# Patient Record
Sex: FEMALE | Race: WHITE | Employment: OTHER | URBAN - METROPOLITAN AREA
[De-identification: names, ages, dates, MRNs, and addresses within clinical notes are randomized per-mention and may not be internally consistent; named-entity substitution may affect disease eponyms.]

---

## 2017-04-03 ENCOUNTER — OFFICE VISIT (OUTPATIENT)
Dept: FAMILY MEDICINE CLINIC | Age: 47
End: 2017-04-03
Payer: MEDICARE

## 2017-04-03 VITALS
SYSTOLIC BLOOD PRESSURE: 128 MMHG | DIASTOLIC BLOOD PRESSURE: 89 MMHG | WEIGHT: 197.2 LBS | HEART RATE: 83 BPM | BODY MASS INDEX: 34.94 KG/M2 | HEIGHT: 63 IN

## 2017-04-03 DIAGNOSIS — E78.2 MIXED HYPERLIPIDEMIA: ICD-10-CM

## 2017-04-03 DIAGNOSIS — F32.A DEPRESSION, UNSPECIFIED DEPRESSION TYPE: ICD-10-CM

## 2017-04-03 DIAGNOSIS — D50.9 IRON DEFICIENCY ANEMIA, UNSPECIFIED IRON DEFICIENCY ANEMIA TYPE: ICD-10-CM

## 2017-04-03 PROCEDURE — 1036F TOBACCO NON-USER: CPT | Performed by: NURSE PRACTITIONER

## 2017-04-03 PROCEDURE — G8427 DOCREV CUR MEDS BY ELIG CLIN: HCPCS | Performed by: NURSE PRACTITIONER

## 2017-04-03 PROCEDURE — G8417 CALC BMI ABV UP PARAM F/U: HCPCS | Performed by: NURSE PRACTITIONER

## 2017-04-03 PROCEDURE — 99214 OFFICE O/P EST MOD 30 MIN: CPT | Performed by: NURSE PRACTITIONER

## 2017-04-03 RX ORDER — DULOXETINE 40 MG/1
40 CAPSULE, DELAYED RELEASE ORAL DAILY
Qty: 30 CAPSULE | Refills: 3 | Status: SHIPPED | OUTPATIENT
Start: 2017-04-03 | End: 2017-09-04 | Stop reason: SDUPTHER

## 2017-04-03 ASSESSMENT — ENCOUNTER SYMPTOMS
COUGH: 0
SHORTNESS OF BREATH: 0
NAUSEA: 0
ABDOMINAL PAIN: 0

## 2017-06-20 ENCOUNTER — HOSPITAL ENCOUNTER (OUTPATIENT)
Age: 47
Setting detail: SPECIMEN
Discharge: HOME | End: 2017-06-20
Payer: MEDICARE

## 2017-06-20 LAB — IGE: 79 IU/ML

## 2017-09-04 DIAGNOSIS — F32.A DEPRESSION, UNSPECIFIED DEPRESSION TYPE: ICD-10-CM

## 2017-09-05 RX ORDER — DULOXETINE 40 MG/1
CAPSULE, DELAYED RELEASE ORAL
Qty: 30 CAPSULE | Refills: 3 | Status: SHIPPED | OUTPATIENT
Start: 2017-09-05 | End: 2018-02-20 | Stop reason: SDUPTHER

## 2017-12-25 ENCOUNTER — HOSPITAL ENCOUNTER (EMERGENCY)
Age: 47
Discharge: HOME | End: 2017-12-25
Attending: EMERGENCY MEDICINE
Payer: MEDICARE

## 2017-12-25 VITALS
HEIGHT: 63 IN | RESPIRATION RATE: 16 BRPM | BODY MASS INDEX: 28.35 KG/M2 | SYSTOLIC BLOOD PRESSURE: 131 MMHG | OXYGEN SATURATION: 96 % | WEIGHT: 160 LBS | TEMPERATURE: 97.9 F | HEART RATE: 81 BPM | DIASTOLIC BLOOD PRESSURE: 79 MMHG

## 2017-12-25 DIAGNOSIS — R11.2 NON-INTRACTABLE VOMITING WITH NAUSEA, UNSPECIFIED VOMITING TYPE: Primary | ICD-10-CM

## 2017-12-25 DIAGNOSIS — B34.9 VIRAL SYNDROME: ICD-10-CM

## 2017-12-25 DIAGNOSIS — R19.7 DIARRHEA, UNSPECIFIED TYPE: ICD-10-CM

## 2017-12-25 LAB
-: ABNORMAL
ABSOLUTE BANDS #: 0.94 K/UL (ref 0–1)
ABSOLUTE EOS #: 0 K/UL (ref 0–0.4)
ABSOLUTE IMMATURE GRANULOCYTE: ABNORMAL K/UL (ref 0–0.3)
ABSOLUTE LYMPH #: 1.4 K/UL (ref 1–4.8)
ABSOLUTE MONO #: 0 K/UL (ref 0.1–1.3)
ALBUMIN SERPL-MCNC: 4.2 G/DL (ref 3.5–5.2)
ALBUMIN/GLOBULIN RATIO: ABNORMAL (ref 1–2.5)
ALP BLD-CCNC: 99 U/L (ref 35–104)
ALT SERPL-CCNC: 17 U/L (ref 5–33)
AMORPHOUS: ABNORMAL
ANION GAP SERPL CALCULATED.3IONS-SCNC: 14 MMOL/L (ref 9–17)
AST SERPL-CCNC: 18 U/L
BACTERIA: ABNORMAL
BANDS: 6 % (ref 0–10)
BASOPHILS # BLD: 1 % (ref 0–2)
BASOPHILS ABSOLUTE: 0.16 K/UL (ref 0–0.2)
BILIRUB SERPL-MCNC: 0.35 MG/DL (ref 0.3–1.2)
BILIRUBIN URINE: NEGATIVE
BUN BLDV-MCNC: 9 MG/DL (ref 6–20)
BUN/CREAT BLD: ABNORMAL (ref 9–20)
CALCIUM SERPL-MCNC: 9.5 MG/DL (ref 8.6–10.4)
CASTS UA: ABNORMAL /LPF
CHLORIDE BLD-SCNC: 101 MMOL/L (ref 98–107)
CO2: 24 MMOL/L (ref 20–31)
COLOR: YELLOW
COMMENT UA: ABNORMAL
CREAT SERPL-MCNC: 0.52 MG/DL (ref 0.5–0.9)
CRYSTALS, UA: ABNORMAL /HPF
DIFFERENTIAL TYPE: ABNORMAL
EOSINOPHILS RELATIVE PERCENT: 0 % (ref 0–4)
EPITHELIAL CELLS UA: ABNORMAL /HPF
GFR AFRICAN AMERICAN: >60 ML/MIN
GFR NON-AFRICAN AMERICAN: >60 ML/MIN
GFR SERPL CREATININE-BSD FRML MDRD: ABNORMAL ML/MIN/{1.73_M2}
GFR SERPL CREATININE-BSD FRML MDRD: ABNORMAL ML/MIN/{1.73_M2}
GLUCOSE BLD-MCNC: 114 MG/DL (ref 70–99)
GLUCOSE URINE: NEGATIVE
HCT VFR BLD CALC: 41.8 % (ref 36–46)
HEMOGLOBIN: 14.2 G/DL (ref 12–16)
IMMATURE GRANULOCYTES: ABNORMAL %
KETONES, URINE: ABNORMAL
LEUKOCYTE ESTERASE, URINE: ABNORMAL
LIPASE: 27 U/L (ref 13–60)
LYMPHOCYTES # BLD: 9 % (ref 24–44)
MAGNESIUM: 2 MG/DL (ref 1.6–2.6)
MCH RBC QN AUTO: 29.3 PG (ref 26–34)
MCHC RBC AUTO-ENTMCNC: 34.1 G/DL (ref 31–37)
MCV RBC AUTO: 85.8 FL (ref 80–100)
MONOCYTES # BLD: 0 % (ref 1–7)
MORPHOLOGY: NORMAL
MUCUS: ABNORMAL
MYELOCYTES ABSOLUTE COUNT: 0.16 K/UL
MYELOCYTES: 1 %
NITRITE, URINE: NEGATIVE
OTHER OBSERVATIONS UA: ABNORMAL
PDW BLD-RTO: 13.8 % (ref 11.5–14.9)
PH UA: 7.5 (ref 5–8)
PLATELET # BLD: 251 K/UL (ref 150–450)
PLATELET ESTIMATE: ABNORMAL
PMV BLD AUTO: 7.9 FL (ref 6–12)
POTASSIUM SERPL-SCNC: 4.2 MMOL/L (ref 3.7–5.3)
PROTEIN UA: NEGATIVE
RBC # BLD: 4.87 M/UL (ref 4–5.2)
RBC # BLD: ABNORMAL 10*6/UL
RBC UA: ABNORMAL /HPF
RENAL EPITHELIAL, UA: ABNORMAL /HPF
SEG NEUTROPHILS: 83 % (ref 36–66)
SEGMENTED NEUTROPHILS ABSOLUTE COUNT: 12.94 K/UL (ref 1.3–9.1)
SODIUM BLD-SCNC: 139 MMOL/L (ref 135–144)
SPECIFIC GRAVITY UA: 1.02 (ref 1–1.03)
TOTAL PROTEIN: 7.4 G/DL (ref 6.4–8.3)
TRICHOMONAS: ABNORMAL
TURBIDITY: CLEAR
URINE HGB: NEGATIVE
UROBILINOGEN, URINE: NORMAL
WBC # BLD: 15.6 K/UL (ref 3.5–11)
WBC # BLD: ABNORMAL 10*3/UL
WBC UA: ABNORMAL /HPF
YEAST: ABNORMAL

## 2017-12-25 PROCEDURE — 83735 ASSAY OF MAGNESIUM: CPT

## 2017-12-25 PROCEDURE — 96374 THER/PROPH/DIAG INJ IV PUSH: CPT

## 2017-12-25 PROCEDURE — 81001 URINALYSIS AUTO W/SCOPE: CPT

## 2017-12-25 PROCEDURE — 80053 COMPREHEN METABOLIC PANEL: CPT

## 2017-12-25 PROCEDURE — 2580000003 HC RX 258: Performed by: EMERGENCY MEDICINE

## 2017-12-25 PROCEDURE — 96361 HYDRATE IV INFUSION ADD-ON: CPT

## 2017-12-25 PROCEDURE — 83690 ASSAY OF LIPASE: CPT

## 2017-12-25 PROCEDURE — 36415 COLL VENOUS BLD VENIPUNCTURE: CPT

## 2017-12-25 PROCEDURE — 99284 EMERGENCY DEPT VISIT MOD MDM: CPT

## 2017-12-25 PROCEDURE — 96375 TX/PRO/DX INJ NEW DRUG ADDON: CPT

## 2017-12-25 PROCEDURE — 6360000002 HC RX W HCPCS: Performed by: EMERGENCY MEDICINE

## 2017-12-25 PROCEDURE — 87086 URINE CULTURE/COLONY COUNT: CPT

## 2017-12-25 PROCEDURE — 85025 COMPLETE CBC W/AUTO DIFF WBC: CPT

## 2017-12-25 RX ORDER — DIPHENHYDRAMINE HYDROCHLORIDE 50 MG/ML
25 INJECTION INTRAMUSCULAR; INTRAVENOUS ONCE
Status: COMPLETED | OUTPATIENT
Start: 2017-12-25 | End: 2017-12-25

## 2017-12-25 RX ORDER — METOCLOPRAMIDE HYDROCHLORIDE 5 MG/ML
10 INJECTION INTRAMUSCULAR; INTRAVENOUS ONCE
Status: COMPLETED | OUTPATIENT
Start: 2017-12-25 | End: 2017-12-25

## 2017-12-25 RX ORDER — KETOROLAC TROMETHAMINE 30 MG/ML
30 INJECTION, SOLUTION INTRAMUSCULAR; INTRAVENOUS ONCE
Status: COMPLETED | OUTPATIENT
Start: 2017-12-25 | End: 2017-12-25

## 2017-12-25 RX ORDER — ONDANSETRON 4 MG/1
4 TABLET, FILM COATED ORAL EVERY 8 HOURS PRN
Qty: 10 TABLET | Refills: 0 | Status: SHIPPED | OUTPATIENT
Start: 2017-12-25 | End: 2017-12-28

## 2017-12-25 RX ORDER — 0.9 % SODIUM CHLORIDE 0.9 %
1000 INTRAVENOUS SOLUTION INTRAVENOUS ONCE
Status: COMPLETED | OUTPATIENT
Start: 2017-12-25 | End: 2017-12-25

## 2017-12-25 RX ORDER — FENTANYL CITRATE 50 UG/ML
100 INJECTION, SOLUTION INTRAMUSCULAR; INTRAVENOUS ONCE
Status: COMPLETED | OUTPATIENT
Start: 2017-12-25 | End: 2017-12-25

## 2017-12-25 RX ADMIN — DIPHENHYDRAMINE HYDROCHLORIDE 25 MG: 50 INJECTION, SOLUTION INTRAMUSCULAR; INTRAVENOUS at 14:20

## 2017-12-25 RX ADMIN — METOCLOPRAMIDE 10 MG: 5 INJECTION, SOLUTION INTRAMUSCULAR; INTRAVENOUS at 14:19

## 2017-12-25 RX ADMIN — SODIUM CHLORIDE 1000 ML: 9 INJECTION, SOLUTION INTRAVENOUS at 14:19

## 2017-12-25 RX ADMIN — KETOROLAC TROMETHAMINE 30 MG: 30 INJECTION, SOLUTION INTRAMUSCULAR at 16:30

## 2017-12-25 RX ADMIN — FENTANYL CITRATE 100 MCG: 50 INJECTION INTRAMUSCULAR; INTRAVENOUS at 15:03

## 2017-12-25 ASSESSMENT — PAIN SCALES - GENERAL
PAINLEVEL_OUTOF10: 3
PAINLEVEL_OUTOF10: 6
PAINLEVEL_OUTOF10: 10
PAINLEVEL_OUTOF10: 5

## 2017-12-25 ASSESSMENT — PAIN DESCRIPTION - DESCRIPTORS
DESCRIPTORS: THROBBING
DESCRIPTORS_2: HEADACHE
DESCRIPTORS: ACHING

## 2017-12-25 ASSESSMENT — PAIN DESCRIPTION - ORIENTATION
ORIENTATION: MID
ORIENTATION: LEFT;RIGHT;UPPER

## 2017-12-25 ASSESSMENT — PAIN DESCRIPTION - LOCATION
LOCATION: ABDOMEN
LOCATION_2: HEAD
LOCATION: HEAD

## 2017-12-25 ASSESSMENT — PAIN DESCRIPTION - INTENSITY: RATING_2: 10

## 2017-12-25 ASSESSMENT — PAIN DESCRIPTION - PAIN TYPE
TYPE: ACUTE PAIN
TYPE: ACUTE PAIN

## 2017-12-25 NOTE — ED PROVIDER NOTES
wound.      Negative in 10 essential Systems except as mentioned above and in the HPI. PAST MEDICAL HISTORY    has a past medical history of Arthritis; Charcot-Shanel-Tooth disease; Depression; History of ; History of miscarriage; History of tubal ligation; Kidney anomaly, congenital; Kidney stones; Muscular dystrophy ([address] Renal disease; and Seasonal allergies. SURGICAL HISTORY      has a past surgical history that includes Inguinal hernia repair; Kidney removal; Cholecystectomy, laparoscopic (16); Tubal ligation (1994); Knee arthroscopy (Left); Knee arthroscopy (Right, 2016); Breast biopsy (Bilateral); Dilation and curettage of uterus; and Endometrial ablation (10/19/2016). CURRENT MEDICATIONS       Previous Medications    DULOXETINE HCL 40 MG CPEP    take 1 capsule by mouth once daily    FLUTICASONE (FLONASE) 50 MCG/ACT NASAL SPRAY    instill 1 spray into each nostril once daily    HYDROCODONE-ACETAMINOPHEN (NORCO) 7.5-325 MG PER TABLET        IBUPROFEN (ADVIL;MOTRIN) 800 MG TABLET    Take 1 tablet by mouth every 6 hours as needed for Pain    LORATADINE (CLARITIN) 10 MG CAPS    Take 10 mg by mouth daily        ALLERGIES     is allergic to penicillins. FAMILY HISTORY     indicated that her mother is alive. She indicated that her father is . She indicated that both of her brothers are alive. She indicated that her maternal grandmother is . She indicated that her maternal grandfather is . She indicated that her paternal grandmother is . She indicated that her paternal grandfather is . family history includes Alcohol Abuse in her father and maternal grandfather; Alzheimer's Disease in her paternal grandmother; COPD in her father; Cirrhosis in her father and maternal grandfather; Diabetes in her brother; High Blood Pressure in her mother; Hypertension in her father;  Other in her mother and paternal grandfather; Seizures in her brother. SOCIAL HISTORY      reports that she has never smoked. She has never used smokeless tobacco. She reports that she does not drink alcohol or use drugs. PHYSICAL EXAM     INITIAL VITALS:  height is 5' 3\" (1.6 m) and weight is 160 lb (72.6 kg). Her oral temperature is 97.9 °F (36.6 °C). Her blood pressure is 131/79 and her pulse is 81. Her respiration is 16 and oxygen saturation is 96%. Physical Exam   Constitutional: She is oriented to person, place, and time and well-developed, well-nourished, and in no distress. No distress. HENT:   Head: Normocephalic and atraumatic. Mouth/Throat: Mucous membranes are dry. Eyes: Conjunctivae are normal. Pupils are equal, round, and reactive to light. Neck: Neck supple. Cardiovascular: Normal rate, regular rhythm, normal heart sounds and intact distal pulses. No murmur heard. Pulmonary/Chest: Effort normal and breath sounds normal. No respiratory distress. Abdominal: Soft. Bowel sounds are normal. She exhibits no distension. There is no tenderness. There is no rebound and no guarding. Musculoskeletal: She exhibits no edema or tenderness. Lymphadenopathy:     She has no cervical adenopathy. Neurological: She is alert and oriented to person, place, and time. GCS score is 15. Skin: Skin is warm and dry. No rash noted. She is not diaphoretic. Psychiatric: Affect and judgment normal.   Nursing note and vitals reviewed. DIFFERENTIAL DIAGNOSIS/MDM:   52-year-old female presents with nausea, vomiting and diarrhea since this morning. She is afebrile and nontoxic in appearance. Vital signs are within normal limits. She does appear mildly dehydrated with dry mucous murmurs. No abdominal tenderness on exam.  No peritoneal signs. We'll get abdominal lab work, give IV fluids, antiemetics and analgesics and reassess.   Suspect gastroenteritis, cholelithiasis versus cholecystitis, pancreatitis, food poisoning, metabolic abnormality, to keep her self hydrated by drinking plenty of fluids. We'll prescribe Zofran for the next 48-72 hours. Patient was strongly advised to return if symptoms worsen. Patient agreeable with plan will be discharged home today. CRITICAL CARE:      CONSULTS:  None      PROCEDURES:      FINAL IMPRESSION      1. Non-intractable vomiting with nausea, unspecified vomiting type    2. Viral syndrome    3.  Diarrhea, unspecified type            DISPOSITION/PLAN   DISPOSITION Decision To Discharge 12/25/2017 05:24:25 PM          PATIENT REFERRED TO:  Jesica Lucas, [address] [place] [address] 75  [address] [facility] [address]  [phone]    Schedule an appointment as soon as possible for a visit in 2 days        DISCHARGE MEDICATIONS:  New Prescriptions    ONDANSETRON (ZOFRAN) 4 MG TABLET    Take 1 tablet by mouth every 8 hours as needed for Nausea       (Please note that portions of this note were completed with a voice recognition program.  Efforts were made to edit the dictations but occasionally words are mis-transcribed.)    Anika Diaz DO  Attending Emergency Physician          Anika Diaz DO  12/25/17 1731

## 2017-12-25 NOTE — ED NOTES
Walked to [facility] with steady gait, voided clear yellow with spec to lab     Sophia Garvin, RN  12/25/17 0055

## 2017-12-26 LAB
CULTURE: NORMAL
CULTURE: NORMAL
Lab: NORMAL
SPECIMEN DESCRIPTION: NORMAL
SPECIMEN DESCRIPTION: NORMAL
STATUS: NORMAL

## 2018-01-12 ENCOUNTER — OFFICE VISIT (OUTPATIENT)
Dept: FAMILY MEDICINE CLINIC | Age: 48
End: 2018-01-12
Payer: MEDICARE

## 2018-01-12 ENCOUNTER — HOSPITAL ENCOUNTER (OUTPATIENT)
Age: 48
Discharge: HOME | End: 2018-01-12
Payer: MEDICARE

## 2018-01-12 VITALS
OXYGEN SATURATION: 95 % | DIASTOLIC BLOOD PRESSURE: 90 MMHG | SYSTOLIC BLOOD PRESSURE: 110 MMHG | RESPIRATION RATE: 20 BRPM | HEART RATE: 80 BPM | TEMPERATURE: 98 F

## 2018-01-12 DIAGNOSIS — R63.5 WEIGHT GAIN: ICD-10-CM

## 2018-01-12 DIAGNOSIS — E78.2 MIXED HYPERLIPIDEMIA: ICD-10-CM

## 2018-01-12 DIAGNOSIS — R03.0 ELEVATED BP WITHOUT DIAGNOSIS OF HYPERTENSION: Primary | ICD-10-CM

## 2018-01-12 DIAGNOSIS — R51.9 NONINTRACTABLE HEADACHE, UNSPECIFIED CHRONICITY PATTERN, UNSPECIFIED HEADACHE TYPE: ICD-10-CM

## 2018-01-12 DIAGNOSIS — F33.0 MILD EPISODE OF RECURRENT MAJOR DEPRESSIVE DISORDER (HCC): ICD-10-CM

## 2018-01-12 LAB
CHOLESTEROL/HDL RATIO: 3.5
CHOLESTEROL: 182 MG/DL
DIRECT EXAM: NORMAL
HDLC SERPL-MCNC: 52 MG/DL
LDL CHOLESTEROL: 102 MG/DL (ref 0–130)
Lab: NORMAL
SPECIMEN DESCRIPTION: NORMAL
SPECIMEN DESCRIPTION: NORMAL
STATUS: NORMAL
TRIGL SERPL-MCNC: 138 MG/DL
TSH SERPL DL<=0.05 MIU/L-ACNC: 0.67 MIU/L (ref 0.3–5)
VLDLC SERPL CALC-MCNC: NORMAL MG/DL (ref 1–30)

## 2018-01-12 PROCEDURE — 99214 OFFICE O/P EST MOD 30 MIN: CPT | Performed by: NURSE PRACTITIONER

## 2018-01-12 PROCEDURE — G8419 CALC BMI OUT NRM PARAM NOF/U: HCPCS | Performed by: NURSE PRACTITIONER

## 2018-01-12 PROCEDURE — G8484 FLU IMMUNIZE NO ADMIN: HCPCS | Performed by: NURSE PRACTITIONER

## 2018-01-12 PROCEDURE — 36415 COLL VENOUS BLD VENIPUNCTURE: CPT

## 2018-01-12 PROCEDURE — 1036F TOBACCO NON-USER: CPT | Performed by: NURSE PRACTITIONER

## 2018-01-12 PROCEDURE — G8427 DOCREV CUR MEDS BY ELIG CLIN: HCPCS | Performed by: NURSE PRACTITIONER

## 2018-01-12 PROCEDURE — 87804 INFLUENZA ASSAY W/OPTIC: CPT

## 2018-01-12 PROCEDURE — 84443 ASSAY THYROID STIM HORMONE: CPT

## 2018-01-12 PROCEDURE — 80061 LIPID PANEL: CPT

## 2018-01-12 RX ORDER — MONTELUKAST SODIUM 10 MG/1
10 TABLET ORAL DAILY
COMMUNITY
End: 2018-03-15 | Stop reason: ALTCHOICE

## 2018-01-12 ASSESSMENT — ENCOUNTER SYMPTOMS
VOMITING: 0
ABDOMINAL PAIN: 0
NAUSEA: 0
SHORTNESS OF BREATH: 0
WHEEZING: 0

## 2018-01-12 NOTE — PROGRESS NOTES
Visit Information    Have you changed or started any medications since your last visit including any over-the-counter medicines, vitamins, or herbal medicines? no   Have you stopped taking any of your medications? Is so, why? -  no  Are you having any side effects from any of your medications? - no    Have you seen any other physician or provider since your last visit? yes -  Er 12/25/2017   Have you had any other diagnostic tests since your last visit? yes -  Blood  And urine   Have you been seen in the emergency room and/or had an admission in a hospital since we last saw you?  yes -  12/25/2017   Have you had your routine dental cleaning in the past 6 months?  no     Do you have an active MyChart account? If no, what is the barrier?   Yes    Patient Care Team:  Genevieve Burdick CNP as PCP - General (Certified Nurse Practitioner)  Genevieve Burdick CNP as PCP - S Attributed Provider  Teresita Rosas MD as Surgeon (Orthopedic Surgery)  Fernando Negrete DO as Consulting Physician (Obstetrics & Gynecology)  Gi Abrams as Consulting Physician (Allergy)    Medical History Review  Past Medical, Family, and Social History reviewed and does not contribute to the patient presenting condition    Health Maintenance   Topic Date Due    DTaP/Tdap/Td vaccine (1 - Tdap) 05/12/1990    Cervical cancer screen  06/21/2017    Flu vaccine (1) 09/01/2017    Diabetes screen  07/08/2019    Lipid screen  12/16/2021    HIV screen  Completed
counseling on the following healthy behaviors: nutrition, exercise and weight reduction  Reviewed prior labs and health maintenance. Continue current medications, diet and exercise. Discussed use, benefit, and side effects of prescribed medications. Barriers to medication compliance addressed. Patient given educational materials - see patient instructions. All patient questions answered. Patient voiced understanding.

## 2018-01-16 ENCOUNTER — OFFICE VISIT (OUTPATIENT)
Dept: BEHAVIORAL/MENTAL HEALTH CLINIC | Age: 48
End: 2018-01-16
Payer: MEDICARE

## 2018-01-16 DIAGNOSIS — F33.1 MAJOR DEPRESSIVE DISORDER, RECURRENT EPISODE, MODERATE DEGREE (HCC): Primary | ICD-10-CM

## 2018-01-16 PROCEDURE — 90837 PSYTX W PT 60 MINUTES: CPT | Performed by: SOCIAL WORKER

## 2018-01-16 ASSESSMENT — PATIENT HEALTH QUESTIONNAIRE - PHQ9
10. IF YOU CHECKED OFF ANY PROBLEMS, HOW DIFFICULT HAVE THESE PROBLEMS MADE IT FOR YOU TO DO YOUR WORK, TAKE CARE OF THINGS AT HOME, OR GET ALONG WITH OTHER PEOPLE: 2
5. POOR APPETITE OR OVEREATING: 2
SUM OF ALL RESPONSES TO PHQ QUESTIONS 1-9: 18
SUM OF ALL RESPONSES TO PHQ9 QUESTIONS 1 & 2: 6
1. LITTLE INTEREST OR PLEASURE IN DOING THINGS: 3
7. TROUBLE CONCENTRATING ON THINGS, SUCH AS READING THE NEWSPAPER OR WATCHING TELEVISION: 3
9. THOUGHTS THAT YOU WOULD BE BETTER OFF DEAD, OR OF HURTING YOURSELF: 0
2. FEELING DOWN, DEPRESSED OR HOPELESS: 3
3. TROUBLE FALLING OR STAYING ASLEEP: 2
8. MOVING OR SPEAKING SO SLOWLY THAT OTHER PEOPLE COULD HAVE NOTICED. OR THE OPPOSITE, BEING SO FIGETY OR RESTLESS THAT YOU HAVE BEEN MOVING AROUND A LOT MORE THAN USUAL: 0
4. FEELING TIRED OR HAVING LITTLE ENERGY: 2
6. FEELING BAD ABOUT YOURSELF - OR THAT YOU ARE A FAILURE OR HAVE LET YOURSELF OR YOUR FAMILY DOWN: 3
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
## 2018-02-06 ENCOUNTER — INITIAL CONSULT (OUTPATIENT)
Dept: BEHAVIORAL/MENTAL HEALTH CLINIC | Age: 48
End: 2018-02-06
Payer: MEDICARE

## 2018-02-06 DIAGNOSIS — F33.1 MAJOR DEPRESSIVE DISORDER, RECURRENT EPISODE, MODERATE DEGREE (HCC): Primary | ICD-10-CM

## 2018-02-06 PROCEDURE — 90832 PSYTX W PT 30 MINUTES: CPT | Performed by: SOCIAL WORKER
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
## 2018-02-20 DIAGNOSIS — F32.A DEPRESSION, UNSPECIFIED DEPRESSION TYPE: ICD-10-CM

## 2018-02-20 RX ORDER — DULOXETINE 40 MG/1
CAPSULE, DELAYED RELEASE ORAL
Qty: 30 CAPSULE | Refills: 3 | Status: SHIPPED | OUTPATIENT
Start: 2018-02-20 | End: 2018-03-06

## 2018-03-06 ENCOUNTER — OFFICE VISIT (OUTPATIENT)
Dept: FAMILY MEDICINE CLINIC | Age: 48
End: 2018-03-06
Payer: MEDICARE

## 2018-03-06 ENCOUNTER — TELEPHONE (OUTPATIENT)
Dept: FAMILY MEDICINE CLINIC | Age: 48
End: 2018-03-06

## 2018-03-06 VITALS
OXYGEN SATURATION: 98 % | BODY MASS INDEX: 35.3 KG/M2 | SYSTOLIC BLOOD PRESSURE: 133 MMHG | HEIGHT: 63 IN | WEIGHT: 199.2 LBS | HEART RATE: 101 BPM | DIASTOLIC BLOOD PRESSURE: 88 MMHG | TEMPERATURE: 98.2 F

## 2018-03-06 DIAGNOSIS — D72.829 LEUKOCYTOSIS, UNSPECIFIED TYPE: ICD-10-CM

## 2018-03-06 DIAGNOSIS — F41.1 GAD (GENERALIZED ANXIETY DISORDER): ICD-10-CM

## 2018-03-06 DIAGNOSIS — M21.371 BILATERAL FOOT-DROP: ICD-10-CM

## 2018-03-06 DIAGNOSIS — R20.2 NUMBNESS AND TINGLING: ICD-10-CM

## 2018-03-06 DIAGNOSIS — R73.9 HYPERGLYCEMIA: ICD-10-CM

## 2018-03-06 DIAGNOSIS — R20.0 NUMBNESS AND TINGLING: ICD-10-CM

## 2018-03-06 DIAGNOSIS — F33.1 MODERATE EPISODE OF RECURRENT MAJOR DEPRESSIVE DISORDER (HCC): ICD-10-CM

## 2018-03-06 DIAGNOSIS — R29.6 FALLS FREQUENTLY: ICD-10-CM

## 2018-03-06 DIAGNOSIS — M21.372 BILATERAL FOOT-DROP: ICD-10-CM

## 2018-03-06 DIAGNOSIS — M51.24 THORACIC DISC HERNIATION: ICD-10-CM

## 2018-03-06 DIAGNOSIS — M48.04 SPINAL STENOSIS, THORACIC: ICD-10-CM

## 2018-03-06 DIAGNOSIS — M89.9 BONE DISORDER: ICD-10-CM

## 2018-03-06 DIAGNOSIS — G89.29 CHRONIC BACK PAIN GREATER THAN 3 MONTHS DURATION: ICD-10-CM

## 2018-03-06 DIAGNOSIS — G60.0 CHARCOT-MARIE-TOOTH DISEASE: Primary | Chronic | ICD-10-CM

## 2018-03-06 DIAGNOSIS — M54.9 CHRONIC BACK PAIN GREATER THAN 3 MONTHS DURATION: ICD-10-CM

## 2018-03-06 DIAGNOSIS — Z23 NEED FOR DIPHTHERIA-TETANUS-PERTUSSIS (TDAP) VACCINE: ICD-10-CM

## 2018-03-06 DIAGNOSIS — R00.0 TACHYCARDIA: ICD-10-CM

## 2018-03-06 PROBLEM — G35 MULTIPLE SCLEROSIS (HCC): Status: RESOLVED | Noted: 2018-03-06 | Resolved: 2018-03-06

## 2018-03-06 PROBLEM — E66.9 OBESITY: Status: ACTIVE | Noted: 2018-03-06

## 2018-03-06 PROBLEM — R26.9 GAIT ABNORMALITY: Status: ACTIVE | Noted: 2017-06-21

## 2018-03-06 PROBLEM — G35 MULTIPLE SCLEROSIS (HCC): Status: ACTIVE | Noted: 2018-03-06

## 2018-03-06 PROCEDURE — G8484 FLU IMMUNIZE NO ADMIN: HCPCS | Performed by: FAMILY MEDICINE

## 2018-03-06 PROCEDURE — 1036F TOBACCO NON-USER: CPT | Performed by: FAMILY MEDICINE

## 2018-03-06 PROCEDURE — G8417 CALC BMI ABV UP PARAM F/U: HCPCS | Performed by: FAMILY MEDICINE

## 2018-03-06 PROCEDURE — G8427 DOCREV CUR MEDS BY ELIG CLIN: HCPCS | Performed by: FAMILY MEDICINE

## 2018-03-06 PROCEDURE — 99215 OFFICE O/P EST HI 40 MIN: CPT | Performed by: FAMILY MEDICINE

## 2018-03-06 PROCEDURE — G0444 DEPRESSION SCREEN ANNUAL: HCPCS | Performed by: FAMILY MEDICINE

## 2018-03-06 RX ORDER — FLUOXETINE HYDROCHLORIDE 20 MG/1
20 CAPSULE ORAL DAILY
Qty: 30 CAPSULE | Refills: 3 | Status: SHIPPED | OUTPATIENT
Start: 2018-03-06 | End: 2018-03-09 | Stop reason: SDUPTHER

## 2018-03-06 RX ORDER — GABAPENTIN 100 MG/1
100 CAPSULE ORAL EVERY EVENING
Qty: 30 CAPSULE | Refills: 0 | Status: SHIPPED | OUTPATIENT
Start: 2018-03-06 | End: 2019-03-19

## 2018-03-06 RX ORDER — IBUPROFEN 200 MG
800 TABLET ORAL
COMMUNITY
Start: 2016-10-19 | End: 2020-01-02 | Stop reason: ALTCHOICE

## 2018-03-06 RX ORDER — B-COMPLEX WITH VITAMIN C
1 TABLET ORAL DAILY
Qty: 90 TABLET | Refills: 3 | Status: SHIPPED | OUTPATIENT
Start: 2018-03-06 | End: 2019-03-19

## 2018-03-06 ASSESSMENT — PATIENT HEALTH QUESTIONNAIRE - PHQ9
2. FEELING DOWN, DEPRESSED OR HOPELESS: 1
3. TROUBLE FALLING OR STAYING ASLEEP: 1
1. LITTLE INTEREST OR PLEASURE IN DOING THINGS: 2
4. FEELING TIRED OR HAVING LITTLE ENERGY: 3
7. TROUBLE CONCENTRATING ON THINGS, SUCH AS READING THE NEWSPAPER OR WATCHING TELEVISION: 1
10. IF YOU CHECKED OFF ANY PROBLEMS, HOW DIFFICULT HAVE THESE PROBLEMS MADE IT FOR YOU TO DO YOUR WORK, TAKE CARE OF THINGS AT HOME, OR GET ALONG WITH OTHER PEOPLE: 1
SUM OF ALL RESPONSES TO PHQ QUESTIONS 1-9: 11
SUM OF ALL RESPONSES TO PHQ9 QUESTIONS 1 & 2: 3
9. THOUGHTS THAT YOU WOULD BE BETTER OFF DEAD, OR OF HURTING YOURSELF: 0
8. MOVING OR SPEAKING SO SLOWLY THAT OTHER PEOPLE COULD HAVE NOTICED. OR THE OPPOSITE, BEING SO FIGETY OR RESTLESS THAT YOU HAVE BEEN MOVING AROUND A LOT MORE THAN USUAL: 0
6. FEELING BAD ABOUT YOURSELF - OR THAT YOU ARE A FAILURE OR HAVE LET YOURSELF OR YOUR FAMILY DOWN: 1
5. POOR APPETITE OR OVEREATING: 2

## 2018-03-06 ASSESSMENT — ENCOUNTER SYMPTOMS
ABDOMINAL DISTENTION: 0
ABDOMINAL PAIN: 0
NAUSEA: 0
CHEST TIGHTNESS: 0
WHEEZING: 0
BACK PAIN: 1
VOMITING: 0
COUGH: 0
CONSTIPATION: 0
SHORTNESS OF BREATH: 0
DIARRHEA: 0

## 2018-03-06 ASSESSMENT — ANXIETY QUESTIONNAIRES
4. TROUBLE RELAXING: 2-OVER HALF THE DAYS
GAD7 TOTAL SCORE: 16
2. NOT BEING ABLE TO STOP OR CONTROL WORRYING: 3-NEARLY EVERY DAY
6. BECOMING EASILY ANNOYED OR IRRITABLE: 3-NEARLY EVERY DAY
3. WORRYING TOO MUCH ABOUT DIFFERENT THINGS: 3-NEARLY EVERY DAY
1. FEELING NERVOUS, ANXIOUS, OR ON EDGE: 3-NEARLY EVERY DAY
5. BEING SO RESTLESS THAT IT IS HARD TO SIT STILL: 2-OVER HALF THE DAYS
7. FEELING AFRAID AS IF SOMETHING AWFUL MIGHT HAPPEN: 0-NOT AT ALL SURE

## 2018-03-06 NOTE — PROGRESS NOTES
01/12/2018 0.67  0.30 - 5.00 mIU/L Final    Comment: Performed at [facility]: MADYSON GLORIA [address]   [phone]      Specimen Description 01/12/2018 . NASOPHARYNGEAL SWAB Performed at [facility]: MADYSON [address]   Final    Specimen Description 01/12/2018  [address] [phone]   Final    Special Requests 01/12/2018 NOT REPORTED   Final    Direct Exam 01/12/2018 PRESUMPTIVE NEGATIVE for Influenza A + B antigens. Final    Direct Exam 01/12/2018 PCR testing to confirm this result is available upon request.  Specimen will be   Final    Direct Exam 01/12/2018  saved in the laboratory for 7 days. Please call [phone] if PCR testing is   Final    Direct Exam 01/12/2018  indicated.    Final    Status 01/12/2018 FINAL 01/12/2018   Final           Lab Results   Component Value Date    WBC 15.6 (H) 12/25/2017    HGB 14.2 12/25/2017    HCT 41.8 12/25/2017    MCV 85.8 12/25/2017     12/25/2017       Lab Results   Component Value Date     12/25/2017    K 4.2 12/25/2017     12/25/2017    CO2 24 12/25/2017    BUN 9 12/25/2017    CREATININE 0.52 12/25/2017    GLUCOSE 114 12/25/2017    CALCIUM 9.5 12/25/2017          Lab Results   Component Value Date    LABA1C 4.6 07/08/2016     Lab Results   Component Value Date    EAG 85 07/08/2016       Lab Results   Component Value Date    ALT 17 12/25/2017    AST 18 12/25/2017    ALKPHOS 99 12/25/2017    BILITOT 0.35 12/25/2017       Lab Results   Component Value Date    TSH 0.67 01/12/2018       Lab Results   Component Value Date    CHOL 182 01/12/2018    CHOL 202 (H) 12/16/2016     Lab Results   Component Value Date    TRIG 138 01/12/2018    TRIG 162 (H) 12/16/2016     Lab Results   Component Value Date    HDL 52 01/12/2018    HDL 60 12/16/2016     Lab Results   Component Value Date    LDLCHOLESTEROL 102 01/12/2018    LDLCHOLESTEROL 110 12/16/2016       Lab Results   Component Value Date tenderness. She exhibits no edema. Lumbar back: She exhibits decreased range of motion, tenderness, bony tenderness (lumbar and SIJ b/l), pain and spasm. Wearing leg braces. Hesitation when starting to first walk. There is no thoracic pain   Neurological: She is alert and oriented to person, place, and time. She displays abnormal reflex. No cranial nerve deficit. She exhibits abnormal muscle tone. Skin: Skin is warm and dry. No rash noted. She is not diaphoretic. Psychiatric: Her behavior is normal. Judgment and thought content normal. Her mood appears anxious. Her affect is labile. She exhibits a depressed mood. Crying     Nursing note and vitals reviewed. ASSESSMENT AND PLAN      1. Charcot-Shanel-Tooth disease  worsening  - Obdulia Chan MD, Physical Medicine and [address]*  - start gabapentin (NEURONTIN) 100 MG capsule; Take 1 capsule by mouth every evening for 30 days. Dispense: 30 capsule; Refill: 0  -start B Complex Vitamins (VITAMIN B COMPLEX) TABS; Take 1 tablet by mouth daily  Dispense: 90 tablet; Refill: 3  - DME Order for Cane as OP      Maye Conte requires a  Quad cane due to impaired ambulation and for increased stability in order to participate in or complete daily living tasks of: ambulating. The patient is able to safely use the cane and the functional mobility deficit can be sufficiently resolved. 2. Moderate episode of recurrent major depressive disorder (HCC)  improvedr   -Start FLUoxetine (PROZAC) 20 MG capsule; Take 1 capsule by mouth daily  Dispense: 30 capsule; Refill: 3  Stop Cymbalta  Start cognitive behavior therapy    3. Chronic back pain greater than 3 months duration  - [facility]  -start  gabapentin (NEURONTIN) 100 MG capsule; Take 1 capsule by mouth every evening for 30 days. Dispense: 30 capsule; Refill: 0  - Vitamin D 25 Hydroxy;  Future  - DME Order for [facility] as OP    If needed, we can order x-ray lumbar Orders Placed This Encounter   Procedures    CBC Auto Differential     Standing Status:   Future     Standing Expiration Date:   3/6/2019    Hemoglobin A1C     Standing Status:   Future     Standing Expiration Date:   3/6/2019    Vitamin B12 & Folate     Standing Status:   Future     Standing Expiration Date:   3/7/2019    Vitamin D 25 Hydroxy     Standing Status:   Future     Standing Expiration Date:   3/6/2019   Priscilla Zavaleta MD, Physical Medicine and [facility]*     Referral Priority:   Routine     Referral Type:   Consult for Advice and Opinion     Referral Reason:   Specialty Services Required     Referred to Provider:   Yaakov Mcneil MD     Requested Specialty:   Physical Medicine and Rehab     Number of Visits Requested:   [place] [address]     Referral Priority:   Routine     Referral Type:   Consult for Advice and Opinion     Referral Reason:   Specialty Services Required     Referred to Provider:   Hermelinda Barber MD     Number of Visits Requested:   1    EKG 12 Lead     Standing Status:   Future     Standing Expiration Date:   3/6/2019     Order Specific Question:   Reason for Exam?     Answer: Tachycardia    DME Order for Aileen Benders as OP     You must complete the order parameters below and add the medical necessity documentation for this DME in a separate note.      [facility]    Diagnosis:   Frequent falls  Bilateral foot-drop  Charcot-Shanel-Tooth disease  Falls frequently  Thoracic disc herniation  Spinal stenosis, thoracic  Need for diphtheria-tetanus-pertussis (Tdap) vaccine  Chronic back pain greater than 3 months duration    Duration: Purchase         Medications Discontinued During This Encounter   Medication Reason    DULoxetine HCl 40 MG CPEP Cost of medication       Sushma received counseling on the following healthy behaviors: nutrition, exercise, medication adherence and weight loss  Reviewed prior labs and health

## 2018-03-07 ENCOUNTER — TELEPHONE (OUTPATIENT)
Dept: FAMILY MEDICINE CLINIC | Age: 48
End: 2018-03-07

## 2018-03-09 ENCOUNTER — TELEPHONE (OUTPATIENT)
Dept: FAMILY MEDICINE CLINIC | Age: 48
End: 2018-03-09

## 2018-03-09 DIAGNOSIS — F33.1 MODERATE EPISODE OF RECURRENT MAJOR DEPRESSIVE DISORDER (HCC): ICD-10-CM

## 2018-03-09 DIAGNOSIS — F41.1 GAD (GENERALIZED ANXIETY DISORDER): ICD-10-CM

## 2018-03-09 RX ORDER — FLUOXETINE HYDROCHLORIDE 20 MG/1
40 CAPSULE ORAL DAILY
Qty: 30 CAPSULE | Refills: 0
Start: 2018-03-09 | End: 2018-03-15 | Stop reason: DRUGHIGH

## 2018-03-09 NOTE — TELEPHONE ENCOUNTER
Can take prozac 2x 20 mg, call us back in 1 week if it's working and wants it refilled or switch back to Cymbalta.   Also patient needs appointment with Monisha Rosario    Future Appointments  Date Time Provider Dom Trejo   3/15/2018 10:00 AM MD MARIELLE HernandezCZ PAINM[facility] None   4/17/2018 9:15 AM [facility]         Orders Placed This Encounter   Medications    FLUoxetine (PROZAC) 20 MG capsule     Sig: Take 2 capsules by mouth daily Dose increased 3/9/2018     Dispense:  30 capsule     Refill:  0

## 2018-03-09 NOTE — TELEPHONE ENCOUNTER
Future Appointments  Date Time Provider Dom Trejo   4/17/2018 9:15 AM Rosamaria Jimenez MD fp sc [facility]

## 2018-03-14 ENCOUNTER — TELEPHONE (OUTPATIENT)
Dept: FAMILY MEDICINE CLINIC | Age: 48
End: 2018-03-14

## 2018-03-14 ENCOUNTER — OFFICE VISIT (OUTPATIENT)
Dept: BEHAVIORAL/MENTAL HEALTH CLINIC | Age: 48
End: 2018-03-14
Payer: MEDICARE

## 2018-03-14 DIAGNOSIS — F33.1 MODERATE EPISODE OF RECURRENT MAJOR DEPRESSIVE DISORDER (HCC): Primary | ICD-10-CM

## 2018-03-14 DIAGNOSIS — F33.1 MAJOR DEPRESSIVE DISORDER, RECURRENT EPISODE, MODERATE DEGREE (HCC): Primary | ICD-10-CM

## 2018-03-14 PROCEDURE — 90832 PSYTX W PT 30 MINUTES: CPT | Performed by: SOCIAL WORKER

## 2018-03-14 NOTE — PATIENT INSTRUCTIONS
- Start working on limits and boundaries  - Give daughter a schedule blocking off when you are not available

## 2018-03-15 ENCOUNTER — HOSPITAL ENCOUNTER (OUTPATIENT)
Dept: PAIN MANAGEMENT | Age: 48
Discharge: HOME | End: 2018-03-15
Payer: MEDICARE

## 2018-03-15 VITALS
HEIGHT: 63 IN | WEIGHT: 199 LBS | OXYGEN SATURATION: 98 % | DIASTOLIC BLOOD PRESSURE: 98 MMHG | RESPIRATION RATE: 20 BRPM | SYSTOLIC BLOOD PRESSURE: 147 MMHG | TEMPERATURE: 98.6 F | HEART RATE: 97 BPM | BODY MASS INDEX: 35.26 KG/M2

## 2018-03-15 DIAGNOSIS — M48.04 SPINAL STENOSIS, THORACIC: ICD-10-CM

## 2018-03-15 DIAGNOSIS — G60.0 CHARCOT MARIE TOOTH MUSCULAR ATROPHY: Primary | ICD-10-CM

## 2018-03-15 DIAGNOSIS — G89.29 CHRONIC BACK PAIN GREATER THAN 3 MONTHS DURATION: ICD-10-CM

## 2018-03-15 DIAGNOSIS — M21.372 BILATERAL FOOT-DROP: ICD-10-CM

## 2018-03-15 DIAGNOSIS — M51.36 DDD (DEGENERATIVE DISC DISEASE), LUMBAR: ICD-10-CM

## 2018-03-15 DIAGNOSIS — M21.371 BILATERAL FOOT-DROP: ICD-10-CM

## 2018-03-15 DIAGNOSIS — M51.24 THORACIC DISC HERNIATION: ICD-10-CM

## 2018-03-15 DIAGNOSIS — R20.2 NUMBNESS AND TINGLING: ICD-10-CM

## 2018-03-15 DIAGNOSIS — R20.0 NUMBNESS AND TINGLING: ICD-10-CM

## 2018-03-15 DIAGNOSIS — F41.1 GAD (GENERALIZED ANXIETY DISORDER): ICD-10-CM

## 2018-03-15 DIAGNOSIS — M54.9 CHRONIC BACK PAIN GREATER THAN 3 MONTHS DURATION: ICD-10-CM

## 2018-03-15 PROCEDURE — 80307 DRUG TEST PRSMV CHEM ANLYZR: CPT

## 2018-03-15 PROCEDURE — 99204 OFFICE O/P NEW MOD 45 MIN: CPT | Performed by: PAIN MEDICINE

## 2018-03-15 PROCEDURE — 99203 OFFICE O/P NEW LOW 30 MIN: CPT

## 2018-03-15 RX ORDER — FLUOXETINE HYDROCHLORIDE 20 MG/1
60 CAPSULE ORAL DAILY
Qty: 90 CAPSULE | Refills: 0 | Status: SHIPPED | OUTPATIENT
Start: 2018-03-15 | End: 2019-03-19

## 2018-03-15 ASSESSMENT — PAIN DESCRIPTION - ORIENTATION: ORIENTATION: RIGHT;LEFT

## 2018-03-15 ASSESSMENT — ENCOUNTER SYMPTOMS
BLURRED VISION: 0
ORTHOPNEA: 0
NAUSEA: 0
EYES NEGATIVE: 1
VOMITING: 0
PHOTOPHOBIA: 0
BACK PAIN: 1
COUGH: 0
GASTROINTESTINAL NEGATIVE: 1
HEARTBURN: 0
SHORTNESS OF BREATH: 0
CONSTIPATION: 0

## 2018-03-15 ASSESSMENT — PAIN DESCRIPTION - LOCATION: LOCATION: BACK;LEG;KNEE

## 2018-03-15 ASSESSMENT — PAIN DESCRIPTION - FREQUENCY: FREQUENCY: CONTINUOUS

## 2018-03-15 ASSESSMENT — PAIN DESCRIPTION - DIRECTION: RADIATING_TOWARDS: BILATERAL LEGS

## 2018-03-15 ASSESSMENT — PAIN DESCRIPTION - ONSET: ONSET: ON-GOING

## 2018-03-15 ASSESSMENT — PAIN DESCRIPTION - PROGRESSION: CLINICAL_PROGRESSION: GRADUALLY WORSENING

## 2018-03-15 ASSESSMENT — PAIN DESCRIPTION - PAIN TYPE: TYPE: ACUTE PAIN;CHRONIC PAIN

## 2018-03-15 ASSESSMENT — PAIN DESCRIPTION - DESCRIPTORS: DESCRIPTORS: ACHING;CONSTANT;SHOOTING

## 2018-03-15 NOTE — PROGRESS NOTES
[facility] Pain Management  Patient Pain Assessment  Consultation - No att. providers found    Primary Care Physician: Amalia Tolliver MD    Chief complaint:   Chief Complaint   Patient presents with    Back Pain   . HISTORY OF PRESENT ILLNESS:  Atilio People is 56 y.o. female return to the pain clinic in consultation for low back pain by Violeta Russell MD  Patient is a 18-year-old female who is referred to the pain clinic with a history of pain in the low back area of about one year duration. Patient reports she fell on 3/2/2018 and since then the back pain has gotten worse. Apparently she has not seen a physician after the fall. Patient reports she has history of for Charcot's Julia tooth disease and has a weakness in the legs with the foot drop. She wears a brace on the legs bilaterally for the last 20 years. Patient apparently was seen by Dr. Kristan Novoa and was getting injections as well as medications. Patient reports she was out of town one time in the pan out of her medications and hence used THC cookie. She was found positive for [facility] and was discharged. Then she was seen at [facility] by Dr. Lizzy Mckeon and had injections in September and October which apparently did not help the pain. She reports she got frustrated and stopped going and was skiing taking over-the-counter medications  Patient's pain is mostly in the low back as well as in the knees bilaterally. Patient's pain is decreased a certain extent with hot bath and heat. Patient reports she did not do physical therapy for her back pain in the past.      Back Pain   This is a chronic problem. The current episode started more than 1 year ago. The problem occurs constantly. The problem has been gradually worsening (since the fall on 3-2-18) since onset. The pain is present in the lumbar spine and sacro-iliac. The quality of the pain is described as aching (sharp). Radiates to: post. upper thigh. Pain scale: 8-10. The pain is severe.  The Mariya/ Dr Gregorio Knowles for prescriptions appropriate: not applicable  Lost rx/pills: not applicable  Taking more medication than prescribed:  not applicable  Are you receiving PAIN medications from  other doctors: not applicable  Last Urine/Serum Drug Screen : will collect today. State she dropped a \" dirty\" urine with Dr Love Diallo after eating a Marijuana Brownie. Was Serum/UDS as anticipated?  not applicable  Brought pill bottles in :not applicable   Was Pill count appropriate? :not applicable   Are currently pregnant? No/ endometrial ablation . 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    Kidney stones     Muscular dystrophy ([address]     Renal disease 2016    Seasonal allergies        Surgical History  Past Surgical History:   Procedure Laterality Date    BREAST BIOPSY Bilateral     CHOLECYSTECTOMY, LAPAROSCOPIC  16    Dr Mitchell Del Angel ENDOMETRIAL ABLATION  10/19/2016    hysteroscopy; [facility]    INGUINAL HERNIA REPAIR      right    KIDNEY REMOVAL      1 kidneys removed    KNEE ARTHROSCOPY Left     KNEE ARTHROSCOPY Right 2016    TUBAL LIGATION  1994       Medications  Current Outpatient Prescriptions   Medication Sig Dispense Refill    FLUoxetine (PROZAC) 20 MG capsule Take 3 capsules by mouth daily 90 capsule 0    ibuprofen (ADVIL;MOTRIN) 200 MG tablet Take 800 mg by mouth      gabapentin (NEURONTIN) 100 MG capsule Take 1 capsule by mouth every evening for 30 days.  30 capsule 0    B Complex Vitamins (VITAMIN B COMPLEX) TABS Take 1 tablet by mouth daily 90 tablet 3    Loratadine (CLARITIN) 10 MG CAPS Take 10 mg by mouth daily       fluticasone (FLONASE) 50 MCG/ACT nasal spray instill 1 spray into each nostril once daily 16 g 3     No current facility-administered medications for this encounter. Allergies  Penicillins    Family History  family history includes Alcohol Abuse in her father and maternal grandfather; Alzheimer's Disease in her paternal grandmother; COPD in her father; Cirrhosis in her father and maternal grandfather; Diabetes in her brother; High Blood Pressure in her mother; Hypertension in her father; Other in her mother and paternal grandfather; Seizures in her brother. Social History  Social History     Social History    Marital status:      Spouse name: N/A    Number of children: N/A    Years of education: N/A     Social History Main Topics    Smoking status: Never Smoker    Smokeless tobacco: Never Used    Alcohol use Yes      Comment: social    Drug use: No      Comment: + THC states she had one marijuna cookie    Sexual activity: Yes     Partners: Male     Birth control/ protection: Surgical      Comment: TL     Other Topics Concern    None     Social History Narrative    None      reports that she does not use drugs. REVIEW OF SYSTEMS:  Review of Systems   Constitutional: Positive for malaise/fatigue. Negative for fever. HENT: Negative. Negative for ear discharge and hearing loss. Eyes: Negative. Negative for blurred vision and photophobia. Respiratory: Negative for cough and shortness of breath. Cardiovascular: Negative. Negative for chest pain and orthopnea. Gastrointestinal: Negative. Negative for constipation, heartburn, nausea and vomiting. Genitourinary: Negative. Negative for dysuria and hematuria. One Kidney-occ -Kidney stones   Musculoskeletal: Positive for back pain, falls and joint pain. Muscular Dystorphy  Arthritis in Knees   Skin: Negative. Neurological: Positive for tingling, sensory change, focal weakness and weakness. Endo/Heme/Allergies: Bruises/bleeds easily. Psychiatric/Behavioral: Positive for depression. Negative for substance abuse. The patient is nervous/anxious and has insomnia. GENERAL PHYSICAL EXAM:  Vitals: BP (!) 147/98   Pulse 97   Temp 98.6 °F (37 °C) (Oral)   Resp 20   Ht 5' 3\" (1.6 m)   Wt 199 lb (90.3 kg)   SpO2 98%   BMI 35.25 kg/m² , Body mass index is 35.25 kg/m². Physical Exam   Constitutional: She is oriented to person, place, and time. She appears well-developed and well-nourished. HENT:   Head: Normocephalic and atraumatic. Eyes: Conjunctivae and EOM are normal. Pupils are equal, round, and reactive to light. Neck: Normal range of motion. Neck supple. No tracheal deviation present. No thyromegaly present. Cardiovascular: Normal rate and regular rhythm. Pulmonary/Chest: Effort normal and breath sounds normal. No respiratory distress. Abdominal: Soft. Bowel sounds are normal. She exhibits no distension. Musculoskeletal: She exhibits no edema. Neurological: She is alert and oriented to person, place, and time. She displays no atrophy, no tremor and normal reflexes. No cranial nerve deficit or sensory deficit. She displays a negative Romberg sign. Gait abnormal. Coordination normal.   Skin: Skin is warm and dry. Psychiatric: She has a normal mood and affect. Her speech is normal and behavior is normal. Judgment and thought content normal. Cognition and memory are normal.    Right Ankle Exam     Comments:  Has braces on legs        Right Knee Exam     Muscle Strength     The patient has normal right knee strength. Left Knee Exam     Muscle Strength     The patient has normal left knee strength. Right Hip Exam     Muscle Strength   The patient has normal right hip strength. Left Hip Exam     Muscle Strength   The patient has normal left hip strength.        Back Exam     Tenderness   The patient is experiencing tenderness in the lumbar and Control:    [x]  Spine strengthening exercises are discussed with patient in detail. Patient is counseled on importance of exercise and,core strengthening. Some  specific exercises to strengthen the abdominal muscles and low back muscles Were discussed. Also aquatic (water) physical therapy and benefits were explained to patient. including \" Water supports the body and minimizes the effect of gravity, making it easier for patients to start an exercise program.\"   The following important principles were discussed with patient:  1. Limit Bed Rest--Studies show that people with short-term low-back pain who rest feel more pain and have a harder time with daily tasks than those who stay active. 2. Keep Exercising-patient is advised to stay away from strenuous activities like gardening and avoid whatever motion caused the pain in the first place.   3. Maintain Good Posture-Exercises  to maintain good posture were shown to patient. 4. To do exercises learned in PT regularly. []Information (handout) on exercise was  given to patient. Orders Placed This Encounter   Procedures    DRUG SCREEN, PAIN     Expected medication  Last dose  Quantitate for opioids if negative  Quantitate for any illicit substances     Standing Status:   Standing     Number of Occurrences:   1    PT eval and treat     TBD by Pain Clinic MD     Standing Status:   Future     Standing Expiration Date:   3/15/2019    PT aquatic therapy     TBD by Pain Clinic MD     Standing Status:   Future     Standing Expiration Date:   3/15/2019     Patient had some problems in the pain clinic at Dr. Nuria Smith office. I need to review the charts from the office before I can accept her as a patient. I also did a urine screen on her today. Patient's findings on MRI are minimal compared to her symptoms.  Patient was told that once we get all the information from Dr. Ori Sneed office we will review them and then we will let her know whether we will be able to follow her or not. Decision Making Process : Patient's health history and referral records thoroughly reviewed before focused physical examination and discussion with patient. Over 50% of today's visit is spent on examining the patient and counseling. Level of complexity of date to be reviewed is Moderate. The chart date reviewed include the following: Imaging Reports. Summary of Care. Time spent reviewing with patient the below reports:   Medication safety, Treatment options. Level of diagnosis and management options of this case is multiple: involving the following management options: Interventions as needed, medication management as appropriate, future visits, activity modification, heat/ice as needed, Urine drug screen as required. [x]The patient's questions were answered to the best of my abilities. This note was created using voice recognition software. There may be inaccuracies of transcription  that are inadvertently overlooked prior to the signature. There is any questions about the transcription please contact me.     Electronically signed by Lise Red MD on 3/15/2018 at 11:32 PM

## 2018-03-20 ENCOUNTER — HOSPITAL ENCOUNTER (OUTPATIENT)
Age: 48
Discharge: HOME | End: 2018-03-20
Payer: MEDICARE

## 2018-03-20 DIAGNOSIS — M54.9 CHRONIC BACK PAIN GREATER THAN 3 MONTHS DURATION: ICD-10-CM

## 2018-03-20 DIAGNOSIS — R73.9 HYPERGLYCEMIA: ICD-10-CM

## 2018-03-20 DIAGNOSIS — R20.2 NUMBNESS AND TINGLING: ICD-10-CM

## 2018-03-20 DIAGNOSIS — G89.29 CHRONIC BACK PAIN GREATER THAN 3 MONTHS DURATION: ICD-10-CM

## 2018-03-20 DIAGNOSIS — M89.9 BONE DISORDER: ICD-10-CM

## 2018-03-20 DIAGNOSIS — D72.829 LEUKOCYTOSIS, UNSPECIFIED TYPE: ICD-10-CM

## 2018-03-20 DIAGNOSIS — R20.0 NUMBNESS AND TINGLING: ICD-10-CM

## 2018-03-20 LAB
6-ACETYLMORPHINE, UR: NOT DETECTED
7-AMINOCLONAZEPAM, URINE: NOT DETECTED
ABSOLUTE EOS #: 0.3 K/UL (ref 0–0.4)
ABSOLUTE IMMATURE GRANULOCYTE: ABNORMAL K/UL (ref 0–0.3)
ABSOLUTE LYMPH #: 1.5 K/UL (ref 1–4.8)
ABSOLUTE MONO #: 0.5 K/UL (ref 0.1–1.3)
ALPHA-OH-ALPRAZ, URINE: NOT DETECTED
ALPRAZOLAM, URINE: NOT DETECTED
AMPHETAMINES, URINE: NOT DETECTED
BARBITURATES, URINE: NOT DETECTED
BASOPHILS # BLD: 1 % (ref 0–2)
BASOPHILS ABSOLUTE: 0.1 K/UL (ref 0–0.2)
BENZOYLECGONINE, UR: PRESENT
BUPRENORPHINE URINE: NOT DETECTED
CARISOPRODOL, UR: NOT DETECTED
CLONAZEPAM, URINE: NOT DETECTED
CODEINE, URINE: NOT DETECTED
CREATININE URINE: 169.2 MG/DL (ref 20–400)
DIAZEPAM, URINE: NOT DETECTED
DIFFERENTIAL TYPE: ABNORMAL
DRUGS EXPECTED, UR: NORMAL
EER HI RES INTERP UR: NORMAL
EOSINOPHILS RELATIVE PERCENT: 4 % (ref 0–4)
ESTIMATED AVERAGE GLUCOSE: 100 MG/DL
ETHYL GLUCURONIDE UR: NOT DETECTED
FENTANYL URINE: NOT DETECTED
FOLATE: >20 NG/ML
HBA1C MFR BLD: 5.1 % (ref 4–6)
HCT VFR BLD CALC: 42.4 % (ref 36–46)
HEMOGLOBIN: 14.3 G/DL (ref 12–16)
HYDROCODONE, URINE: NOT DETECTED
HYDROMORPHONE, URINE: NOT DETECTED
IMMATURE GRANULOCYTES: ABNORMAL %
LORAZEPAM, URINE: NOT DETECTED
LYMPHOCYTES # BLD: 18 % (ref 24–44)
MARIJUANA METAB, UR: NOT DETECTED
MCH RBC QN AUTO: 28.3 PG (ref 26–34)
MCHC RBC AUTO-ENTMCNC: 33.6 G/DL (ref 31–37)
MCV RBC AUTO: 84.3 FL (ref 80–100)
MDA, UR: NOT DETECTED
MDEA, EVE, UR: NOT DETECTED
MDMA URINE: NOT DETECTED
MEPERIDINE METAB, UR: NOT DETECTED
METHADONE, URINE: NOT DETECTED
METHAMPHETAMINE, URINE: NOT DETECTED
METHYLPHENIDATE: NOT DETECTED
MIDAZOLAM, URINE: NOT DETECTED
MONOCYTES # BLD: 6 % (ref 1–7)
MORPHINE URINE: NOT DETECTED
NORBUPRENORPHINE, URINE: NOT DETECTED
NORDIAZEPAM, URINE: NOT DETECTED
NORFENTANYL, URINE: NOT DETECTED
NORHYDROCODONE, URINE: NOT DETECTED
NOROXYCODONE, URINE: NOT DETECTED
NOROXYMORPHONE, URINE: NOT DETECTED
NRBC AUTOMATED: ABNORMAL PER 100 WBC
OXAZEPAM, URINE: NOT DETECTED
OXYCODONE URINE: NOT DETECTED
OXYMORPHONE, URINE: NOT DETECTED
PAIN MANAGEMENT DRUG PANEL INTERP, URINE: NORMAL
PAIN MGT DRUG PANEL, HI RES, UR: NORMAL
PCP,URINE: NOT DETECTED
PDW BLD-RTO: 14.2 % (ref 11.5–14.9)
PHENTERMINE, UR: NOT DETECTED
PLATELET # BLD: 246 K/UL (ref 150–450)
PLATELET ESTIMATE: ABNORMAL
PMV BLD AUTO: 7.9 FL (ref 6–12)
PROPOXYPHENE, URINE: NOT DETECTED
RBC # BLD: 5.03 M/UL (ref 4–5.2)
RBC # BLD: ABNORMAL 10*6/UL
SEG NEUTROPHILS: 71 % (ref 36–66)
SEGMENTED NEUTROPHILS ABSOLUTE COUNT: 5.7 K/UL (ref 1.3–9.1)
TAPENTADOL, URINE: NOT DETECTED
TAPENTADOL-O-SULFATE, URINE: NOT DETECTED
TEMAZEPAM, URINE: NOT DETECTED
TRAMADOL, URINE: NOT DETECTED
VITAMIN B-12: 652 PG/ML (ref 232–1245)
VITAMIN D 25-HYDROXY: 55.5 NG/ML (ref 30–100)
WBC # BLD: 8.1 K/UL (ref 3.5–11)
WBC # BLD: ABNORMAL 10*3/UL
ZOLPIDEM, URINE: NOT DETECTED

## 2018-03-20 PROCEDURE — 85025 COMPLETE CBC W/AUTO DIFF WBC: CPT

## 2018-03-20 PROCEDURE — 82746 ASSAY OF FOLIC ACID SERUM: CPT

## 2018-03-20 PROCEDURE — 36415 COLL VENOUS BLD VENIPUNCTURE: CPT

## 2018-03-20 PROCEDURE — 82607 VITAMIN B-12: CPT

## 2018-03-20 PROCEDURE — 83036 HEMOGLOBIN GLYCOSYLATED A1C: CPT

## 2018-03-20 PROCEDURE — 82306 VITAMIN D 25 HYDROXY: CPT

## 2018-04-12 ENCOUNTER — TELEPHONE (OUTPATIENT)
Dept: FAMILY MEDICINE CLINIC | Age: 48
End: 2018-04-12

## 2018-04-12 DIAGNOSIS — Z23 NEED FOR DIPHTHERIA-TETANUS-PERTUSSIS (TDAP) VACCINE: ICD-10-CM

## 2018-04-12 DIAGNOSIS — M51.24 THORACIC DISC HERNIATION: ICD-10-CM

## 2018-04-12 DIAGNOSIS — R29.6 FALLS FREQUENTLY: ICD-10-CM

## 2018-04-12 DIAGNOSIS — G60.0 CHARCOT-MARIE-TOOTH DISEASE: Chronic | ICD-10-CM

## 2018-04-12 DIAGNOSIS — M48.04 SPINAL STENOSIS, THORACIC: ICD-10-CM

## 2018-04-12 DIAGNOSIS — M54.9 CHRONIC BACK PAIN GREATER THAN 3 MONTHS DURATION: ICD-10-CM

## 2018-04-12 DIAGNOSIS — G89.29 CHRONIC BACK PAIN GREATER THAN 3 MONTHS DURATION: ICD-10-CM

## 2018-04-13 RX ORDER — GABAPENTIN 100 MG/1
CAPSULE ORAL
Qty: 30 CAPSULE | Refills: 0 | OUTPATIENT
Start: 2018-04-13

## 2018-04-13 RX ORDER — GABAPENTIN 300 MG/1
300 CAPSULE ORAL 3 TIMES DAILY
Qty: 21 CAPSULE | Refills: 0 | Status: SHIPPED | OUTPATIENT
Start: 2018-04-13 | End: 2019-03-07

## 2019-03-07 ENCOUNTER — APPOINTMENT (OUTPATIENT)
Dept: CT IMAGING | Age: 49
End: 2019-03-07
Payer: MEDICARE

## 2019-03-07 ENCOUNTER — HOSPITAL ENCOUNTER (EMERGENCY)
Age: 49
Discharge: HOME | End: 2019-03-07
Attending: EMERGENCY MEDICINE
Payer: MEDICARE

## 2019-03-07 VITALS
BODY MASS INDEX: 31.89 KG/M2 | OXYGEN SATURATION: 98 % | HEART RATE: 84 BPM | RESPIRATION RATE: 15 BRPM | WEIGHT: 180 LBS | SYSTOLIC BLOOD PRESSURE: 179 MMHG | DIASTOLIC BLOOD PRESSURE: 105 MMHG | TEMPERATURE: 98.1 F | HEIGHT: 63 IN

## 2019-03-07 DIAGNOSIS — R10.9 FLANK PAIN: Primary | ICD-10-CM

## 2019-03-07 LAB
-: ABNORMAL
ABSOLUTE EOS #: 0.6 K/UL (ref 0–0.4)
ABSOLUTE IMMATURE GRANULOCYTE: ABNORMAL K/UL (ref 0–0.3)
ABSOLUTE LYMPH #: 1.9 K/UL (ref 1–4.8)
ABSOLUTE MONO #: 0.7 K/UL (ref 0.1–1.3)
ALBUMIN SERPL-MCNC: 4 G/DL (ref 3.5–5.2)
ALBUMIN/GLOBULIN RATIO: ABNORMAL (ref 1–2.5)
ALP BLD-CCNC: 93 U/L (ref 35–104)
ALT SERPL-CCNC: 15 U/L (ref 5–33)
AMORPHOUS: ABNORMAL
ANION GAP SERPL CALCULATED.3IONS-SCNC: 12 MMOL/L (ref 9–17)
AST SERPL-CCNC: 13 U/L
BACTERIA: ABNORMAL
BASOPHILS # BLD: 1 % (ref 0–2)
BASOPHILS ABSOLUTE: 0.1 K/UL (ref 0–0.2)
BILIRUB SERPL-MCNC: 0.3 MG/DL (ref 0.3–1.2)
BILIRUBIN URINE: NEGATIVE
BUN BLDV-MCNC: 10 MG/DL (ref 6–20)
BUN/CREAT BLD: ABNORMAL (ref 9–20)
CALCIUM SERPL-MCNC: 9.5 MG/DL (ref 8.6–10.4)
CASTS UA: ABNORMAL /LPF
CHLORIDE BLD-SCNC: 101 MMOL/L (ref 98–107)
CO2: 24 MMOL/L (ref 20–31)
COLOR: YELLOW
COMMENT UA: ABNORMAL
CREAT SERPL-MCNC: 0.65 MG/DL (ref 0.5–0.9)
CRYSTALS, UA: ABNORMAL /HPF
DIFFERENTIAL TYPE: ABNORMAL
EOSINOPHILS RELATIVE PERCENT: 5 % (ref 0–4)
EPITHELIAL CELLS UA: ABNORMAL /HPF
GFR AFRICAN AMERICAN: >60 ML/MIN
GFR NON-AFRICAN AMERICAN: >60 ML/MIN
GFR SERPL CREATININE-BSD FRML MDRD: ABNORMAL ML/MIN/{1.73_M2}
GFR SERPL CREATININE-BSD FRML MDRD: ABNORMAL ML/MIN/{1.73_M2}
GLUCOSE BLD-MCNC: 102 MG/DL (ref 70–99)
GLUCOSE URINE: NEGATIVE
HCG(URINE) PREGNANCY TEST: NEGATIVE
HCT VFR BLD CALC: 42.8 % (ref 36–46)
HEMOGLOBIN: 14 G/DL (ref 12–16)
IMMATURE GRANULOCYTES: ABNORMAL %
KETONES, URINE: NEGATIVE
LEUKOCYTE ESTERASE, URINE: ABNORMAL
LIPASE: 57 U/L (ref 13–60)
LYMPHOCYTES # BLD: 17 % (ref 24–44)
MCH RBC QN AUTO: 28 PG (ref 26–34)
MCHC RBC AUTO-ENTMCNC: 32.7 G/DL (ref 31–37)
MCV RBC AUTO: 85.5 FL (ref 80–100)
MONOCYTES # BLD: 6 % (ref 1–7)
MUCUS: ABNORMAL
NITRITE, URINE: NEGATIVE
NRBC AUTOMATED: ABNORMAL PER 100 WBC
OTHER OBSERVATIONS UA: ABNORMAL
PDW BLD-RTO: 13.4 % (ref 11.5–14.9)
PH UA: 5.5 (ref 5–8)
PLATELET # BLD: 272 K/UL (ref 150–450)
PLATELET ESTIMATE: ABNORMAL
PMV BLD AUTO: 8.3 FL (ref 6–12)
POTASSIUM SERPL-SCNC: 3.9 MMOL/L (ref 3.7–5.3)
PROTEIN UA: NEGATIVE
RBC # BLD: 5 M/UL (ref 4–5.2)
RBC # BLD: ABNORMAL 10*6/UL
RBC UA: ABNORMAL /HPF
RENAL EPITHELIAL, UA: ABNORMAL /HPF
SEG NEUTROPHILS: 71 % (ref 36–66)
SEGMENTED NEUTROPHILS ABSOLUTE COUNT: 8.3 K/UL (ref 1.3–9.1)
SODIUM BLD-SCNC: 137 MMOL/L (ref 135–144)
SPECIFIC GRAVITY UA: 1.01 (ref 1–1.03)
TOTAL PROTEIN: 6.9 G/DL (ref 6.4–8.3)
TRICHOMONAS: ABNORMAL
TURBIDITY: ABNORMAL
URINE HGB: ABNORMAL
UROBILINOGEN, URINE: NORMAL
WBC # BLD: 11.5 K/UL (ref 3.5–11)
WBC # BLD: ABNORMAL 10*3/UL
WBC UA: ABNORMAL /HPF
YEAST: ABNORMAL

## 2019-03-07 PROCEDURE — 87088 URINE BACTERIA CULTURE: CPT

## 2019-03-07 PROCEDURE — 2580000003 HC RX 258: Performed by: EMERGENCY MEDICINE

## 2019-03-07 PROCEDURE — 84703 CHORIONIC GONADOTROPIN ASSAY: CPT

## 2019-03-07 PROCEDURE — 87086 URINE CULTURE/COLONY COUNT: CPT

## 2019-03-07 PROCEDURE — 96372 THER/PROPH/DIAG INJ SC/IM: CPT

## 2019-03-07 PROCEDURE — 80053 COMPREHEN METABOLIC PANEL: CPT

## 2019-03-07 PROCEDURE — 99284 EMERGENCY DEPT VISIT MOD MDM: CPT

## 2019-03-07 PROCEDURE — 6360000002 HC RX W HCPCS: Performed by: EMERGENCY MEDICINE

## 2019-03-07 PROCEDURE — 87186 SC STD MICRODIL/AGAR DIL: CPT

## 2019-03-07 PROCEDURE — 83690 ASSAY OF LIPASE: CPT

## 2019-03-07 PROCEDURE — 85025 COMPLETE CBC W/AUTO DIFF WBC: CPT

## 2019-03-07 PROCEDURE — 36415 COLL VENOUS BLD VENIPUNCTURE: CPT

## 2019-03-07 PROCEDURE — 96374 THER/PROPH/DIAG INJ IV PUSH: CPT

## 2019-03-07 PROCEDURE — 74176 CT ABD & PELVIS W/O CONTRAST: CPT

## 2019-03-07 PROCEDURE — 81001 URINALYSIS AUTO W/SCOPE: CPT

## 2019-03-07 RX ORDER — HALOPERIDOL 5 MG/ML
5 INJECTION INTRAMUSCULAR ONCE
Status: COMPLETED | OUTPATIENT
Start: 2019-03-07 | End: 2019-03-07

## 2019-03-07 RX ORDER — KETOROLAC TROMETHAMINE 30 MG/ML
30 INJECTION, SOLUTION INTRAMUSCULAR; INTRAVENOUS ONCE
Status: COMPLETED | OUTPATIENT
Start: 2019-03-07 | End: 2019-03-07

## 2019-03-07 RX ORDER — 0.9 % SODIUM CHLORIDE 0.9 %
1000 INTRAVENOUS SOLUTION INTRAVENOUS ONCE
Status: COMPLETED | OUTPATIENT
Start: 2019-03-07 | End: 2019-03-07

## 2019-03-07 RX ADMIN — HALOPERIDOL LACTATE 5 MG: 5 INJECTION, SOLUTION INTRAMUSCULAR at 23:07

## 2019-03-07 RX ADMIN — KETOROLAC TROMETHAMINE 30 MG: 30 INJECTION, SOLUTION INTRAMUSCULAR; INTRAVENOUS at 21:37

## 2019-03-07 RX ADMIN — SODIUM CHLORIDE 1000 ML: 9 INJECTION, SOLUTION INTRAVENOUS at 21:37

## 2019-03-07 ASSESSMENT — PAIN DESCRIPTION - ORIENTATION: ORIENTATION: RIGHT

## 2019-03-07 ASSESSMENT — PAIN SCALES - GENERAL: PAINLEVEL_OUTOF10: 10

## 2019-03-07 ASSESSMENT — PAIN DESCRIPTION - FREQUENCY: FREQUENCY: CONTINUOUS

## 2019-03-07 ASSESSMENT — PAIN DESCRIPTION - LOCATION: LOCATION: FLANK

## 2019-03-07 ASSESSMENT — PAIN DESCRIPTION - DESCRIPTORS: DESCRIPTORS: SHARP;CONSTANT

## 2019-03-08 ENCOUNTER — TELEPHONE (OUTPATIENT)
Dept: FAMILY MEDICINE CLINIC | Age: 49
End: 2019-03-08

## 2019-03-09 LAB
CULTURE: ABNORMAL
Lab: ABNORMAL
SPECIMEN DESCRIPTION: ABNORMAL

## 2019-03-19 ENCOUNTER — HOSPITAL ENCOUNTER (OUTPATIENT)
Age: 49
Setting detail: SPECIMEN
Discharge: HOME | End: 2019-03-19
Payer: MEDICARE

## 2019-03-19 ENCOUNTER — OFFICE VISIT (OUTPATIENT)
Dept: FAMILY MEDICINE CLINIC | Age: 49
End: 2019-03-19
Payer: MEDICARE

## 2019-03-19 VITALS
HEIGHT: 63 IN | SYSTOLIC BLOOD PRESSURE: 133 MMHG | HEART RATE: 74 BPM | WEIGHT: 201 LBS | RESPIRATION RATE: 17 BRPM | TEMPERATURE: 98.3 F | DIASTOLIC BLOOD PRESSURE: 82 MMHG | OXYGEN SATURATION: 98 % | BODY MASS INDEX: 35.61 KG/M2

## 2019-03-19 DIAGNOSIS — N63.0 BREAST LUMP IN FEMALE: ICD-10-CM

## 2019-03-19 DIAGNOSIS — N30.01 ACUTE CYSTITIS WITH HEMATURIA: ICD-10-CM

## 2019-03-19 DIAGNOSIS — R92.1 MAMMOGRAPHIC CALCIFICATION FOUND ON DIAGNOSTIC IMAGING OF BREAST: ICD-10-CM

## 2019-03-19 DIAGNOSIS — R10.9 FLANK PAIN: Primary | ICD-10-CM

## 2019-03-19 DIAGNOSIS — F33.1 MODERATE EPISODE OF RECURRENT MAJOR DEPRESSIVE DISORDER (HCC): ICD-10-CM

## 2019-03-19 DIAGNOSIS — N28.9 RENAL DISEASE: ICD-10-CM

## 2019-03-19 PROBLEM — D72.829 LEUKOCYTOSIS: Status: RESOLVED | Noted: 2018-03-06 | Resolved: 2019-03-19

## 2019-03-19 PROBLEM — R73.9 HYPERGLYCEMIA: Status: RESOLVED | Noted: 2018-03-06 | Resolved: 2019-03-19

## 2019-03-19 PROBLEM — R00.0 TACHYCARDIA: Status: RESOLVED | Noted: 2018-03-06 | Resolved: 2019-03-19

## 2019-03-19 LAB
-: ABNORMAL
AMORPHOUS: ABNORMAL
BACTERIA: ABNORMAL
BILIRUBIN URINE: NEGATIVE
BILIRUBIN, POC: NEGATIVE
BLOOD URINE, POC: NEGATIVE
CASTS UA: ABNORMAL /LPF (ref 0–8)
CLARITY, POC: NORMAL
COLOR, POC: YELLOW
COLOR: YELLOW
COMMENT UA: ABNORMAL
CRYSTALS, UA: ABNORMAL /HPF
EPITHELIAL CELLS UA: ABNORMAL /HPF (ref 0–5)
GLUCOSE URINE, POC: NEGATIVE
GLUCOSE URINE: NEGATIVE
KETONES, POC: NEGATIVE
KETONES, URINE: NEGATIVE
LEUKOCYTE EST, POC: NORMAL
LEUKOCYTE ESTERASE, URINE: ABNORMAL
MUCUS: ABNORMAL
NITRITE, POC: NEGATIVE
NITRITE, URINE: NEGATIVE
OTHER OBSERVATIONS UA: ABNORMAL
PH UA: 7 (ref 5–8)
PH, POC: 5.5
PROTEIN UA: NEGATIVE
PROTEIN, POC: NEGATIVE
RBC UA: ABNORMAL /HPF (ref 0–4)
RENAL EPITHELIAL, UA: ABNORMAL /HPF
SPECIFIC GRAVITY UA: 1.02 (ref 1–1.03)
SPECIFIC GRAVITY, POC: 1.02
TRICHOMONAS: ABNORMAL
TURBIDITY: ABNORMAL
URINE HGB: NEGATIVE
UROBILINOGEN, POC: NORMAL
UROBILINOGEN, URINE: NORMAL
WBC UA: ABNORMAL /HPF (ref 0–5)
YEAST: ABNORMAL

## 2019-03-19 PROCEDURE — 81003 URINALYSIS AUTO W/O SCOPE: CPT | Performed by: FAMILY MEDICINE

## 2019-03-19 PROCEDURE — G8427 DOCREV CUR MEDS BY ELIG CLIN: HCPCS | Performed by: FAMILY MEDICINE

## 2019-03-19 PROCEDURE — G8417 CALC BMI ABV UP PARAM F/U: HCPCS | Performed by: FAMILY MEDICINE

## 2019-03-19 PROCEDURE — 99214 OFFICE O/P EST MOD 30 MIN: CPT | Performed by: FAMILY MEDICINE

## 2019-03-19 PROCEDURE — 1036F TOBACCO NON-USER: CPT | Performed by: FAMILY MEDICINE

## 2019-03-19 PROCEDURE — G8484 FLU IMMUNIZE NO ADMIN: HCPCS | Performed by: FAMILY MEDICINE

## 2019-03-19 RX ORDER — ACETAMINOPHEN AND CODEINE PHOSPHATE 300; 30 MG/1; MG/1
1 TABLET ORAL EVERY 8 HOURS PRN
Qty: 9 TABLET | Refills: 0 | Status: SHIPPED | OUTPATIENT
Start: 2019-03-19 | End: 2019-03-22

## 2019-03-19 ASSESSMENT — ENCOUNTER SYMPTOMS
RHINORRHEA: 0
COLOR CHANGE: 0
PHOTOPHOBIA: 0
SHORTNESS OF BREATH: 0
BLOOD IN STOOL: 0
WHEEZING: 0
DIARRHEA: 0
BACK PAIN: 0
COUGH: 0
NAUSEA: 0
ABDOMINAL DISTENTION: 0
ABDOMINAL PAIN: 0
CONSTIPATION: 0

## 2019-03-20 LAB
CULTURE: NORMAL
Lab: NORMAL
SPECIMEN DESCRIPTION: NORMAL

## 2019-03-21 ENCOUNTER — HOSPITAL ENCOUNTER (OUTPATIENT)
Dept: WOMENS IMAGING | Age: 49
Discharge: HOME | End: 2019-03-23
Payer: MEDICARE

## 2019-03-21 DIAGNOSIS — Z12.31 SCREENING MAMMOGRAM, ENCOUNTER FOR: ICD-10-CM

## 2019-03-21 DIAGNOSIS — Z12.31 SCREENING MAMMOGRAM, ENCOUNTER FOR: Primary | ICD-10-CM

## 2019-03-21 PROCEDURE — 77063 BREAST TOMOSYNTHESIS BI: CPT

## 2019-04-17 ENCOUNTER — TELEPHONE (OUTPATIENT)
Dept: FAMILY MEDICINE CLINIC | Age: 49
End: 2019-04-17

## 2019-04-17 DIAGNOSIS — F33.1 MODERATE EPISODE OF RECURRENT MAJOR DEPRESSIVE DISORDER (HCC): ICD-10-CM

## 2019-04-17 RX ORDER — FLUOXETINE HYDROCHLORIDE 20 MG/1
20 CAPSULE ORAL DAILY
Qty: 30 CAPSULE | Refills: 0 | Status: SHIPPED | OUTPATIENT
Start: 2019-04-17 | End: 2019-11-21 | Stop reason: ALTCHOICE

## 2019-04-17 NOTE — TELEPHONE ENCOUNTER
Patient has not taken her anti-depressants in a long time and lately she feels that she needs to take them but does not have an appointment until 04/22/19. She is wondering if you could please call in a script before her appointment so she can get back on her medication. Please advise.      RX: [facility] on [address]

## 2019-04-17 NOTE — TELEPHONE ENCOUNTER
Please let the patient know to  prescription from pharmacy. Requested Prescriptions     Signed Prescriptions Disp Refills    FLUoxetine (PROZAC) 20 MG capsule 30 capsule 0     Sig: Take 1 capsule by mouth daily     Authorizing Provider: Thom Casiano             Thank you!         FYI    Future Appointments   Date Time Provider Dom Trejo   4/22/2019 10:45 AM Zac Conner MD fp sc [id number]       Controlled Substances Monitoring:

## 2019-04-18 ENCOUNTER — HOSPITAL ENCOUNTER (EMERGENCY)
Age: 49
Discharge: HOME | End: 2019-04-18
Attending: EMERGENCY MEDICINE
Payer: MEDICARE

## 2019-04-18 ENCOUNTER — APPOINTMENT (OUTPATIENT)
Dept: CT IMAGING | Age: 49
End: 2019-04-18
Payer: MEDICARE

## 2019-04-18 VITALS
RESPIRATION RATE: 18 BRPM | BODY MASS INDEX: 34.96 KG/M2 | DIASTOLIC BLOOD PRESSURE: 80 MMHG | SYSTOLIC BLOOD PRESSURE: 129 MMHG | HEIGHT: 62 IN | WEIGHT: 190 LBS | TEMPERATURE: 97.4 F | HEART RATE: 100 BPM | OXYGEN SATURATION: 93 %

## 2019-04-18 DIAGNOSIS — R10.9 FLANK PAIN: Primary | ICD-10-CM

## 2019-04-18 LAB
-: ABNORMAL
ABSOLUTE EOS #: 0 K/UL (ref 0–0.4)
ABSOLUTE IMMATURE GRANULOCYTE: ABNORMAL K/UL (ref 0–0.3)
ABSOLUTE LYMPH #: 0.7 K/UL (ref 1–4.8)
ABSOLUTE MONO #: 0.3 K/UL (ref 0.1–1.3)
ALBUMIN SERPL-MCNC: 4.4 G/DL (ref 3.5–5.2)
ALBUMIN/GLOBULIN RATIO: ABNORMAL (ref 1–2.5)
ALP BLD-CCNC: 91 U/L (ref 35–104)
ALT SERPL-CCNC: 22 U/L (ref 5–33)
AMORPHOUS: ABNORMAL
ANION GAP SERPL CALCULATED.3IONS-SCNC: 14 MMOL/L (ref 9–17)
AST SERPL-CCNC: 20 U/L
BACTERIA: ABNORMAL
BASOPHILS # BLD: 1 % (ref 0–2)
BASOPHILS ABSOLUTE: 0.1 K/UL (ref 0–0.2)
BILIRUB SERPL-MCNC: 0.53 MG/DL (ref 0.3–1.2)
BILIRUBIN URINE: NEGATIVE
BUN BLDV-MCNC: 12 MG/DL (ref 6–20)
BUN/CREAT BLD: ABNORMAL (ref 9–20)
CALCIUM SERPL-MCNC: 9.3 MG/DL (ref 8.6–10.4)
CASTS UA: ABNORMAL /LPF
CHLORIDE BLD-SCNC: 106 MMOL/L (ref 98–107)
CO2: 22 MMOL/L (ref 20–31)
COLOR: ABNORMAL
COMMENT UA: ABNORMAL
CREAT SERPL-MCNC: 0.62 MG/DL (ref 0.5–0.9)
CRYSTALS, UA: ABNORMAL /HPF
DIFFERENTIAL TYPE: ABNORMAL
EOSINOPHILS RELATIVE PERCENT: 0 % (ref 0–4)
EPITHELIAL CELLS UA: ABNORMAL /HPF
GFR AFRICAN AMERICAN: >60 ML/MIN
GFR NON-AFRICAN AMERICAN: >60 ML/MIN
GFR SERPL CREATININE-BSD FRML MDRD: ABNORMAL ML/MIN/{1.73_M2}
GFR SERPL CREATININE-BSD FRML MDRD: ABNORMAL ML/MIN/{1.73_M2}
GLUCOSE BLD-MCNC: 131 MG/DL (ref 70–99)
GLUCOSE URINE: NEGATIVE
HCG(URINE) PREGNANCY TEST: NEGATIVE
HCT VFR BLD CALC: 42.3 % (ref 36–46)
HEMOGLOBIN: 14.1 G/DL (ref 12–16)
IMMATURE GRANULOCYTES: ABNORMAL %
KETONES, URINE: ABNORMAL
LEUKOCYTE ESTERASE, URINE: NEGATIVE
LIPASE: 26 U/L (ref 13–60)
LYMPHOCYTES # BLD: 6 % (ref 24–44)
MCH RBC QN AUTO: 28.3 PG (ref 26–34)
MCHC RBC AUTO-ENTMCNC: 33.2 G/DL (ref 31–37)
MCV RBC AUTO: 85.4 FL (ref 80–100)
MONOCYTES # BLD: 3 % (ref 1–7)
MUCUS: ABNORMAL
NITRITE, URINE: NEGATIVE
NRBC AUTOMATED: ABNORMAL PER 100 WBC
OTHER OBSERVATIONS UA: ABNORMAL
PDW BLD-RTO: 13.7 % (ref 11.5–14.9)
PH UA: 5.5 (ref 5–8)
PLATELET # BLD: 242 K/UL (ref 150–450)
PLATELET ESTIMATE: ABNORMAL
PMV BLD AUTO: 7.6 FL (ref 6–12)
POTASSIUM SERPL-SCNC: 3.8 MMOL/L (ref 3.7–5.3)
PROTEIN UA: ABNORMAL
RBC # BLD: 4.96 M/UL (ref 4–5.2)
RBC # BLD: ABNORMAL 10*6/UL
RBC UA: ABNORMAL /HPF
RENAL EPITHELIAL, UA: ABNORMAL /HPF
SEG NEUTROPHILS: 90 % (ref 36–66)
SEGMENTED NEUTROPHILS ABSOLUTE COUNT: 10.4 K/UL (ref 1.3–9.1)
SODIUM BLD-SCNC: 142 MMOL/L (ref 135–144)
SPECIFIC GRAVITY UA: 1.02 (ref 1–1.03)
TOTAL PROTEIN: 7.4 G/DL (ref 6.4–8.3)
TRICHOMONAS: ABNORMAL
TURBIDITY: ABNORMAL
URINE HGB: ABNORMAL
UROBILINOGEN, URINE: NORMAL
WBC # BLD: 11.5 K/UL (ref 3.5–11)
WBC # BLD: ABNORMAL 10*3/UL
WBC UA: ABNORMAL /HPF
YEAST: ABNORMAL

## 2019-04-18 PROCEDURE — 6370000000 HC RX 637 (ALT 250 FOR IP): Performed by: PHYSICIAN ASSISTANT

## 2019-04-18 PROCEDURE — 84703 CHORIONIC GONADOTROPIN ASSAY: CPT

## 2019-04-18 PROCEDURE — 83690 ASSAY OF LIPASE: CPT

## 2019-04-18 PROCEDURE — 36415 COLL VENOUS BLD VENIPUNCTURE: CPT

## 2019-04-18 PROCEDURE — 85025 COMPLETE CBC W/AUTO DIFF WBC: CPT

## 2019-04-18 PROCEDURE — 96374 THER/PROPH/DIAG INJ IV PUSH: CPT

## 2019-04-18 PROCEDURE — 6360000002 HC RX W HCPCS: Performed by: PHYSICIAN ASSISTANT

## 2019-04-18 PROCEDURE — 74176 CT ABD & PELVIS W/O CONTRAST: CPT

## 2019-04-18 PROCEDURE — 81001 URINALYSIS AUTO W/SCOPE: CPT

## 2019-04-18 PROCEDURE — 2580000003 HC RX 258: Performed by: PHYSICIAN ASSISTANT

## 2019-04-18 PROCEDURE — 96375 TX/PRO/DX INJ NEW DRUG ADDON: CPT

## 2019-04-18 PROCEDURE — 80053 COMPREHEN METABOLIC PANEL: CPT

## 2019-04-18 PROCEDURE — 99284 EMERGENCY DEPT VISIT MOD MDM: CPT

## 2019-04-18 RX ORDER — 0.9 % SODIUM CHLORIDE 0.9 %
1000 INTRAVENOUS SOLUTION INTRAVENOUS ONCE
Status: COMPLETED | OUTPATIENT
Start: 2019-04-18 | End: 2019-04-18

## 2019-04-18 RX ORDER — FENTANYL CITRATE 50 UG/ML
25 INJECTION, SOLUTION INTRAMUSCULAR; INTRAVENOUS ONCE
Status: COMPLETED | OUTPATIENT
Start: 2019-04-18 | End: 2019-04-18

## 2019-04-18 RX ORDER — ONDANSETRON 2 MG/ML
4 INJECTION INTRAMUSCULAR; INTRAVENOUS ONCE
Status: COMPLETED | OUTPATIENT
Start: 2019-04-18 | End: 2019-04-18

## 2019-04-18 RX ORDER — HYDROCODONE BITARTRATE AND ACETAMINOPHEN 5; 325 MG/1; MG/1
1 TABLET ORAL ONCE
Status: COMPLETED | OUTPATIENT
Start: 2019-04-18 | End: 2019-04-18

## 2019-04-18 RX ORDER — HYDROCODONE BITARTRATE AND ACETAMINOPHEN 5; 325 MG/1; MG/1
1 TABLET ORAL EVERY 4 HOURS PRN
Qty: 12 TABLET | Refills: 0 | Status: SHIPPED | OUTPATIENT
Start: 2019-04-18 | End: 2019-04-21

## 2019-04-18 RX ADMIN — ONDANSETRON 4 MG: 2 INJECTION INTRAMUSCULAR; INTRAVENOUS at 10:41

## 2019-04-18 RX ADMIN — SODIUM CHLORIDE 1000 ML: 9 INJECTION, SOLUTION INTRAVENOUS at 10:24

## 2019-04-18 RX ADMIN — HYDROCODONE BITARTRATE AND ACETAMINOPHEN 1 TABLET: 5; 325 TABLET ORAL at 10:38

## 2019-04-18 RX ADMIN — FENTANYL CITRATE 25 MCG: 50 INJECTION, SOLUTION INTRAMUSCULAR; INTRAVENOUS at 11:35

## 2019-04-18 ASSESSMENT — PAIN DESCRIPTION - ORIENTATION: ORIENTATION: RIGHT

## 2019-04-18 ASSESSMENT — PAIN SCALES - GENERAL
PAINLEVEL_OUTOF10: 10
PAINLEVEL_OUTOF10: 9
PAINLEVEL_OUTOF10: 10

## 2019-04-18 ASSESSMENT — PAIN DESCRIPTION - LOCATION: LOCATION: FLANK

## 2019-04-18 ASSESSMENT — PAIN DESCRIPTION - DESCRIPTORS: DESCRIPTORS: SHARP

## 2019-04-18 ASSESSMENT — PAIN DESCRIPTION - PAIN TYPE: TYPE: ACUTE PAIN

## 2019-04-18 NOTE — ED PROVIDER NOTES
nurses notes    SURGICAL HISTORY       Past Surgical History:   Procedure Laterality Date    BREAST BIOPSY Bilateral     CHOLECYSTECTOMY, LAPAROSCOPIC  17    Dr Aminta Heredia ENDOMETRIAL ABLATION  10/19/2016    hysteroscopy; Novasure    INGUINAL HERNIA REPAIR      right    KIDNEY REMOVAL      1 kidneys removed    KNEE ARTHROSCOPY Left     KNEE ARTHROSCOPY Right 2016    TUBAL LIGATION  1994     None otherwise stated in nurses notes    CURRENT MEDICATIONS       Discharge Medication List as of 2019  1:16 PM      CONTINUE these medications which have NOT CHANGED    Details   FLUoxetine (PROZAC) 20 MG capsule Take 1 capsule by mouth daily, Disp-30 capsule, R-0Normal      ibuprofen (ADVIL;MOTRIN) 200 MG tablet Take 800 mg by mouthHistorical Med      Loratadine (CLARITIN) 10 MG CAPS Take 10 mg by mouth daily Historical Med             ALLERGIES     Penicillins    FAMILY HISTORY           Problem Relation Age of Onset    Other Mother         muscular dystrophy    High Blood Pressure Mother     Cirrhosis Father     Alcohol Abuse Father     COPD Father     Hypertension Father     Seizures Brother     Diabetes Brother     Cirrhosis Maternal Grandfather     Alcohol Abuse Maternal Grandfather     Alzheimer's Disease Paternal Grandmother     Other Paternal Grandfather         suicide     Family Status   Relation Name Status    Mother  Alive    Father      Brother  Alive    Brother  Alive    MGM      MGF      PGM      PGF        None otherwise stated in nurses notes    SOCIAL HISTORY      reports that she has never smoked. She has never used smokeless tobacco. She reports that she drinks alcohol. She reports that she does not use drugs.    lives at home with others     PHYSICAL EXAM    (up to 7 for level 4, 8 or more for level 5)     ED Triage Vitals [19 0954]   BP Temp Temp Source Pulse Resp SpO2 2. Left kidney surgically absent. Rina Digital Screen W Cad Bilateral    Result Date: 3/21/2019  EXAMINATION: BILATERAL DIGITAL SCREENING MAMMOGRAM, 3/21/2019 TECHNIQUE: CC and MLO views of the left and right breasts were obtained. Computer aided detection was utilized in the interpretation of this exam. 3D tomosynthesis images were obtained. COMPARISON: Mammograms dating back to 07/20/2010 HISTORY: Screening. No family or personal history of breast cancer. 2 stereotactic biopsies in the right breast in 2015. FINDINGS: The breasts are heterogeneously dense, which may obscure small masses. 2 clips at the sites of prior biopsies in the right breast. No suspicious masses or asymmetries, no suspicious calcifications, no architectural distortion. No significant change from prior exam.     Negative. No mammographic evidence of malignancy. RECOMMENDATION: Annual screening mammogram. BIRADS: BIRADS - CATEGORY 1 Negative, no evidence of malignancy. Normal interval follow-up is recommended in 12 months. OVERALL ASSESSMENT - NEGATIVE A letter of notification will be sent to the patient regarding the results. The [employer] recommends annual mammograms for women 40 years and older.          LABS:  Labs Reviewed   URINALYSIS - Abnormal; Notable for the following components:       Result Value    Color, UA DARK YELLOW (*)     Turbidity UA CLOUDY (*)     Ketones, Urine SMALL (*)     Urine Hgb LARGE (*)     Protein, UA 1+ (*)     All other components within normal limits   MICROSCOPIC URINALYSIS - Abnormal; Notable for the following components:    Bacteria, UA FEW (*)     Mucus, UA 1+ (*)     All other components within normal limits   CBC WITH AUTO DIFFERENTIAL - Abnormal; Notable for the following components:    WBC 11.5 (*)     Seg Neutrophils 90 (*)     Lymphocytes 6 (*)     Segs Absolute 10.40 (*)     Absolute Lymph # 0.70 (*)     All other components within normal limits   COMPREHENSIVE Right flank pain x 1 month  Became more severe and constant last night. Hx of kidney stones. Pt states this feels similar. Urinary urgency/ frequency, decreased urination. Will get basic labs, UA.  norco for pain. UA is positive for Hgb. No signs of UTI. Will get Ct abd/pel WO contrast to evaluate for kidney stone. Lab work is unremarkable. CT abd pelvis is unremarkable. No signs of kidney stone. Believe blood in urine is from vaginal canal.     I did try to find pts nephrologist.  She did not know his name and he is at the [facility] clinic. Pt has followed with her specialist regarding this issue. Will dc home. Pain is down to 5/10. Will dc home with norco.  She is to call nephrologist today for apt. Strict return instructions discussed with pt. Dr. Makenzie Chavez agrees with plan. Discussed results and plan with the pt. They expressed appropriate understanding. Pt given close follow up, supportive care instructions and strict return instructions at the bedside. ED Medications administered this visit:    Medications   0.9 % sodium chloride bolus (0 mLs Intravenous Stopped 4/18/19 1247)   HYDROcodone-acetaminophen (NORCO) 5-325 MG per tablet 1 tablet (1 tablet Oral Given 4/18/19 1038)   ondansetron (ZOFRAN) injection 4 mg (4 mg Intravenous Given 4/18/19 1041)   fentaNYL (SUBLIMAZE) injection 25 mcg (25 mcg Intravenous Given 4/18/19 1135)       New Prescriptions from this visit:    Discharge Medication List as of 4/18/2019  1:16 PM      START taking these medications    Details   HYDROcodone-acetaminophen (NORCO) 5-325 MG per tablet Take 1 tablet by mouth every 4 hours as needed for Pain for up to 3 days. Intended supply: 3 days.  Take lowest dose possible to manage pain, Disp-12 tablet, R-0Print             Follow-up:  Danitza Hooper MD  [address]  [address] [facility]  [address] [facility]    Call       [facility] ED  [facility] [address]  [phone]    If symptoms worsen        Final Impression:   1.  Flank pain               (Please note that portions of this note were completed with a voice recognition program.  Efforts were made to edit the dictations but occasionally words are mis-transcribed.)      (Please note that portions of this note were completed with a voice recognition program.  Efforts were made to edit the dictations but occasionally words are mis-transcribed.)    Ivana Tracey, [address] PA-C  04/18/19 8155

## 2019-04-18 NOTE — ED NOTES
Pt arrives to ED c/o right sided flank pain, nausea and vomiting. Patient states that she was seen and treated in the ED a couple weeks ago for flank pain. Patient states that since she has been discharged the pain has gotten better but not gone away. Patient states she followed up with both her PCP and urologist. Patient states that they cannot determine why the patient is still having pain. Patient states that up until last night and this morning the pain was tolerable but now the pain is sharp and rates it at a 10/10. Patient states that she does have a history of kidney stones and states that this feels like a stone. Patient also states that she only has a kidney on the right side. Patient states that this morning the pain was so intense that she had nausea and vomiting at home as well. Patient resting on stretcher no s/s of distress. Pt is A&Ox4, eupneic, PWD. GCS=15. Call light in reach.           Renato Buerger, RN  04/18/19 3348

## 2019-04-18 NOTE — ED PROVIDER NOTES
eMERGENCY dEPARTMENT eNCOUnter   Attending Attestation     Pt Name: Elvia Owens  MRN: [id number]  [place] 1970  Date of evaluation: 4/18/19   Elvia Owens is a 53 y.o. female with CC: Flank Pain  Acute worsening of chronic R sided flank pain and urinary sx. Follows with Urology. Non-toxic appearing on exam.     MDM:   Labs, CT, urine with no acute findings. DC with Urology follow up. CRITICAL CARE:       EKG: All EKG's are interpreted by the Emergency Department Physician who either signs or Co-signs this chart in the absence of a cardiologist.      RADIOLOGY:All plain film, CT, MRI, and formal ultrasound images (except ED bedside ultrasound) are read by the radiologist, see reports below, unless otherwise noted in MDM or here. CT ABDOMEN PELVIS WO CONTRAST Additional Contrast? None   Preliminary Result   1. No acute abdominal or pelvic abnormality. No right urinary tract calculi   or obstructive uropathy. 2. Left kidney surgically absent. LABS: All lab results were reviewed by myself, and all abnormals are listed below.   Labs Reviewed   URINALYSIS - Abnormal; Notable for the following components:       Result Value    Color, UA DARK YELLOW (*)     Turbidity UA CLOUDY (*)     Ketones, Urine SMALL (*)     Urine Hgb LARGE (*)     Protein, UA 1+ (*)     All other components within normal limits   MICROSCOPIC URINALYSIS - Abnormal; Notable for the following components:    Bacteria, UA FEW (*)     Mucus, UA 1+ (*)     All other components within normal limits   CBC WITH AUTO DIFFERENTIAL - Abnormal; Notable for the following components:    WBC 11.5 (*)     Seg Neutrophils 90 (*)     Lymphocytes 6 (*)     Segs Absolute 10.40 (*)     Absolute Lymph # 0.70 (*)     All other components within normal limits   COMPREHENSIVE METABOLIC PANEL W/ REFLEX TO MG FOR LOW K - Abnormal; Notable for the following components:    Glucose 131 (*)     All other components within normal limits PREGNANCY, URINE   LIPASE           I personally evaluated and examined the patient in conjunction with the APC and agree with the assessment, treatment plan, and disposition of the patient as recorded by the APC.    David Delvalle MD  Attending Emergency Physician          Love Hernandez MD  04/18/19 9433

## 2019-04-18 NOTE — TELEPHONE ENCOUNTER
Pt stated she is having kidney pain and is going to the ED. I offered apt at 2 with Dr. Orquidea Cantu, but she stated she is in so much pain she can't wait.

## 2019-04-19 ENCOUNTER — TELEPHONE (OUTPATIENT)
Dept: FAMILY MEDICINE CLINIC | Age: 49
End: 2019-04-19

## 2019-04-19 NOTE — TELEPHONE ENCOUNTER
[facility] ([facility]) ED Follow up Call    Reason for ED visit:  Flank pain         Aries Ordaz , this is Alejandrina from Dr. Faye Mejia office, just calling to see how you are doing after your recent ED visit. Did you receive discharge instructions? Yes  Do you understand the discharge instructions? Yes  Did the ED give you any new prescriptions? Yes  Were you able to fill your prescriptions? Yes      Do you have one of our red, yellow and green  Zone sheets that help you to determine when you should go to the ED? Yes      Do you need or want to make a follow up appt with your PCP? Not Applicable    Do you have any further needs in the home i.e. Equipment?   No        FU appts/Provider:    Future Appointments   Date Time Provider Dom Trejo   4/22/2019 10:45 AM [facility]

## 2019-04-22 ENCOUNTER — HOSPITAL ENCOUNTER (OUTPATIENT)
Age: 49
Setting detail: SPECIMEN
Discharge: HOME | End: 2019-04-22
Payer: MEDICARE

## 2019-04-22 ENCOUNTER — OFFICE VISIT (OUTPATIENT)
Dept: FAMILY MEDICINE CLINIC | Age: 49
End: 2019-04-22
Payer: MEDICARE

## 2019-04-22 VITALS
SYSTOLIC BLOOD PRESSURE: 162 MMHG | HEART RATE: 76 BPM | WEIGHT: 199.4 LBS | DIASTOLIC BLOOD PRESSURE: 103 MMHG | OXYGEN SATURATION: 97 % | BODY MASS INDEX: 35.33 KG/M2 | HEIGHT: 63 IN

## 2019-04-22 DIAGNOSIS — N28.9 RENAL DISEASE: ICD-10-CM

## 2019-04-22 DIAGNOSIS — I10 HYPERTENSION, UNSPECIFIED TYPE: ICD-10-CM

## 2019-04-22 DIAGNOSIS — R31.9 HEMATURIA, UNSPECIFIED TYPE: ICD-10-CM

## 2019-04-22 DIAGNOSIS — R10.9 FLANK PAIN: ICD-10-CM

## 2019-04-22 DIAGNOSIS — N30.01 ACUTE CYSTITIS WITH HEMATURIA: ICD-10-CM

## 2019-04-22 DIAGNOSIS — R73.9 HYPERGLYCEMIA: ICD-10-CM

## 2019-04-22 DIAGNOSIS — N92.6 MENSTRUAL ABNORMALITY: Primary | ICD-10-CM

## 2019-04-22 LAB
-: ABNORMAL
AMORPHOUS: ABNORMAL
BACTERIA: ABNORMAL
BILIRUBIN URINE: NEGATIVE
CASTS UA: ABNORMAL /LPF
COLOR: YELLOW
COMMENT UA: ABNORMAL
CRYSTALS, UA: ABNORMAL /HPF
EPITHELIAL CELLS UA: ABNORMAL /HPF
GLUCOSE URINE: NEGATIVE
HBA1C MFR BLD: 4.7 %
KETONES, URINE: NEGATIVE
LEUKOCYTE ESTERASE, URINE: ABNORMAL
MUCUS: ABNORMAL
NITRITE, URINE: NEGATIVE
OTHER OBSERVATIONS UA: ABNORMAL
PH UA: 7 (ref 5–8)
PROTEIN UA: NEGATIVE
RBC UA: ABNORMAL /HPF
RENAL EPITHELIAL, UA: ABNORMAL /HPF
SPECIFIC GRAVITY UA: 1.02 (ref 1–1.03)
TRICHOMONAS: ABNORMAL
TURBIDITY: ABNORMAL
URINE HGB: NEGATIVE
UROBILINOGEN, URINE: NORMAL
WBC UA: ABNORMAL /HPF
YEAST: ABNORMAL

## 2019-04-22 PROCEDURE — G8427 DOCREV CUR MEDS BY ELIG CLIN: HCPCS | Performed by: FAMILY MEDICINE

## 2019-04-22 PROCEDURE — 1036F TOBACCO NON-USER: CPT | Performed by: FAMILY MEDICINE

## 2019-04-22 PROCEDURE — G8417 CALC BMI ABV UP PARAM F/U: HCPCS | Performed by: FAMILY MEDICINE

## 2019-04-22 PROCEDURE — 81001 URINALYSIS AUTO W/SCOPE: CPT

## 2019-04-22 PROCEDURE — 99214 OFFICE O/P EST MOD 30 MIN: CPT | Performed by: FAMILY MEDICINE

## 2019-04-22 PROCEDURE — G0444 DEPRESSION SCREEN ANNUAL: HCPCS | Performed by: FAMILY MEDICINE

## 2019-04-22 PROCEDURE — 83036 HEMOGLOBIN GLYCOSYLATED A1C: CPT | Performed by: FAMILY MEDICINE

## 2019-04-22 ASSESSMENT — ENCOUNTER SYMPTOMS
ANAL BLEEDING: 0
SHORTNESS OF BREATH: 0
VOMITING: 0
SINUS PAIN: 0
SINUS PRESSURE: 0
PHOTOPHOBIA: 0
ABDOMINAL DISTENTION: 0
CHEST TIGHTNESS: 0
BLOOD IN STOOL: 0
COUGH: 0
WHEEZING: 0
ABDOMINAL PAIN: 0
COLOR CHANGE: 0
RHINORRHEA: 0
DIARRHEA: 0

## 2019-04-22 ASSESSMENT — PATIENT HEALTH QUESTIONNAIRE - PHQ9
9. THOUGHTS THAT YOU WOULD BE BETTER OFF DEAD, OR OF HURTING YOURSELF: 0
SUM OF ALL RESPONSES TO PHQ QUESTIONS 1-9: 19
6. FEELING BAD ABOUT YOURSELF - OR THAT YOU ARE A FAILURE OR HAVE LET YOURSELF OR YOUR FAMILY DOWN: 3
3. TROUBLE FALLING OR STAYING ASLEEP: 3
1. LITTLE INTEREST OR PLEASURE IN DOING THINGS: 3
10. IF YOU CHECKED OFF ANY PROBLEMS, HOW DIFFICULT HAVE THESE PROBLEMS MADE IT FOR YOU TO DO YOUR WORK, TAKE CARE OF THINGS AT HOME, OR GET ALONG WITH OTHER PEOPLE: 2
5. POOR APPETITE OR OVEREATING: 3
7. TROUBLE CONCENTRATING ON THINGS, SUCH AS READING THE NEWSPAPER OR WATCHING TELEVISION: 3
8. MOVING OR SPEAKING SO SLOWLY THAT OTHER PEOPLE COULD HAVE NOTICED. OR THE OPPOSITE, BEING SO FIGETY OR RESTLESS THAT YOU HAVE BEEN MOVING AROUND A LOT MORE THAN USUAL: 0
SUM OF ALL RESPONSES TO PHQ9 QUESTIONS 1 & 2: 4
2. FEELING DOWN, DEPRESSED OR HOPELESS: 1
SUM OF ALL RESPONSES TO PHQ QUESTIONS 1-9: 19
4. FEELING TIRED OR HAVING LITTLE ENERGY: 3

## 2019-04-22 NOTE — PROGRESS NOTES
Visit Information    Have you changed or started any medications since your last visit including any over-the-counter medicines, vitamins, or herbal medicines? no   Are you having any side effects from any of your medications? -  no  Have you stopped taking any of your medications? Is so, why? -  no    Have you seen any other physician or provider since your last visit? Yes - Records Obtained  Have you had any other diagnostic tests since your last visit? Yes - Records Obtained  Have you been seen in the emergency room and/or had an admission to a hospital since we last saw you? Yes - Records Obtained  Have you had your routine dental cleaning in the past 6 months? no    Have you activated your [employer] account? If not, what are your barriers?  Yes     Patient Care Team:  Barry Barraza MD as PCP - General (Family Medicine)  Corky Jeffery MD as Surgeon (Orthopedic Surgery)  Loreto Ellsworth DO as Consulting Physician (Obstetrics & Gynecology)  Niall Cage as Consulting Physician (Allergy)  Jessee Martinez MD as Consulting Physician (Neurology)  Dorotha Merlin, MD as Consulting Physician (Pain Management)  FELICE Mejia as  (Licensed Clinical )    Medical History Review  Past Medical, Family, and Social History reviewed and does contribute to the patient presenting condition    Health Maintenance   Topic Date Due    DTaP/Tdap/Td vaccine (1 - Tdap) 05/12/1990    Cervical cancer screen  06/21/2017    Flu vaccine (Season Ended) 09/01/2019    Diabetes screen  03/20/2021    Lipid screen  01/12/2023    HIV screen  Completed    Pneumococcal 0-64 years Vaccine  Aged Out

## 2019-04-22 NOTE — PROGRESS NOTES
Chief Complaint   Patient presents with   [facility] ED Follow-up     flank pain, CT schedule 4/30    Depression    Anxiety         Sushma Reed  here today for follow up on chronic medical problems, go over labs and/or diagnostic studies, and medication refills. ED Follow-up (flank pain, CT schedule 4/30); Depression; and Anxiety      HPI: Patient is here for ED follow-up had 2 ER visits back-to-back for her right flank pain, has history of renal stone reports they did the blood work which came back normal.  Patient has seen urologist at [facility] and has CT abdomen and pelvis with contrast ordered as scheduled by the end of this month. Patient reports he looked at the scans and blood work. Patient reports she still has this pain in the right flank which comes on and off, he was given Percocets on 18th April in the ER which is helping with the pain. She had UA done that showed hematuria reports she was in her periods but patient is not sure because she had pelvic exam also done and the nurse reports it was vaginal blood. Patient reports she had regular menstrual periods, comes after every 3 weeks and have been heavy at the uterine ablation done one year before. She had pelvic ultrasound done 3 years before and that showed thickened endometrium. She has not seen ObGyn since last 1 year. Blood pressure high, normal in the past.          BP (!) 162/103   Pulse 76   Ht 5' 3\" (1.6 m)   Wt 199 lb 6.4 oz (90.4 kg)   SpO2 97% Comment: resting @ RA  BMI 35.32 kg/m²    Body mass index is 35.32 kg/m². Wt Readings from Last 3 Encounters:   04/22/19 199 lb 6.4 oz (90.4 kg)   04/18/19 190 lb (86.2 kg)   03/19/19 201 lb (91.2 kg)        []Negative depression screening.   PHQ Scores 4/22/2019 3/6/2018 1/16/2018 9/23/2014   PHQ2 Score 4 3 6 6   PHQ9 Score 19 11 18 23      []1-4 = Minimal depression   []5-9 = Milddepression   []10-14 = Moderate depression   [x]15-19 = Moderately severe depression   []20-27 = Severe depression    Discussed testing with the patient and all questions fully answered. Admission on 04/18/2019, Discharged on 04/18/2019   Component Date Value Ref Range Status    Color, UA 04/18/2019 DARK YELLOW* YELLOW Final    Turbidity UA 04/18/2019 CLOUDY* CLEAR Final    Glucose, Ur 04/18/2019 NEGATIVE  NEGATIVE Final    Bilirubin Urine 04/18/2019 NEGATIVE  NEGATIVE Final    Ketones, Urine 04/18/2019 SMALL* NEGATIVE Final    Specific Gravity, UA 04/18/2019 1.025  1.000 - 1.030 Final    Urine Hgb 04/18/2019 LARGE* NEGATIVE Final    pH, UA 04/18/2019 5.5  5.0 - 8.0 Final    Protein, UA 04/18/2019 1+* NEGATIVE Final    Urobilinogen, Urine 04/18/2019 Normal  Normal Final    Nitrite, Urine 04/18/2019 NEGATIVE  NEGATIVE Final    Leukocyte Esterase, Urine 04/18/2019 NEGATIVE  NEGATIVE Final    Urinalysis Comments 04/18/2019 NOT REPORTED   Final    HCG(Urine) Pregnancy Test 04/18/2019 NEGATIVE  NEGATIVE Final    Comment: Specimens with hCG levels near the threshold of the test (25 mIU/mL) may give a negative or   indeterminate result. In such cases, another test should be performed with a new specimen   in 48-72 hours. If early pregnancy is suspected clinically in this setting, correlation   with quantitative serum b-hCG level is suggested.  - 04/18/2019        Final    WBC, UA 04/18/2019 2 TO 5  /HPF Final    RBC, UA 04/18/2019 50   /HPF Final    Casts UA 04/18/2019 NOT REPORTED  /LPF Final    Crystals UA 04/18/2019 NOT REPORTED  None /HPF Final    Epithelial Cells UA 04/18/2019 5 TO 10  /HPF Final    Renal Epithelial, Urine 04/18/2019 NOT REPORTED  0 /HPF Final    Bacteria, UA 04/18/2019 FEW* None Final    Mucus, UA 04/18/2019 1+* None Final    Trichomonas, UA 04/18/2019 NOT REPORTED  None Final    Amorphous, UA 04/18/2019 NOT REPORTED  None Final    Other Observations UA 04/18/2019 NOT REPORTED  NOT REQ.  Final    Yeast, UA 04/18/2019 NOT REPORTED  None Final    WBC 04/18/2019 04/18/2019 22  5 - 33 U/L Final    AST 04/18/2019 20  <32 U/L Final    Total Bilirubin 04/18/2019 0.53  0.3 - 1.2 mg/dL Final    Total Protein 04/18/2019 7.4  6.4 - 8.3 g/dL Final    Alb 04/18/2019 4.4  3.5 - 5.2 g/dL Final    Albumin/Globulin Ratio 04/18/2019 NOT REPORTED  1.0 - 2.5 Final    GFR Non- 04/18/2019 >60  >60 mL/min Final    GFR  04/18/2019 >60  >60 mL/min Final    GFR Comment 04/18/2019        Final    Comment: Average GFR for 38-51 years old:   80 mL/min/1.73sq m  Chronic Kidney Disease:   <60 mL/min/1.73sq m  Kidney failure:   <15 mL/min/1.73sq m              eGFR calculated using average adult body mass.  Additional eGFR calculator available at:        [email or web address]            GFR Staging 04/18/2019 NOT REPORTED   Final    Lipase 04/18/2019 26  13 - 60 U/L Final         Most recent labs reviewed:     Lab Results   Component Value Date    WBC 11.5 (H) 04/18/2019    HGB 14.1 04/18/2019    HCT 42.3 04/18/2019    MCV 85.4 04/18/2019     04/18/2019       @BRIEFLAB(NA,K,CL,CO2,BUN,CREATININE,GLUCOSE,CALCIUM)@     Lab Results   Component Value Date    ALT 22 04/18/2019    AST 20 04/18/2019    ALKPHOS 91 04/18/2019    BILITOT 0.53 04/18/2019       Lab Results   Component Value Date    TSH 0.67 01/12/2018       Lab Results   Component Value Date    CHOL 182 01/12/2018    CHOL 202 (H) 12/16/2016     Lab Results   Component Value Date    TRIG 138 01/12/2018    TRIG 162 (H) 12/16/2016     Lab Results   Component Value Date    HDL 52 01/12/2018    HDL 60 12/16/2016     Lab Results   Component Value Date    LDLCHOLESTEROL 102 01/12/2018    LDLCHOLESTEROL 110 12/16/2016     Lab Results   Component Value Date    VLDL NOT REPORTED 01/12/2018    VLDL NOT REPORTED 12/16/2016     Lab Results   Component Value Date    CHOLHDLRATIO 3.5 01/12/2018    CHOLHDLRATIO 3.4 12/16/2016       Lab Results   Component Value Date    LABA1C 4.7 04/22/2019 Lab Results   Component Value Date    KAIKUXJL14 652 03/20/2018       Lab Results   Component Value Date    FOLATE >20.0 03/20/2018       Lab Results   Component Value Date    IRON 96 12/16/2016    TIBC 347 12/16/2016    FERRITIN 33 12/16/2016       Lab Results   Component Value Date    VITD25 55.5 03/20/2018             Current Outpatient Medications   Medication Sig Dispense Refill    FLUoxetine (PROZAC) 20 MG capsule Take 1 capsule by mouth daily 30 capsule 0    ibuprofen (ADVIL;MOTRIN) 200 MG tablet Take 800 mg by mouth      Loratadine (CLARITIN) 10 MG CAPS Take 10 mg by mouth daily        No current facility-administered medications for this visit.               Social History     Socioeconomic History    Marital status:      Spouse name: Not on file    Number of children: Not on file    Years of education: Not on file    Highest education level: Not on file   Occupational History    Not on file   Social Needs    Financial resource strain: Not on file    Food insecurity:     Worry: Not on file     Inability: Not on file    Transportation needs:     Medical: Not on file     Non-medical: Not on file   Tobacco Use    Smoking status: Never Smoker    Smokeless tobacco: Never Used   Substance and Sexual Activity    Alcohol use: Yes     Comment: social    Drug use: No     Comment: + THC states she had one marijuna cookie    Sexual activity: Yes     Partners: Male     Birth control/protection: Surgical     Comment: TL   Lifestyle    Physical activity:     Days per week: Not on file     Minutes per session: Not on file    Stress: Not on file   Relationships    Social connections:     Talks on phone: Not on file     Gets together: Not on file     Attends [employer] service: Not on file     Active member of club or organization: Not on file     Attends meetings of clubs or organizations: Not on file     Relationship status: Not on file    Intimate partner violence:     Fear of current or ex and suicidal ideas. The patient is not nervous/anxious and is not hyperactive. Physical Exam    PHYSICAL EXAM:   VITALS:   Vitals:    04/22/19 1119   BP: (!) 162/103   Pulse:    SpO2:      GENERAL:  Patient is a well-developed, well-nourished female  in no acute distress, alert and oriented x3, appropriate and pleasant conversation. HEAD: Normocephalic, atraumatic. EYES: Pupils equal, round and reactive to light and accommodation, extraocular   movements intact. ENT: Moist mucous membranes. No erythema is noted. NECK: Supple. No masses. No lymphadenopathy. CARDIOVASCULAR: Regular rate and rhythm. PULMONARY: Lungs are clear to auscultation bilaterally. ABDOMEN: Soft, nontender, nondistended. Positive bowel sounds. Tender at right flank  MUSCULOSKELETAL: Strength 5/5 bilaterally in all extremities. No tenderness to   palpation of the ribs, long bones, or spine. NEUROLOGIC: Cranial nerves II through XII grossly intact. No focal deficits are noted. ASSESSMENT AND PLAN      1. Menstrual abnormality  Will recheck ultrasound of pelvis  - US NON OB TRANSVAGINAL; Future    2. Renal disease  Keep appointment for CT abdomen, will get records from urologist    3. Flank pain  Discussed with patient take NSAIDs and Percocet as needed. Recently refilled    4. Hematuria, unspecified type  Will recheck UA to confirm about hematuria  - Urinalysis With Microscopic; Future    5. Hypertension, unspecified type  Blood pressure has been normal in the past, will do notice visit      7.  Hyperglycemia  Normal A1c  - POCT glycosylated hemoglobin (Hb A1C)      Orders Placed This Encounter   Procedures    US NON OB TRANSVAGINAL     Standing Status:   Future     Standing Expiration Date:   4/22/2020     Order Specific Question:   Reason for exam:     Answer:   heavy menstrual periods    Urinalysis With Microscopic     Standing Status:   Future     Number of Occurrences:   1     Standing Expiration Date: 4/22/2020     Order Specific Question:   SPECIFY(EX-CATH,MIDSTREAM,CYSTO,ETC)? Answer:   midstream    POCT glycosylated hemoglobin (Hb A1C)         There are no discontinued medications. Sushma received counseling on the following healthy behaviors: nutrition, exercise, medication adherence and tobacco cessation  Reviewed prior labs and health maintenance  Continue current medications, diet and exercise. Discussed use, benefit, and side effects of prescribed medications. Barriers to medication compliance addressed. Patient given educational materials - see patient instructions  Was a self-tracking handout given in paper form or via [employer]? Yes    Requested Prescriptions      No prescriptions requested or ordered in this encounter       All patient questions answered. Patient voiced understanding. Quality Measures    Body mass index is 35.32 kg/m². Elevated. Weight control planned discussed Healthy diet and regular exercise. BP: (!) 162/103 Blood pressure is high. Treatment plan consists of Weight Reduction, DASH Eating Plan, Dietary Sodium Restriction, Increased Physical Activity, Moderation in Alcohol Consumption, Avoid Tobacco and Second-hand Smoke, Patient In-home Blood Pressure Monitoring and No treatment change needed. Lab Results   Component Value Date    LDLCHOLESTEROL 102 01/12/2018    (goal LDL reduction with dx if diabetes is 50% LDL reduction)      PHQ Scores 4/22/2019 3/6/2018 1/16/2018 9/23/2014   PHQ2 Score 4 3 6 6   PHQ9 Score 19 11 18 23     Interpretation of Total Score Depression Severity: 1-4 = Minimal depression, 5-9 = Mild depression, 10-14 = Moderate depression, 15-19 = Moderately severe depression, 20-27 = Severe depression    The patient'spast medical, surgical, social, and family history as well as her   current medications and allergies were reviewed as documented in today's encounter.       Medications, labs, diagnostic studies, consultations andfollow-up as documented in this encounter. Return in about 2 months (around 6/22/2019) for flank pain . Patient wasseen with total face to face time of 25 minutes. More than 50% of this visit was counseling and education. Future Appointments   Date Time Provider Dom Trejo   7/16/2019 11:00 AM Leslie Groves MD [facility]     This note was completed by using the assistance of a speech-recognition program. However, inadvertent computerized transcription errors may be present. Althoughevery effort was made to ensure accuracy, no guarantees can be provided that every mistake has been identified and corrected by editing.   Electronically signed by Abdiel Kaufman MD on 4/22/2019  1:22 PM

## 2019-04-23 ENCOUNTER — TELEPHONE (OUTPATIENT)
Dept: FAMILY MEDICINE CLINIC | Age: 49
End: 2019-04-23

## 2019-04-23 DIAGNOSIS — N30.00 ACUTE CYSTITIS WITHOUT HEMATURIA: Primary | ICD-10-CM

## 2019-04-23 RX ORDER — NITROFURANTOIN 25; 75 MG/1; MG/1
100 CAPSULE ORAL 2 TIMES DAILY
Qty: 10 CAPSULE | Refills: 0 | Status: SHIPPED | OUTPATIENT
Start: 2019-04-23 | End: 2019-11-21 | Stop reason: ALTCHOICE

## 2019-04-23 NOTE — TELEPHONE ENCOUNTER
There was no blood in the urine, pt seen by Dr. Elizabeth Burris. I don't see urine culture , but yes she might have UTI    Please let the patient know to  prescription from pharmacy. Requested Prescriptions     Signed Prescriptions Disp Refills    nitrofurantoin, macrocrystal-monohydrate, (MACROBID) 100 MG capsule 10 capsule 0     Sig: Take 1 capsule by mouth 2 times daily     Authorizing Provider: Zoie  [address] [place] [address], [address] [place] [address]  [address] [facility] [address]  Phone: [phone] Fax: [phone]      Thank you! FYI    Future Appointments   Date Time Provider Dom Trejo   4/24/2019  7:30 AM [facility] ULTRASOUND  [facility] Radiolog   5/1/2019 10:00 AM SCHEDULE, [facility] fp sc [facility]   7/16/2019 11:00 AM Leslie Groves MD [facility]       Controlled Substances Monitoring: All Reviewers List     Tiki Robertson MA on 4/23/2019 13:35   Abdiel Kaufman MD on 4/23/2019 12:45   Result Notes for Urinalysis With Microscopic     Notes recorded by Tiki Robertson MA on 4/23/2019 at 1:35 PM EDT  Called and informed the pt. No further questions asked. ------    Notes recorded by Abdiel Kaufman MD on 4/23/2019 at 12:45 PM EDT  Notify pt, blood in urine has improved, urine is showing wbc which has increased,she may need vaginal exam to look for vaginal infection and also repeat urine test at next cande.

## 2019-04-24 ENCOUNTER — HOSPITAL ENCOUNTER (OUTPATIENT)
Dept: ULTRASOUND IMAGING | Age: 49
Discharge: HOME | End: 2019-04-26
Payer: MEDICARE

## 2019-04-24 DIAGNOSIS — D25.1 INTRAMURAL LEIOMYOMA OF UTERUS: Primary | ICD-10-CM

## 2019-04-24 DIAGNOSIS — N92.6 MENSTRUAL ABNORMALITY: ICD-10-CM

## 2019-04-24 PROCEDURE — 76830 TRANSVAGINAL US NON-OB: CPT

## 2019-11-21 ENCOUNTER — OFFICE VISIT (OUTPATIENT)
Dept: FAMILY MEDICINE CLINIC | Age: 49
End: 2019-11-21
Payer: MEDICARE

## 2019-11-21 VITALS
BODY MASS INDEX: 34.38 KG/M2 | OXYGEN SATURATION: 95 % | HEIGHT: 63 IN | DIASTOLIC BLOOD PRESSURE: 107 MMHG | HEART RATE: 88 BPM | SYSTOLIC BLOOD PRESSURE: 165 MMHG | WEIGHT: 194 LBS

## 2019-11-21 DIAGNOSIS — F33.1 MODERATE EPISODE OF RECURRENT MAJOR DEPRESSIVE DISORDER (HCC): ICD-10-CM

## 2019-11-21 DIAGNOSIS — Z13.220 SCREENING CHOLESTEROL LEVEL: ICD-10-CM

## 2019-11-21 DIAGNOSIS — D25.1 INTRAMURAL LEIOMYOMA OF UTERUS: Primary | ICD-10-CM

## 2019-11-21 DIAGNOSIS — Z23 NEED FOR VACCINATION: ICD-10-CM

## 2019-11-21 DIAGNOSIS — M21.372 BILATERAL FOOT-DROP: ICD-10-CM

## 2019-11-21 DIAGNOSIS — G60.0 CHARCOT MARIE TOOTH MUSCULAR ATROPHY: Chronic | ICD-10-CM

## 2019-11-21 DIAGNOSIS — I10 ESSENTIAL HYPERTENSION: ICD-10-CM

## 2019-11-21 DIAGNOSIS — M21.371 BILATERAL FOOT-DROP: ICD-10-CM

## 2019-11-21 PROBLEM — R10.9 FLANK PAIN: Status: RESOLVED | Noted: 2019-03-19 | Resolved: 2019-11-21

## 2019-11-21 PROBLEM — N30.01 ACUTE CYSTITIS WITH HEMATURIA: Status: RESOLVED | Noted: 2019-03-19 | Resolved: 2019-11-21

## 2019-11-21 PROBLEM — R31.9 HEMATURIA: Status: RESOLVED | Noted: 2019-04-22 | Resolved: 2019-11-21

## 2019-11-21 PROBLEM — D25.0 SUBMUCOUS LEIOMYOMA OF UTERUS: Status: ACTIVE | Noted: 2019-11-21

## 2019-11-21 PROCEDURE — G8417 CALC BMI ABV UP PARAM F/U: HCPCS | Performed by: FAMILY MEDICINE

## 2019-11-21 PROCEDURE — 99214 OFFICE O/P EST MOD 30 MIN: CPT | Performed by: FAMILY MEDICINE

## 2019-11-21 PROCEDURE — G8427 DOCREV CUR MEDS BY ELIG CLIN: HCPCS | Performed by: FAMILY MEDICINE

## 2019-11-21 PROCEDURE — G8482 FLU IMMUNIZE ORDER/ADMIN: HCPCS | Performed by: FAMILY MEDICINE

## 2019-11-21 PROCEDURE — 90686 IIV4 VACC NO PRSV 0.5 ML IM: CPT | Performed by: FAMILY MEDICINE

## 2019-11-21 PROCEDURE — 1036F TOBACCO NON-USER: CPT | Performed by: FAMILY MEDICINE

## 2019-11-21 PROCEDURE — G0008 ADMIN INFLUENZA VIRUS VAC: HCPCS | Performed by: FAMILY MEDICINE

## 2019-11-21 RX ORDER — LISINOPRIL 10 MG/1
10 TABLET ORAL DAILY
Qty: 60 TABLET | Refills: 2 | Status: SHIPPED | OUTPATIENT
Start: 2019-11-21 | End: 2020-01-02 | Stop reason: SDUPTHER

## 2019-11-21 RX ORDER — BLOOD PRESSURE TEST KIT
KIT MISCELLANEOUS
Qty: 1 KIT | Refills: 0 | Status: SHIPPED | OUTPATIENT
Start: 2019-11-21 | End: 2019-12-05 | Stop reason: SDUPTHER

## 2019-11-21 ASSESSMENT — ENCOUNTER SYMPTOMS
SINUS PRESSURE: 0
SHORTNESS OF BREATH: 0
ABDOMINAL PAIN: 0
RHINORRHEA: 0
BACK PAIN: 1
WHEEZING: 0
CONSTIPATION: 0
ABDOMINAL DISTENTION: 0
BLOOD IN STOOL: 0

## 2019-11-21 ASSESSMENT — PATIENT HEALTH QUESTIONNAIRE - PHQ9
SUM OF ALL RESPONSES TO PHQ QUESTIONS 1-9: 0
SUM OF ALL RESPONSES TO PHQ9 QUESTIONS 1 & 2: 0
2. FEELING DOWN, DEPRESSED OR HOPELESS: 0
SUM OF ALL RESPONSES TO PHQ QUESTIONS 1-9: 0
1. LITTLE INTEREST OR PLEASURE IN DOING THINGS: 0

## 2019-12-01 ENCOUNTER — APPOINTMENT (OUTPATIENT)
Dept: GENERAL RADIOLOGY | Age: 49
End: 2019-12-01
Payer: MEDICARE

## 2019-12-01 ENCOUNTER — HOSPITAL ENCOUNTER (OUTPATIENT)
Age: 49
Setting detail: OBSERVATION
Discharge: HOME | End: 2019-12-03
Attending: EMERGENCY MEDICINE | Admitting: EMERGENCY MEDICINE
Payer: MEDICARE

## 2019-12-01 DIAGNOSIS — R07.9 CHEST PAIN, UNSPECIFIED TYPE: Primary | ICD-10-CM

## 2019-12-01 DIAGNOSIS — I10 HYPERTENSION, UNSPECIFIED TYPE: ICD-10-CM

## 2019-12-01 LAB
ABSOLUTE EOS #: 0.46 K/UL (ref 0–0.44)
ABSOLUTE IMMATURE GRANULOCYTE: 0.08 K/UL (ref 0–0.3)
ABSOLUTE LYMPH #: 1.72 K/UL (ref 1.1–3.7)
ABSOLUTE MONO #: 0.6 K/UL (ref 0.1–1.2)
ANION GAP SERPL CALCULATED.3IONS-SCNC: 12 MMOL/L (ref 9–17)
BASOPHILS # BLD: 1 % (ref 0–2)
BASOPHILS ABSOLUTE: 0.1 K/UL (ref 0–0.2)
BUN BLDV-MCNC: 13 MG/DL (ref 6–20)
BUN/CREAT BLD: NORMAL (ref 9–20)
CALCIUM SERPL-MCNC: 9.6 MG/DL (ref 8.6–10.4)
CHLORIDE BLD-SCNC: 104 MMOL/L (ref 98–107)
CO2: 24 MMOL/L (ref 20–31)
CREAT SERPL-MCNC: 0.66 MG/DL (ref 0.5–0.9)
DIFFERENTIAL TYPE: ABNORMAL
EOSINOPHILS RELATIVE PERCENT: 6 % (ref 1–4)
GFR AFRICAN AMERICAN: >60 ML/MIN
GFR NON-AFRICAN AMERICAN: >60 ML/MIN
GFR SERPL CREATININE-BSD FRML MDRD: NORMAL ML/MIN/{1.73_M2}
GFR SERPL CREATININE-BSD FRML MDRD: NORMAL ML/MIN/{1.73_M2}
GLUCOSE BLD-MCNC: 88 MG/DL (ref 70–99)
HCT VFR BLD CALC: 44.2 % (ref 36.3–47.1)
HEMOGLOBIN: 14.5 G/DL (ref 11.9–15.1)
IMMATURE GRANULOCYTES: 1 %
LYMPHOCYTES # BLD: 24 % (ref 24–43)
MCH RBC QN AUTO: 28.8 PG (ref 25.2–33.5)
MCHC RBC AUTO-ENTMCNC: 32.8 G/DL (ref 28.4–34.8)
MCV RBC AUTO: 87.9 FL (ref 82.6–102.9)
MONOCYTES # BLD: 8 % (ref 3–12)
NRBC AUTOMATED: 0 PER 100 WBC
PDW BLD-RTO: 13.3 % (ref 11.8–14.4)
PLATELET # BLD: 220 K/UL (ref 138–453)
PLATELET ESTIMATE: ABNORMAL
PMV BLD AUTO: 9.9 FL (ref 8.1–13.5)
POTASSIUM SERPL-SCNC: 4.4 MMOL/L (ref 3.7–5.3)
RBC # BLD: 5.03 M/UL (ref 3.95–5.11)
RBC # BLD: ABNORMAL 10*6/UL
SEG NEUTROPHILS: 60 % (ref 36–65)
SEGMENTED NEUTROPHILS ABSOLUTE COUNT: 4.35 K/UL (ref 1.5–8.1)
SODIUM BLD-SCNC: 140 MMOL/L (ref 135–144)
TROPONIN INTERP: ABNORMAL
TROPONIN INTERP: NORMAL
TROPONIN T: ABNORMAL NG/ML
TROPONIN T: NORMAL NG/ML
TROPONIN, HIGH SENSITIVITY: 14 NG/L (ref 0–14)
TROPONIN, HIGH SENSITIVITY: 15 NG/L (ref 0–14)
WBC # BLD: 7.3 K/UL (ref 3.5–11.3)
WBC # BLD: ABNORMAL 10*3/UL

## 2019-12-01 PROCEDURE — 99285 EMERGENCY DEPT VISIT HI MDM: CPT

## 2019-12-01 PROCEDURE — 84484 ASSAY OF TROPONIN QUANT: CPT

## 2019-12-01 PROCEDURE — 2500000003 HC RX 250 WO HCPCS: Performed by: STUDENT IN AN ORGANIZED HEALTH CARE EDUCATION/TRAINING PROGRAM

## 2019-12-01 PROCEDURE — 71046 X-RAY EXAM CHEST 2 VIEWS: CPT

## 2019-12-01 PROCEDURE — 85025 COMPLETE CBC W/AUTO DIFF WBC: CPT

## 2019-12-01 PROCEDURE — 96374 THER/PROPH/DIAG INJ IV PUSH: CPT

## 2019-12-01 PROCEDURE — G0378 HOSPITAL OBSERVATION PER HR: HCPCS

## 2019-12-01 PROCEDURE — 93005 ELECTROCARDIOGRAM TRACING: CPT | Performed by: STUDENT IN AN ORGANIZED HEALTH CARE EDUCATION/TRAINING PROGRAM

## 2019-12-01 PROCEDURE — 80048 BASIC METABOLIC PNL TOTAL CA: CPT

## 2019-12-01 PROCEDURE — 2580000003 HC RX 258: Performed by: STUDENT IN AN ORGANIZED HEALTH CARE EDUCATION/TRAINING PROGRAM

## 2019-12-01 PROCEDURE — 6370000000 HC RX 637 (ALT 250 FOR IP): Performed by: STUDENT IN AN ORGANIZED HEALTH CARE EDUCATION/TRAINING PROGRAM

## 2019-12-01 RX ORDER — HYDRALAZINE HYDROCHLORIDE 20 MG/ML
10 INJECTION INTRAMUSCULAR; INTRAVENOUS EVERY 6 HOURS PRN
Status: DISCONTINUED | OUTPATIENT
Start: 2019-12-01 | End: 2019-12-03 | Stop reason: HOSPADM

## 2019-12-01 RX ORDER — LABETALOL 20 MG/4 ML (5 MG/ML) INTRAVENOUS SYRINGE
10 EVERY 6 HOURS PRN
Status: DISCONTINUED | OUTPATIENT
Start: 2019-12-01 | End: 2019-12-03 | Stop reason: HOSPADM

## 2019-12-01 RX ORDER — ONDANSETRON 2 MG/ML
4 INJECTION INTRAMUSCULAR; INTRAVENOUS EVERY 6 HOURS PRN
Status: DISCONTINUED | OUTPATIENT
Start: 2019-12-01 | End: 2019-12-03 | Stop reason: HOSPADM

## 2019-12-01 RX ORDER — LISINOPRIL 10 MG/1
10 TABLET ORAL DAILY
Status: DISCONTINUED | OUTPATIENT
Start: 2019-12-02 | End: 2019-12-02

## 2019-12-01 RX ORDER — SODIUM CHLORIDE 0.9 % (FLUSH) 0.9 %
10 SYRINGE (ML) INJECTION PRN
Status: DISCONTINUED | OUTPATIENT
Start: 2019-12-01 | End: 2019-12-03 | Stop reason: HOSPADM

## 2019-12-01 RX ORDER — ASPIRIN 81 MG/1
324 TABLET, CHEWABLE ORAL ONCE
Status: COMPLETED | OUTPATIENT
Start: 2019-12-01 | End: 2019-12-01

## 2019-12-01 RX ORDER — LABETALOL HYDROCHLORIDE 5 MG/ML
20 INJECTION, SOLUTION INTRAVENOUS ONCE
Status: COMPLETED | OUTPATIENT
Start: 2019-12-01 | End: 2019-12-01

## 2019-12-01 RX ORDER — SODIUM CHLORIDE 0.9 % (FLUSH) 0.9 %
10 SYRINGE (ML) INJECTION EVERY 12 HOURS SCHEDULED
Status: DISCONTINUED | OUTPATIENT
Start: 2019-12-01 | End: 2019-12-03 | Stop reason: HOSPADM

## 2019-12-01 RX ADMIN — Medication 20 MG: at 20:29

## 2019-12-01 RX ADMIN — SODIUM CHLORIDE, PRESERVATIVE FREE 10 ML: 5 INJECTION INTRAVENOUS at 22:57

## 2019-12-01 RX ADMIN — ASPIRIN 81 MG 324 MG: 81 TABLET ORAL at 18:53

## 2019-12-01 ASSESSMENT — PAIN DESCRIPTION - PAIN TYPE
TYPE: ACUTE PAIN
TYPE: ACUTE PAIN

## 2019-12-01 ASSESSMENT — ENCOUNTER SYMPTOMS
COUGH: 0
SORE THROAT: 0
VOMITING: 0
STRIDOR: 0
NAUSEA: 0
SHORTNESS OF BREATH: 0
BACK PAIN: 0
ABDOMINAL PAIN: 0
PHOTOPHOBIA: 0
RHINORRHEA: 0

## 2019-12-01 ASSESSMENT — PAIN DESCRIPTION - LOCATION
LOCATION: CHEST
LOCATION: CHEST

## 2019-12-01 ASSESSMENT — PAIN SCALES - GENERAL
PAINLEVEL_OUTOF10: 7
PAINLEVEL_OUTOF10: 7

## 2019-12-01 ASSESSMENT — PAIN DESCRIPTION - DESCRIPTORS
DESCRIPTORS: TIGHTNESS
DESCRIPTORS: TIGHTNESS

## 2019-12-01 ASSESSMENT — PAIN DESCRIPTION - ORIENTATION
ORIENTATION: RIGHT
ORIENTATION: RIGHT

## 2019-12-02 ENCOUNTER — APPOINTMENT (OUTPATIENT)
Dept: NUCLEAR MEDICINE | Age: 49
End: 2019-12-02
Payer: MEDICARE

## 2019-12-02 LAB
ABSOLUTE EOS #: 0.48 K/UL (ref 0–0.44)
ABSOLUTE IMMATURE GRANULOCYTE: 0.03 K/UL (ref 0–0.3)
ABSOLUTE LYMPH #: 1.67 K/UL (ref 1.1–3.7)
ABSOLUTE MONO #: 0.61 K/UL (ref 0.1–1.2)
ANION GAP SERPL CALCULATED.3IONS-SCNC: 13 MMOL/L (ref 9–17)
BASOPHILS # BLD: 1 % (ref 0–2)
BASOPHILS ABSOLUTE: 0.1 K/UL (ref 0–0.2)
BUN BLDV-MCNC: 13 MG/DL (ref 6–20)
BUN/CREAT BLD: ABNORMAL (ref 9–20)
CALCIUM SERPL-MCNC: 9.2 MG/DL (ref 8.6–10.4)
CHLORIDE BLD-SCNC: 105 MMOL/L (ref 98–107)
CO2: 21 MMOL/L (ref 20–31)
CREAT SERPL-MCNC: 0.54 MG/DL (ref 0.5–0.9)
DIFFERENTIAL TYPE: ABNORMAL
EKG ATRIAL RATE: 81 BPM
EKG ATRIAL RATE: 98 BPM
EKG P AXIS: 17 DEGREES
EKG P AXIS: 56 DEGREES
EKG P-R INTERVAL: 148 MS
EKG P-R INTERVAL: 166 MS
EKG Q-T INTERVAL: 348 MS
EKG Q-T INTERVAL: 382 MS
EKG QRS DURATION: 74 MS
EKG QRS DURATION: 76 MS
EKG QTC CALCULATION (BAZETT): 443 MS
EKG QTC CALCULATION (BAZETT): 444 MS
EKG R AXIS: -17 DEGREES
EKG R AXIS: -18 DEGREES
EKG T AXIS: 29 DEGREES
EKG T AXIS: 4 DEGREES
EKG VENTRICULAR RATE: 81 BPM
EKG VENTRICULAR RATE: 98 BPM
EOSINOPHILS RELATIVE PERCENT: 7 % (ref 1–4)
GFR AFRICAN AMERICAN: >60 ML/MIN
GFR NON-AFRICAN AMERICAN: >60 ML/MIN
GFR SERPL CREATININE-BSD FRML MDRD: ABNORMAL ML/MIN/{1.73_M2}
GFR SERPL CREATININE-BSD FRML MDRD: ABNORMAL ML/MIN/{1.73_M2}
GLUCOSE BLD-MCNC: 102 MG/DL (ref 70–99)
HCG QUALITATIVE: NEGATIVE
HCT VFR BLD CALC: 41.7 % (ref 36.3–47.1)
HEMOGLOBIN: 13.3 G/DL (ref 11.9–15.1)
IMMATURE GRANULOCYTES: 0 %
LV EF: 58 %
LV EF: 62 %
LVEF MODALITY: NORMAL
LVEF MODALITY: NORMAL
LYMPHOCYTES # BLD: 23 % (ref 24–43)
MCH RBC QN AUTO: 28.9 PG (ref 25.2–33.5)
MCHC RBC AUTO-ENTMCNC: 31.9 G/DL (ref 28.4–34.8)
MCV RBC AUTO: 90.5 FL (ref 82.6–102.9)
MONOCYTES # BLD: 8 % (ref 3–12)
NRBC AUTOMATED: 0 PER 100 WBC
PDW BLD-RTO: 13.5 % (ref 11.8–14.4)
PLATELET # BLD: 228 K/UL (ref 138–453)
PLATELET ESTIMATE: ABNORMAL
PMV BLD AUTO: 10.3 FL (ref 8.1–13.5)
POTASSIUM SERPL-SCNC: 3.9 MMOL/L (ref 3.7–5.3)
RBC # BLD: 4.61 M/UL (ref 3.95–5.11)
RBC # BLD: ABNORMAL 10*6/UL
SEG NEUTROPHILS: 61 % (ref 36–65)
SEGMENTED NEUTROPHILS ABSOLUTE COUNT: 4.34 K/UL (ref 1.5–8.1)
SODIUM BLD-SCNC: 139 MMOL/L (ref 135–144)
TROPONIN INTERP: NORMAL
TROPONIN INTERP: NORMAL
TROPONIN T: NORMAL NG/ML
TROPONIN T: NORMAL NG/ML
TROPONIN, HIGH SENSITIVITY: 8 NG/L (ref 0–14)
TROPONIN, HIGH SENSITIVITY: 9 NG/L (ref 0–14)
WBC # BLD: 7.2 K/UL (ref 3.5–11.3)
WBC # BLD: ABNORMAL 10*3/UL

## 2019-12-02 PROCEDURE — 84484 ASSAY OF TROPONIN QUANT: CPT

## 2019-12-02 PROCEDURE — 93306 TTE W/DOPPLER COMPLETE: CPT

## 2019-12-02 PROCEDURE — 80048 BASIC METABOLIC PNL TOTAL CA: CPT

## 2019-12-02 PROCEDURE — A9500 TC99M SESTAMIBI: HCPCS | Performed by: INTERNAL MEDICINE

## 2019-12-02 PROCEDURE — 2580000003 HC RX 258: Performed by: INTERNAL MEDICINE

## 2019-12-02 PROCEDURE — 6370000000 HC RX 637 (ALT 250 FOR IP): Performed by: STUDENT IN AN ORGANIZED HEALTH CARE EDUCATION/TRAINING PROGRAM

## 2019-12-02 PROCEDURE — 93005 ELECTROCARDIOGRAM TRACING: CPT | Performed by: STUDENT IN AN ORGANIZED HEALTH CARE EDUCATION/TRAINING PROGRAM

## 2019-12-02 PROCEDURE — 36415 COLL VENOUS BLD VENIPUNCTURE: CPT

## 2019-12-02 PROCEDURE — 6370000000 HC RX 637 (ALT 250 FOR IP): Performed by: INTERNAL MEDICINE

## 2019-12-02 PROCEDURE — 96372 THER/PROPH/DIAG INJ SC/IM: CPT

## 2019-12-02 PROCEDURE — 78452 HT MUSCLE IMAGE SPECT MULT: CPT

## 2019-12-02 PROCEDURE — 2580000003 HC RX 258: Performed by: STUDENT IN AN ORGANIZED HEALTH CARE EDUCATION/TRAINING PROGRAM

## 2019-12-02 PROCEDURE — 85025 COMPLETE CBC W/AUTO DIFF WBC: CPT

## 2019-12-02 PROCEDURE — 3430000000 HC RX DIAGNOSTIC RADIOPHARMACEUTICAL: Performed by: INTERNAL MEDICINE

## 2019-12-02 PROCEDURE — G0378 HOSPITAL OBSERVATION PER HR: HCPCS

## 2019-12-02 PROCEDURE — 93017 CV STRESS TEST TRACING ONLY: CPT

## 2019-12-02 PROCEDURE — 6360000002 HC RX W HCPCS: Performed by: INTERNAL MEDICINE

## 2019-12-02 PROCEDURE — 6360000002 HC RX W HCPCS: Performed by: STUDENT IN AN ORGANIZED HEALTH CARE EDUCATION/TRAINING PROGRAM

## 2019-12-02 PROCEDURE — 84703 CHORIONIC GONADOTROPIN ASSAY: CPT

## 2019-12-02 RX ORDER — SODIUM CHLORIDE 0.9 % (FLUSH) 0.9 %
10 SYRINGE (ML) INJECTION PRN
Status: DISCONTINUED | OUTPATIENT
Start: 2019-12-02 | End: 2019-12-02 | Stop reason: ALTCHOICE

## 2019-12-02 RX ORDER — METOPROLOL TARTRATE 5 MG/5ML
5 INJECTION INTRAVENOUS EVERY 5 MIN PRN
Status: DISCONTINUED | OUTPATIENT
Start: 2019-12-02 | End: 2019-12-02 | Stop reason: ALTCHOICE

## 2019-12-02 RX ORDER — ACETAMINOPHEN 325 MG/1
650 TABLET ORAL EVERY 6 HOURS PRN
Status: DISCONTINUED | OUTPATIENT
Start: 2019-12-02 | End: 2019-12-03 | Stop reason: HOSPADM

## 2019-12-02 RX ORDER — NITROGLYCERIN 0.4 MG/1
0.4 TABLET SUBLINGUAL EVERY 5 MIN PRN
Status: DISCONTINUED | OUTPATIENT
Start: 2019-12-02 | End: 2019-12-02 | Stop reason: ALTCHOICE

## 2019-12-02 RX ORDER — CARVEDILOL 6.25 MG/1
6.25 TABLET ORAL 2 TIMES DAILY WITH MEALS
Status: DISCONTINUED | OUTPATIENT
Start: 2019-12-02 | End: 2019-12-03 | Stop reason: HOSPADM

## 2019-12-02 RX ORDER — LISINOPRIL 10 MG/1
10 TABLET ORAL 2 TIMES DAILY
Status: DISCONTINUED | OUTPATIENT
Start: 2019-12-02 | End: 2019-12-03

## 2019-12-02 RX ORDER — SODIUM CHLORIDE 0.9 % (FLUSH) 0.9 %
10 SYRINGE (ML) INJECTION PRN
Status: DISCONTINUED | OUTPATIENT
Start: 2019-12-02 | End: 2019-12-03 | Stop reason: HOSPADM

## 2019-12-02 RX ORDER — ALBUTEROL SULFATE 90 UG/1
2 AEROSOL, METERED RESPIRATORY (INHALATION) PRN
Status: DISCONTINUED | OUTPATIENT
Start: 2019-12-02 | End: 2019-12-02 | Stop reason: ALTCHOICE

## 2019-12-02 RX ORDER — CARVEDILOL 6.25 MG/1
6.25 TABLET ORAL 2 TIMES DAILY
Qty: 60 TABLET | Refills: 3 | Status: SHIPPED | OUTPATIENT
Start: 2019-12-02 | End: 2019-12-03 | Stop reason: SDUPTHER

## 2019-12-02 RX ORDER — SODIUM CHLORIDE 9 MG/ML
500 INJECTION, SOLUTION INTRAVENOUS CONTINUOUS PRN
Status: DISCONTINUED | OUTPATIENT
Start: 2019-12-02 | End: 2019-12-02 | Stop reason: ALTCHOICE

## 2019-12-02 RX ORDER — AMINOPHYLLINE DIHYDRATE 25 MG/ML
50 INJECTION, SOLUTION INTRAVENOUS PRN
Status: DISCONTINUED | OUTPATIENT
Start: 2019-12-02 | End: 2019-12-02 | Stop reason: ALTCHOICE

## 2019-12-02 RX ORDER — ATROPINE SULFATE 0.1 MG/ML
0.5 INJECTION INTRAVENOUS EVERY 5 MIN PRN
Status: DISCONTINUED | OUTPATIENT
Start: 2019-12-02 | End: 2019-12-02 | Stop reason: ALTCHOICE

## 2019-12-02 RX ORDER — IBUPROFEN 800 MG/1
800 TABLET ORAL ONCE
Status: COMPLETED | OUTPATIENT
Start: 2019-12-02 | End: 2019-12-02

## 2019-12-02 RX ADMIN — Medication 10 ML: at 11:38

## 2019-12-02 RX ADMIN — TETRAKIS(2-METHOXYISOBUTYLISOCYANIDE)COPPER(I) TETRAFLUOROBORATE 48.5 MILLICURIE: 1 INJECTION, POWDER, LYOPHILIZED, FOR SOLUTION INTRAVENOUS at 13:25

## 2019-12-02 RX ADMIN — SODIUM CHLORIDE, PRESERVATIVE FREE 10 ML: 5 INJECTION INTRAVENOUS at 11:49

## 2019-12-02 RX ADMIN — SODIUM CHLORIDE, PRESERVATIVE FREE 10 ML: 5 INJECTION INTRAVENOUS at 09:00

## 2019-12-02 RX ADMIN — CARVEDILOL 6.25 MG: 6.25 TABLET, FILM COATED ORAL at 15:08

## 2019-12-02 RX ADMIN — SODIUM CHLORIDE, PRESERVATIVE FREE 10 ML: 5 INJECTION INTRAVENOUS at 01:25

## 2019-12-02 RX ADMIN — ACETAMINOPHEN 650 MG: 325 TABLET ORAL at 15:07

## 2019-12-02 RX ADMIN — IBUPROFEN 800 MG: 800 TABLET, FILM COATED ORAL at 20:43

## 2019-12-02 RX ADMIN — ACETAMINOPHEN 650 MG: 325 TABLET ORAL at 02:22

## 2019-12-02 RX ADMIN — ACETAMINOPHEN 650 MG: 325 TABLET ORAL at 08:19

## 2019-12-02 RX ADMIN — TETRAKIS(2-METHOXYISOBUTYLISOCYANIDE)COPPER(I) TETRAFLUOROBORATE 14.3 MILLICURIE: 1 INJECTION, POWDER, LYOPHILIZED, FOR SOLUTION INTRAVENOUS at 11:49

## 2019-12-02 RX ADMIN — ENOXAPARIN SODIUM 40 MG: 40 INJECTION SUBCUTANEOUS at 08:19

## 2019-12-02 RX ADMIN — SODIUM CHLORIDE, PRESERVATIVE FREE 10 ML: 5 INJECTION INTRAVENOUS at 20:44

## 2019-12-02 RX ADMIN — REGADENOSON 0.4 MG: 0.08 INJECTION, SOLUTION INTRAVENOUS at 11:49

## 2019-12-02 RX ADMIN — LISINOPRIL 10 MG: 10 TABLET ORAL at 08:19

## 2019-12-02 RX ADMIN — CARVEDILOL 6.25 MG: 6.25 TABLET, FILM COATED ORAL at 20:43

## 2019-12-02 ASSESSMENT — PAIN DESCRIPTION - LOCATION
LOCATION: CHEST
LOCATION: HEAD
LOCATION: HEAD

## 2019-12-02 ASSESSMENT — PAIN DESCRIPTION - DESCRIPTORS
DESCRIPTORS: TIGHTNESS
DESCRIPTORS: HEADACHE
DESCRIPTORS: HEADACHE

## 2019-12-02 ASSESSMENT — PAIN SCALES - GENERAL
PAINLEVEL_OUTOF10: 7
PAINLEVEL_OUTOF10: 7
PAINLEVEL_OUTOF10: 6
PAINLEVEL_OUTOF10: 7
PAINLEVEL_OUTOF10: 7

## 2019-12-02 ASSESSMENT — PAIN DESCRIPTION - PAIN TYPE
TYPE: ACUTE PAIN
TYPE: ACUTE PAIN

## 2019-12-02 ASSESSMENT — PAIN DESCRIPTION - ORIENTATION: ORIENTATION: RIGHT

## 2019-12-03 ENCOUNTER — APPOINTMENT (OUTPATIENT)
Dept: CARDIAC CATH/INVASIVE PROCEDURES | Age: 49
End: 2019-12-03
Payer: MEDICARE

## 2019-12-03 VITALS
DIASTOLIC BLOOD PRESSURE: 74 MMHG | HEART RATE: 78 BPM | SYSTOLIC BLOOD PRESSURE: 121 MMHG | OXYGEN SATURATION: 96 % | RESPIRATION RATE: 18 BRPM | TEMPERATURE: 98.2 F | HEIGHT: 63 IN | WEIGHT: 190 LBS | BODY MASS INDEX: 33.66 KG/M2

## 2019-12-03 PROCEDURE — 6360000002 HC RX W HCPCS: Performed by: STUDENT IN AN ORGANIZED HEALTH CARE EDUCATION/TRAINING PROGRAM

## 2019-12-03 PROCEDURE — 2580000003 HC RX 258: Performed by: STUDENT IN AN ORGANIZED HEALTH CARE EDUCATION/TRAINING PROGRAM

## 2019-12-03 PROCEDURE — C1769 GUIDE WIRE: HCPCS

## 2019-12-03 PROCEDURE — 93458 L HRT ARTERY/VENTRICLE ANGIO: CPT | Performed by: INTERNAL MEDICINE

## 2019-12-03 PROCEDURE — 2500000003 HC RX 250 WO HCPCS

## 2019-12-03 PROCEDURE — 2709999900 HC NON-CHARGEABLE SUPPLY

## 2019-12-03 PROCEDURE — 2580000003 HC RX 258: Performed by: INTERNAL MEDICINE

## 2019-12-03 PROCEDURE — 6370000000 HC RX 637 (ALT 250 FOR IP): Performed by: STUDENT IN AN ORGANIZED HEALTH CARE EDUCATION/TRAINING PROGRAM

## 2019-12-03 PROCEDURE — 6370000000 HC RX 637 (ALT 250 FOR IP): Performed by: INTERNAL MEDICINE

## 2019-12-03 PROCEDURE — 6360000004 HC RX CONTRAST MEDICATION

## 2019-12-03 PROCEDURE — 96372 THER/PROPH/DIAG INJ SC/IM: CPT

## 2019-12-03 PROCEDURE — C1760 CLOSURE DEV, VASC: HCPCS

## 2019-12-03 PROCEDURE — G0378 HOSPITAL OBSERVATION PER HR: HCPCS

## 2019-12-03 PROCEDURE — C1894 INTRO/SHEATH, NON-LASER: HCPCS

## 2019-12-03 PROCEDURE — 6360000002 HC RX W HCPCS

## 2019-12-03 RX ORDER — SODIUM CHLORIDE 0.9 % (FLUSH) 0.9 %
10 SYRINGE (ML) INJECTION PRN
Status: DISCONTINUED | OUTPATIENT
Start: 2019-12-03 | End: 2019-12-03 | Stop reason: HOSPADM

## 2019-12-03 RX ORDER — CARVEDILOL 6.25 MG/1
6.25 TABLET ORAL 2 TIMES DAILY
Qty: 60 TABLET | Refills: 3 | Status: SHIPPED | OUTPATIENT
Start: 2019-12-03 | End: 2019-12-03 | Stop reason: HOSPADM

## 2019-12-03 RX ORDER — AMLODIPINE BESYLATE 5 MG/1
5 TABLET ORAL DAILY
Qty: 30 TABLET | Refills: 0 | Status: SHIPPED | OUTPATIENT
Start: 2019-12-03 | End: 2019-12-19 | Stop reason: SDUPTHER

## 2019-12-03 RX ORDER — AMLODIPINE BESYLATE 5 MG/1
5 TABLET ORAL DAILY
Status: DISCONTINUED | OUTPATIENT
Start: 2019-12-03 | End: 2019-12-03 | Stop reason: HOSPADM

## 2019-12-03 RX ORDER — LISINOPRIL 10 MG/1
10 TABLET ORAL DAILY
Status: DISCONTINUED | OUTPATIENT
Start: 2019-12-04 | End: 2019-12-03 | Stop reason: HOSPADM

## 2019-12-03 RX ORDER — SODIUM CHLORIDE 0.9 % (FLUSH) 0.9 %
10 SYRINGE (ML) INJECTION EVERY 12 HOURS SCHEDULED
Status: DISCONTINUED | OUTPATIENT
Start: 2019-12-03 | End: 2019-12-03 | Stop reason: HOSPADM

## 2019-12-03 RX ORDER — ACETAMINOPHEN 325 MG/1
650 TABLET ORAL EVERY 4 HOURS PRN
Status: DISCONTINUED | OUTPATIENT
Start: 2019-12-03 | End: 2019-12-03 | Stop reason: HOSPADM

## 2019-12-03 RX ORDER — SODIUM CHLORIDE 9 MG/ML
INJECTION, SOLUTION INTRAVENOUS CONTINUOUS
Status: DISCONTINUED | OUTPATIENT
Start: 2019-12-03 | End: 2019-12-03 | Stop reason: HOSPADM

## 2019-12-03 RX ORDER — CARVEDILOL 6.25 MG/1
6.25 TABLET ORAL 2 TIMES DAILY WITH MEALS
Qty: 60 TABLET | Refills: 3 | Status: SHIPPED | OUTPATIENT
Start: 2019-12-03 | End: 2020-01-02 | Stop reason: SDUPTHER

## 2019-12-03 RX ORDER — CARVEDILOL 6.25 MG/1
6.25 TABLET ORAL 2 TIMES DAILY WITH MEALS
Status: ON HOLD | COMMUNITY
End: 2019-12-03 | Stop reason: SDUPTHER

## 2019-12-03 RX ORDER — AMLODIPINE BESYLATE 5 MG/1
5 TABLET ORAL DAILY
Status: ON HOLD | COMMUNITY
End: 2019-12-03 | Stop reason: SDUPTHER

## 2019-12-03 RX ADMIN — SODIUM CHLORIDE, PRESERVATIVE FREE 10 ML: 5 INJECTION INTRAVENOUS at 09:00

## 2019-12-03 RX ADMIN — CARVEDILOL 6.25 MG: 6.25 TABLET, FILM COATED ORAL at 18:16

## 2019-12-03 RX ADMIN — AMLODIPINE BESYLATE 5 MG: 5 TABLET ORAL at 18:16

## 2019-12-03 RX ADMIN — SODIUM CHLORIDE: 9 INJECTION, SOLUTION INTRAVENOUS at 15:57

## 2019-12-03 RX ADMIN — CARVEDILOL 6.25 MG: 6.25 TABLET, FILM COATED ORAL at 07:51

## 2019-12-03 RX ADMIN — ENOXAPARIN SODIUM 40 MG: 40 INJECTION SUBCUTANEOUS at 07:52

## 2019-12-03 ASSESSMENT — PAIN SCALES - GENERAL: PAINLEVEL_OUTOF10: 0

## 2019-12-04 ENCOUNTER — TELEPHONE (OUTPATIENT)
Dept: FAMILY MEDICINE CLINIC | Age: 49
End: 2019-12-04

## 2019-12-05 ENCOUNTER — OFFICE VISIT (OUTPATIENT)
Dept: FAMILY MEDICINE CLINIC | Age: 49
End: 2019-12-05
Payer: MEDICARE

## 2019-12-05 ENCOUNTER — HOSPITAL ENCOUNTER (OUTPATIENT)
Dept: CT IMAGING | Age: 49
Discharge: HOME | End: 2019-12-07
Payer: MEDICARE

## 2019-12-05 ENCOUNTER — HOSPITAL ENCOUNTER (OUTPATIENT)
Age: 49
Discharge: HOME | End: 2019-12-05
Payer: MEDICARE

## 2019-12-05 ENCOUNTER — TELEPHONE (OUTPATIENT)
Dept: FAMILY MEDICINE CLINIC | Age: 49
End: 2019-12-05

## 2019-12-05 VITALS
DIASTOLIC BLOOD PRESSURE: 89 MMHG | HEIGHT: 63 IN | WEIGHT: 195 LBS | OXYGEN SATURATION: 95 % | SYSTOLIC BLOOD PRESSURE: 136 MMHG | HEART RATE: 86 BPM | BODY MASS INDEX: 34.55 KG/M2

## 2019-12-05 DIAGNOSIS — R42 VERTIGO: ICD-10-CM

## 2019-12-05 DIAGNOSIS — R07.89 ATYPICAL CHEST PAIN: ICD-10-CM

## 2019-12-05 DIAGNOSIS — I10 ESSENTIAL HYPERTENSION: ICD-10-CM

## 2019-12-05 DIAGNOSIS — R06.09 OTHER FORM OF DYSPNEA: ICD-10-CM

## 2019-12-05 DIAGNOSIS — I20.8 ANGINA AT REST (HCC): Primary | ICD-10-CM

## 2019-12-05 DIAGNOSIS — Z23 NEED FOR TDAP VACCINATION: ICD-10-CM

## 2019-12-05 DIAGNOSIS — F41.1 GAD (GENERALIZED ANXIETY DISORDER): ICD-10-CM

## 2019-12-05 DIAGNOSIS — I20.8 ANGINA AT REST (HCC): ICD-10-CM

## 2019-12-05 PROBLEM — R06.00 DYSPNEA: Status: ACTIVE | Noted: 2019-12-05

## 2019-12-05 LAB — D-DIMER QUANTITATIVE: <0.27 MG/L FEU (ref 0–0.59)

## 2019-12-05 PROCEDURE — 85379 FIBRIN DEGRADATION QUANT: CPT

## 2019-12-05 PROCEDURE — 90471 IMMUNIZATION ADMIN: CPT | Performed by: FAMILY MEDICINE

## 2019-12-05 PROCEDURE — 71260 CT THORAX DX C+: CPT

## 2019-12-05 PROCEDURE — 2580000003 HC RX 258: Performed by: FAMILY MEDICINE

## 2019-12-05 PROCEDURE — 90715 TDAP VACCINE 7 YRS/> IM: CPT | Performed by: FAMILY MEDICINE

## 2019-12-05 PROCEDURE — 99495 TRANSJ CARE MGMT MOD F2F 14D: CPT | Performed by: FAMILY MEDICINE

## 2019-12-05 PROCEDURE — 1111F DSCHRG MED/CURRENT MED MERGE: CPT | Performed by: FAMILY MEDICINE

## 2019-12-05 PROCEDURE — 6360000004 HC RX CONTRAST MEDICATION: Performed by: FAMILY MEDICINE

## 2019-12-05 PROCEDURE — 36415 COLL VENOUS BLD VENIPUNCTURE: CPT

## 2019-12-05 RX ORDER — METRONIDAZOLE 500 MG/1
TABLET ORAL
COMMUNITY
End: 2019-12-05 | Stop reason: ALTCHOICE

## 2019-12-05 RX ORDER — MECLIZINE HCL 12.5 MG/1
12.5 TABLET ORAL 3 TIMES DAILY PRN
Qty: 15 TABLET | Refills: 1 | Status: SHIPPED | OUTPATIENT
Start: 2019-12-05 | End: 2020-03-31 | Stop reason: SDUPTHER

## 2019-12-05 RX ORDER — ONDANSETRON 4 MG/1
TABLET, ORALLY DISINTEGRATING ORAL
COMMUNITY
End: 2019-12-05

## 2019-12-05 RX ORDER — TRAMADOL HYDROCHLORIDE 50 MG/1
TABLET ORAL
COMMUNITY
End: 2020-01-02

## 2019-12-05 RX ORDER — 0.9 % SODIUM CHLORIDE 0.9 %
80 INTRAVENOUS SOLUTION INTRAVENOUS ONCE
Status: COMPLETED | OUTPATIENT
Start: 2019-12-05 | End: 2019-12-05

## 2019-12-05 RX ORDER — DIAZEPAM 10 MG/1
TABLET ORAL
COMMUNITY
End: 2019-12-05

## 2019-12-05 RX ORDER — HYDROCODONE BITARTRATE AND ACETAMINOPHEN 7.5; 325 MG/1; MG/1
TABLET ORAL
COMMUNITY
End: 2019-12-05

## 2019-12-05 RX ORDER — OXYCODONE AND ACETAMINOPHEN 7.5; 325 MG/1; MG/1
TABLET ORAL
COMMUNITY
End: 2019-12-05

## 2019-12-05 RX ORDER — FLUTICASONE PROPIONATE 50 MCG
SPRAY, SUSPENSION (ML) NASAL
COMMUNITY
End: 2021-02-08 | Stop reason: SDUPTHER

## 2019-12-05 RX ORDER — SODIUM CHLORIDE 0.9 % (FLUSH) 0.9 %
10 SYRINGE (ML) INJECTION 2 TIMES DAILY
Status: DISCONTINUED | OUTPATIENT
Start: 2019-12-05 | End: 2019-12-08 | Stop reason: HOSPADM

## 2019-12-05 RX ORDER — MONTELUKAST SODIUM 10 MG/1
TABLET ORAL
COMMUNITY
End: 2019-12-05

## 2019-12-05 RX ORDER — DEXAMETHASONE SODIUM PHOSPHATE 4 MG/ML
INJECTION, SOLUTION INTRA-ARTICULAR; INTRALESIONAL; INTRAMUSCULAR; INTRAVENOUS; SOFT TISSUE
COMMUNITY
End: 2019-12-05

## 2019-12-05 RX ORDER — AMMONIUM LACTATE 12 G/100G
CREAM TOPICAL
COMMUNITY
End: 2021-02-08

## 2019-12-05 RX ORDER — BLOOD PRESSURE TEST KIT
KIT MISCELLANEOUS
Qty: 1 KIT | Refills: 0 | Status: SHIPPED | OUTPATIENT
Start: 2019-12-05

## 2019-12-05 RX ORDER — BUSPIRONE HYDROCHLORIDE 10 MG/1
10 TABLET ORAL 2 TIMES DAILY
Qty: 60 TABLET | Refills: 0 | Status: SHIPPED | OUTPATIENT
Start: 2019-12-05 | End: 2019-12-27

## 2019-12-05 RX ORDER — DULOXETIN HYDROCHLORIDE 30 MG/1
CAPSULE, DELAYED RELEASE ORAL
COMMUNITY
End: 2019-12-05

## 2019-12-05 RX ADMIN — IOPAMIDOL 75 ML: 755 INJECTION, SOLUTION INTRAVENOUS at 13:32

## 2019-12-05 RX ADMIN — Medication 10 ML: at 13:32

## 2019-12-05 RX ADMIN — SODIUM CHLORIDE 80 ML: 9 INJECTION, SOLUTION INTRAVENOUS at 13:31

## 2019-12-05 ASSESSMENT — ENCOUNTER SYMPTOMS
COLOR CHANGE: 0
DIARRHEA: 0
VOMITING: 0
COUGH: 0
EYE PAIN: 0
TROUBLE SWALLOWING: 0
SORE THROAT: 0
NAUSEA: 0
APNEA: 0
SHORTNESS OF BREATH: 1
CHEST TIGHTNESS: 1
CONSTIPATION: 0
ABDOMINAL PAIN: 0

## 2019-12-19 ENCOUNTER — OFFICE VISIT (OUTPATIENT)
Dept: FAMILY MEDICINE CLINIC | Age: 49
End: 2019-12-19
Payer: MEDICARE

## 2019-12-19 VITALS
BODY MASS INDEX: 34.91 KG/M2 | WEIGHT: 197 LBS | DIASTOLIC BLOOD PRESSURE: 78 MMHG | HEART RATE: 92 BPM | OXYGEN SATURATION: 97 % | SYSTOLIC BLOOD PRESSURE: 116 MMHG | HEIGHT: 63 IN

## 2019-12-19 DIAGNOSIS — R06.83 HABITUAL SNORING: ICD-10-CM

## 2019-12-19 DIAGNOSIS — G47.10 HYPERSOMNIA: ICD-10-CM

## 2019-12-19 DIAGNOSIS — J35.1 ENLARGED TONSILS: ICD-10-CM

## 2019-12-19 DIAGNOSIS — G44.209 TENSION HEADACHE: ICD-10-CM

## 2019-12-19 DIAGNOSIS — I10 ESSENTIAL HYPERTENSION: Primary | ICD-10-CM

## 2019-12-19 PROBLEM — M17.9 OSTEOARTHRITIS OF KNEE: Status: ACTIVE | Noted: 2019-12-19

## 2019-12-19 PROBLEM — M54.16 RADICULOPATHY, LUMBAR REGION: Status: ACTIVE | Noted: 2017-09-18

## 2019-12-19 PROCEDURE — 99214 OFFICE O/P EST MOD 30 MIN: CPT | Performed by: FAMILY MEDICINE

## 2019-12-19 PROCEDURE — G8482 FLU IMMUNIZE ORDER/ADMIN: HCPCS | Performed by: FAMILY MEDICINE

## 2019-12-19 PROCEDURE — G8427 DOCREV CUR MEDS BY ELIG CLIN: HCPCS | Performed by: FAMILY MEDICINE

## 2019-12-19 PROCEDURE — 1036F TOBACCO NON-USER: CPT | Performed by: FAMILY MEDICINE

## 2019-12-19 PROCEDURE — G8417 CALC BMI ABV UP PARAM F/U: HCPCS | Performed by: FAMILY MEDICINE

## 2019-12-19 PROCEDURE — G8598 ASA/ANTIPLAT THER USED: HCPCS | Performed by: FAMILY MEDICINE

## 2019-12-19 RX ORDER — NAPROXEN 500 MG/1
500 TABLET ORAL 2 TIMES DAILY WITH MEALS
Qty: 60 TABLET | Refills: 3 | Status: SHIPPED | OUTPATIENT
Start: 2019-12-19 | End: 2020-04-17 | Stop reason: SDUPTHER

## 2019-12-19 RX ORDER — SUMATRIPTAN 50 MG/1
50 TABLET, FILM COATED ORAL
Qty: 9 TABLET | Refills: 3 | Status: SHIPPED | OUTPATIENT
Start: 2019-12-19 | End: 2020-03-23

## 2019-12-19 RX ORDER — AMLODIPINE BESYLATE 10 MG/1
10 TABLET ORAL DAILY
Qty: 30 TABLET | Refills: 0 | Status: SHIPPED | OUTPATIENT
Start: 2019-12-19 | End: 2020-01-02 | Stop reason: SDUPTHER

## 2019-12-19 ASSESSMENT — ENCOUNTER SYMPTOMS
COLOR CHANGE: 0
VOMITING: 0
ABDOMINAL PAIN: 0
NAUSEA: 1
DIARRHEA: 0
TROUBLE SWALLOWING: 0
SORE THROAT: 0
CHEST TIGHTNESS: 0
SHORTNESS OF BREATH: 0
CONSTIPATION: 0
COUGH: 0
EYE PAIN: 0
APNEA: 0
PHOTOPHOBIA: 1

## 2019-12-27 DIAGNOSIS — F41.1 GAD (GENERALIZED ANXIETY DISORDER): ICD-10-CM

## 2019-12-27 RX ORDER — BUSPIRONE HYDROCHLORIDE 10 MG/1
TABLET ORAL
Qty: 60 TABLET | Refills: 5 | Status: SHIPPED | OUTPATIENT
Start: 2019-12-27 | End: 2020-05-15 | Stop reason: DRUGHIGH

## 2020-01-02 ENCOUNTER — OFFICE VISIT (OUTPATIENT)
Dept: FAMILY MEDICINE CLINIC | Age: 50
End: 2020-01-02
Payer: MEDICARE

## 2020-01-02 VITALS
SYSTOLIC BLOOD PRESSURE: 100 MMHG | HEART RATE: 86 BPM | OXYGEN SATURATION: 96 % | DIASTOLIC BLOOD PRESSURE: 70 MMHG | WEIGHT: 197 LBS | HEIGHT: 63 IN | BODY MASS INDEX: 34.91 KG/M2

## 2020-01-02 PROBLEM — R07.89 ATYPICAL CHEST PAIN: Status: RESOLVED | Noted: 2019-12-05 | Resolved: 2020-01-02

## 2020-01-02 PROBLEM — I20.8 ANGINA AT REST (HCC): Status: RESOLVED | Noted: 2019-12-01 | Resolved: 2020-01-02

## 2020-01-02 PROCEDURE — G8482 FLU IMMUNIZE ORDER/ADMIN: HCPCS | Performed by: FAMILY MEDICINE

## 2020-01-02 PROCEDURE — G8417 CALC BMI ABV UP PARAM F/U: HCPCS | Performed by: FAMILY MEDICINE

## 2020-01-02 PROCEDURE — 99214 OFFICE O/P EST MOD 30 MIN: CPT | Performed by: FAMILY MEDICINE

## 2020-01-02 PROCEDURE — G8427 DOCREV CUR MEDS BY ELIG CLIN: HCPCS | Performed by: FAMILY MEDICINE

## 2020-01-02 PROCEDURE — 1036F TOBACCO NON-USER: CPT | Performed by: FAMILY MEDICINE

## 2020-01-02 RX ORDER — GABAPENTIN 100 MG/1
CAPSULE ORAL
Qty: 18 CAPSULE | Refills: 0 | Status: SHIPPED | OUTPATIENT
Start: 2020-01-02 | End: 2020-01-24 | Stop reason: SDUPTHER

## 2020-01-02 RX ORDER — CARVEDILOL 6.25 MG/1
6.25 TABLET ORAL 2 TIMES DAILY WITH MEALS
Qty: 180 TABLET | Refills: 3 | Status: SHIPPED | OUTPATIENT
Start: 2020-01-02 | End: 2020-01-24 | Stop reason: SDUPTHER

## 2020-01-02 RX ORDER — AMLODIPINE BESYLATE 10 MG/1
10 TABLET ORAL DAILY
Qty: 90 TABLET | Refills: 3 | Status: SHIPPED | OUTPATIENT
Start: 2020-01-02 | End: 2020-01-02 | Stop reason: SDUPTHER

## 2020-01-02 RX ORDER — BACLOFEN 10 MG/1
10 TABLET ORAL 3 TIMES DAILY PRN
Qty: 90 TABLET | Refills: 0 | Status: SHIPPED | OUTPATIENT
Start: 2020-01-02 | End: 2020-01-24 | Stop reason: SDUPTHER

## 2020-01-02 RX ORDER — AMLODIPINE BESYLATE 10 MG/1
10 TABLET ORAL EVERY EVENING
Qty: 90 TABLET | Refills: 3 | Status: SHIPPED | OUTPATIENT
Start: 2020-01-02 | End: 2020-01-24 | Stop reason: SDUPTHER

## 2020-01-02 RX ORDER — LISINOPRIL 10 MG/1
10 TABLET ORAL DAILY
Qty: 90 TABLET | Refills: 3 | Status: SHIPPED | OUTPATIENT
Start: 2020-01-02 | End: 2020-01-24 | Stop reason: SDUPTHER

## 2020-01-02 ASSESSMENT — ENCOUNTER SYMPTOMS
ABDOMINAL PAIN: 0
SHORTNESS OF BREATH: 0
CHEST TIGHTNESS: 0
BACK PAIN: 1
NAUSEA: 0
WHEEZING: 0
VOMITING: 0
ABDOMINAL DISTENTION: 0
COUGH: 0
DIARRHEA: 0
CONSTIPATION: 0

## 2020-01-02 ASSESSMENT — PATIENT HEALTH QUESTIONNAIRE - PHQ9
SUM OF ALL RESPONSES TO PHQ QUESTIONS 1-9: 0
SUM OF ALL RESPONSES TO PHQ9 QUESTIONS 1 & 2: 0
1. LITTLE INTEREST OR PLEASURE IN DOING THINGS: 0
2. FEELING DOWN, DEPRESSED OR HOPELESS: 0
SUM OF ALL RESPONSES TO PHQ QUESTIONS 1-9: 0

## 2020-01-02 NOTE — PATIENT INSTRUCTIONS
ounces (½ cup) of vegetable juice. Choose vegetables more often than vegetable juice. · Get 2 to 3 servings of low-fat and fat-free dairy each day. A serving is 8 ounces of milk, 1 cup of yogurt, or 1 ½ ounces of cheese. · Eat 6 to 8 servings of grains each day. A serving is 1 slice of bread, 1 ounce of dry cereal, or ½ cup of cooked rice, pasta, or cooked cereal. Try to choose whole-grain products as much as possible. · Limit lean meat, poultry, and fish to 2 servings each day. A serving is 3 ounces, about the size of a deck of cards. · Eat 4 to 5 servings of nuts, seeds, and legumes (cooked dried beans, lentils, and split peas) each week. A serving is 1/3 cup of nuts, 2 tablespoons of seeds, or ½ cup of cooked beans or peas. · Limit fats and oils to 2 to 3 servings each day. A serving is 1 teaspoon of vegetable oil or 2 tablespoons of salad dressing. · Limit sweets and added sugars to 5 servings or less a week. A serving is 1 tablespoon jelly or jam, ½ cup sorbet, or 1 cup of lemonade. · Eat less than 2,300 milligrams (mg) of sodium a day. If you limit your sodium to 1,500 mg a day, you can lower your blood pressure even more. Tips for success  · Start small. Do not try to make dramatic changes to your diet all at once. You might feel that you are missing out on your favorite foods and then be more likely to not follow the plan. Make small changes, and stick with them. Once those changes become habit, add a few more changes. · Try some of the following:  ? Make it a goal to eat a fruit or vegetable at every meal and at snacks. This will make it easy to get the recommended amount of fruits and vegetables each day. ? Try yogurt topped with fruit and nuts for a snack or healthy dessert. ? Add lettuce, tomato, cucumber, and onion to sandwiches. ? Combine a ready-made pizza crust with low-fat mozzarella cheese and lots of vegetable toppings.  Try using tomatoes, squash, spinach, broccoli, carrots, cauliflower, and onions. ? Have a variety of cut-up vegetables with a low-fat dip as an appetizer instead of chips and dip. ? Sprinkle sunflower seeds or chopped almonds over salads. Or try adding chopped walnuts or almonds to cooked vegetables. ? Try some vegetarian meals using beans and peas. Add garbanzo or kidney beans to salads. Make burritos and tacos with mashed schilling beans or black beans. Where can you learn more? Go to https://[employer]web.[employer]. org and sign in to your [employer] account. Enter G841 in the [facility] box to learn more about \"DASH Diet: Care Instructions. \"     If you do not have an account, please click on the \"Sign Up Now\" link. Current as of: July 22, 2018  Content Version: 12.1  © 8223-3214 Healthwise, [facility]. Care instructions adapted under license by [facility] ([facility]). If you have questions about a medical condition or this instruction, always ask your healthcare professional. [place] [address] any warranty or liability for your use of this information.

## 2020-01-02 NOTE — PROGRESS NOTES
Chief Complaint   Patient presents with    Hypertension    Edema     bilat legs - x 1 week     Fall     did not go to the hospital - happened two days ago - hurt her left knee and right ankle        Franklin Fulton here today for follow up on chronic medical problems, go over labs and/or diagnostic studies, and medication refills. Hypertension; Edema (bilat legs - x 1 week ); and Fall (did not go to the hospital - happened two days ago - hurt her left knee and right ankle )      Hypertension: Patient here for follow-up of elevated blood pressure. She is not exercising and is adherent to low salt diet. Blood pressure is well controlled at home. Cardiac symptoms fatigue and lower extremity edema. Patient denies chest pain, chest pressure/discomfort, claudication, dyspnea, exertional chest pressure/discomfort, irregular heart beat, near-syncope, orthopnea, palpitations, paroxysmal nocturnal dyspnea, syncope and tachypnea. Cardiovascular risk factors: dyslipidemia, hypertension and obesity (BMI >= 30 kg/m2). Use of agents associated with hypertension: NSAIDS. History of target organ damage: none. Patient had cardiac cath 12/1/2019 showing normal coronary arteries, with distal apical segment diffuse narrowing and normal LV function, she was started on Norvasc    Takes Amlodipine 10 mg    Didn't  see cardio since the cardiac cath. Reprinted referral to cardio, and advised patient to schedule appointment with specialist.    BP Readings from Last 3 Encounters:   01/02/20 100/70   12/19/19 116/78   12/05/19 136/89          Pulse is Normal.    Pulse Readings from Last 3 Encounters:   01/02/20 86   12/19/19 92   12/05/19 86       Weight has been stable   Wt Readings from Last 3 Encounters:   01/02/20 197 lb (89.4 kg)   12/19/19 197 lb (89.4 kg)   12/05/19 195 lb (88.5 kg)       Patient has known muscular dystrophy Charcot-Shanel-Tooth. Patient does not have any feeling in her legs below the knees.   Patient has for chest pain and palpitations. Gastrointestinal: Negative for abdominal distention, abdominal pain, constipation, diarrhea, nausea and vomiting. Endocrine: Negative for cold intolerance, heat intolerance, polydipsia, polyphagia and polyuria. Musculoskeletal: Positive for arthralgias, back pain, gait problem and myalgias. Neurological: Positive for weakness (legs, wearing braces) and numbness (legs and lateral thighs). Hematological: Does not bruise/bleed easily. Psychiatric/Behavioral: Positive for decreased concentration and sleep disturbance. Negative for dysphoric mood, self-injury and suicidal ideas. The patient is nervous/anxious.          -vital signs stable and within normal limits except Obesity per BMI   /70   Pulse 86   Ht 5' 3\" (1.6 m)   Wt 197 lb (89.4 kg)   SpO2 96%   BMI 34.90 kg/m²        Physical Exam  Vitals signs and nursing note reviewed. Constitutional:       General: She is not in acute distress. Appearance: She is well-developed. She is obese. She is not diaphoretic. HENT:      Head: Normocephalic and atraumatic. Right Ear: External ear normal.      Left Ear: External ear normal.      Nose: Nose normal. No mucosal edema. Mouth/Throat:      Mouth: Mucous membranes are moist.      Pharynx: No oropharyngeal exudate. Eyes:      General: Lids are normal. No scleral icterus. Right eye: No discharge. Left eye: No discharge. Conjunctiva/sclera: Conjunctivae normal.   Neck:      Musculoskeletal: Normal range of motion and neck supple. Thyroid: No thyromegaly. Cardiovascular:      Rate and Rhythm: Normal rate and regular rhythm. Heart sounds: Normal heart sounds. No murmur. Pulmonary:      Effort: Pulmonary effort is normal. No respiratory distress. Breath sounds: Normal breath sounds. No wheezing or rales. Chest:      Chest wall: No tenderness. Abdominal:      General: Bowel sounds are normal. There is no distension. Lab Results   Component Value Date    CHOL 182 01/12/2018    CHOL 202 (H) 12/16/2016     Lab Results   Component Value Date    TRIG 138 01/12/2018    TRIG 162 (H) 12/16/2016     Lab Results   Component Value Date    HDL 52 01/12/2018    HDL 60 12/16/2016     Lab Results   Component Value Date    LDLCHOLESTEROL 102 01/12/2018    LDLCHOLESTEROL 110 12/16/2016       Lab Results   Component Value Date    CHOLHDLRATIO 3.5 01/12/2018    CHOLHDLRATIO 3.4 12/16/2016         Lab Results   Component Value Date    UQXBFTRV68 652 03/20/2018     Lab Results   Component Value Date    FOLATE >20.0 03/20/2018     Lab Results   Component Value Date    VITD25 55.5 03/20/2018           ASSESSMENT AND PLAN      1. Essential hypertension  Well controlled. Continue current treatment. Will recheck labs. - lisinopril (PRINIVIL;ZESTRIL) 10 MG tablet; Take 1 tablet by mouth daily  Dispense: 90 tablet; Refill: 3  - carvedilol (COREG) 6.25 MG tablet; Take 1 tablet by mouth 2 times daily (with meals)  Dispense: 180 tablet; Refill: 3  - CBC; Future  - Comprehensive Metabolic Panel; Future  - TSH without Reflex; Future  - amLODIPine (NORVASC) 10 MG tablet; Take 1 tablet by mouth every evening  Dispense: 90 tablet; Refill: 3  Discussed low salt diet and BP and pulse monitoring daily, BP log given  Advised compression stockings for leg edema    2. Charcot Shanel Tooth muscular atrophy  Stable  Needs new providers, Dr. Elvi Carcamo retired  - Ju Skaggs MD, Physical Medicine and Rehabilitation, [facility], MD, Neurology, [place]  -start gabapentin (NEURONTIN) 100 MG capsule; Take 1 capsule by mouth nightly for 3 days, THEN 1 capsule 2 times daily for 3 days, THEN 1 capsule 3 times daily for 3 days. Dispense: 18 capsule; Refill: 0--trial of, can call for refills   - start baclofen (LIORESAL) 10 MG tablet;  Take 1 tablet by mouth 3 times daily as needed (MUSCLE SPASMS) Causes sedation, do not drive while taking this medication  Dispense: 90 tablet; Refill: 0    3. Gait abnormality  Failing to change as expected. Falls precautions discussed  Use cane at all times    - Ainsley Samayoa MD, Physical Medicine and Rehabilitation, [address] [facility], MD, Neurology, [place]    4. Bilateral foot-drop  Failing to change as expected. - gabapentin (NEURONTIN) 100 MG capsule; Take 1 capsule by mouth nightly for 3 days, THEN 1 capsule 2 times daily for 3 days, THEN 1 capsule 3 times daily for 3 days. Dispense: 18 capsule; Refill: 0  - Ainsley Samayoa MD, Physical Medicine and Rehabilitation, [address]    5. Chronic back pain greater than 3 months duration  Failing to change as expected. start- gabapentin (NEURONTIN) 100 MG capsule; Take 1 capsule by mouth nightly for 3 days, THEN 1 capsule 2 times daily for 3 days, THEN 1 capsule 3 times daily for 3 days. Dispense: 18 capsule; Refill: 0  - start baclofen (LIORESAL) 10 MG tablet; Take 1 tablet by mouth 3 times daily as needed (MUSCLE SPASMS) Causes sedation, do not drive while taking this medication  Dispense: 90 tablet; Refill: 0  Start PT    6. Recurrent major depressive disorder, in partial remission ([address]  Improved  Will continue to monitor. 7. Screening for cardiovascular condition  - Lipid Panel; Future      I reprinted the referral to cardiologist and advised to follow-up    Patient was advised to change Norvasc to bedtime and to see if the leg edema improves  Patient was advised to buy compression stockings  Start gabapentin and baclofen, stop NSAIDs    Data Unavailable      Controlled Substance Monitoring:    Acute and Chronic Pain Monitoring:   RX Monitoring 1/2/2020   Attestation -   Periodic Controlled Substance Monitoring No signs of potential drug abuse or diversion identified. ;Possible medication side effects, risk of tolerance/dependence & alternative treatments discussed. ;Assessed functional status.    Chronic Pain > 80 MEDD -           Orders Placed This Encounter   Procedures    CBC     Standing Status:   Future     Standing Expiration Date:   1/2/2021    Comprehensive Metabolic Panel     Standing Status:   Future     Standing Expiration Date:   1/2/2021    TSH without Reflex     Standing Status:   Future     Standing Expiration Date:   1/2/2021    Lipid Panel     Standing Status:   Future     Standing Expiration Date:   1/2/2021     Order Specific Question:   Is Patient Fasting?/# of Hours     Answer:   8-10 Hours, water ok to drink   Mikayla Barreto MD, Physical Medicine and Rehabilitation, [place]     Referral Priority:   Routine     Referral Type:   Eval and Treat     Referral Reason:   Specialty Services Required     Referred to Provider:   Jocelyn Santizo MD     Requested Specialty:   Physical Medicine and Rehab     Number of Visits Requested:   [address]     Referral Priority:   Routine     Referral Type:   Eval and Treat     Referral Reason:   Specialty Services Required     Requested Specialty:   Physical Therapy     Number of Visits Requested:   Author Addi MD, Neurology, [place]     Referral Priority:   Routine     Referral Type:   Eval and Treat     Referral Reason:   Specialty Services Required     Referred to Provider:   Javier Sanchez MD     Requested Specialty:   Neurology     Number of Visits Requested:   1         Medications Discontinued During This Encounter   Medication Reason    traMADol (ULTRAM) 50 MG tablet     amLODIPine (NORVASC) 10 MG tablet REORDER    ibuprofen (ADVIL;MOTRIN) 200 MG tablet Therapy completed    lisinopril (PRINIVIL;ZESTRIL) 10 MG tablet REORDER    carvedilol (COREG) 6.25 MG tablet REORDER    amLODIPine (NORVASC) 10 MG tablet REORDER       Sushma received counseling on the following healthy behaviors: nutrition, exercise, medication adherence and weight loss    Reviewed prior labs and health maintenance  Continue current medications, diet and exercise. Discussed use, benefit, and side effects of prescribed medications. Barriers to medication compliance addressed. Patient given educational materials - see patient instructions  Was a self-tracking handout given in paper form or via [email or web address]? Yes    Requested Prescriptions     Signed Prescriptions Disp Refills    lisinopril (PRINIVIL;ZESTRIL) 10 MG tablet 90 tablet 3     Sig: Take 1 tablet by mouth daily    carvedilol (COREG) 6.25 MG tablet 180 tablet 3     Sig: Take 1 tablet by mouth 2 times daily (with meals)    gabapentin (NEURONTIN) 100 MG capsule 18 capsule 0     Sig: Take 1 capsule by mouth nightly for 3 days, THEN 1 capsule 2 times daily for 3 days, THEN 1 capsule 3 times daily for 3 days.  amLODIPine (NORVASC) 10 MG tablet 90 tablet 3     Sig: Take 1 tablet by mouth every evening    baclofen (LIORESAL) 10 MG tablet 90 tablet 0     Sig: Take 1 tablet by mouth 3 times daily as needed (MUSCLE SPASMS) Causes sedation, do not drive while taking this medication       All patient questions answered. Patient voiced understanding. Quality Measures    Body mass index is 34.9 kg/m². Elevated. Weight control planned discussed conventional weight loss and Healthy diet and regular exercise. BP: 100/70 Blood pressure is normal. Treatment plan consists of Weight Reduction, DASH Eating Plan, Dietary Sodium Restriction, Increased Physical Activity, Patient In-home Blood Pressure Monitoring and No treatment change needed. Lab Results   Component Value Date    LDLCHOLESTEROL 102 01/12/2018    (goal LDL reduction with dx if diabetes is 50% LDL reduction)      Negative depression screening.    PHQ Scores 1/2/2020 11/21/2019 4/22/2019 3/6/2018 1/16/2018 9/23/2014   PHQ2 Score 0 0 4 3 6 6   PHQ9 Score 0 0 19 11 18 23           The patient's past

## 2020-01-02 NOTE — LETTER
disease. Overdose or dangerous interactions with alcohol and other medications may occur, leading to death. Hyperalgesia may develop, in which patients receiving opioids for the treatment of pain may actually become more sensitive to certain painful stimuli, and in some cases, experience pain from ordinarily non-painful stimuli. Women between the ages of 14-53 who could become pregnant should carefully weigh the risks and benefits of opioids with their physicians, as these medications increase the risk of pregnancy complications, including miscarriage,  delivery and stillbirth. It is also possible for babies to be born addicted to opioids. Opioid dependence withdrawal symptoms may include; feelings of uneasiness, increased pain, irritability, belly pain, diarrhea, sweats and goose-flesh. Benzodiazepines and non-benzodiazepine sleep medications: These medications can lead to problems such as addiction/dependence, sedation, fatigue, lightheadedness, dizziness, incoordination, falls, depression, hallucinations, and impaired judgment, memory and concentration. The ability to drive and operate machinery may also be affected. Abnormal sleep-related behaviors have been reported, including sleep walking, driving, making telephone calls, eating, or having sex while not fully awake. These medications can suppress breathing and worsen sleep apnea, particularly when combined with alcohol or other sedating medications, potentially leading to death. Dependence withdrawal symptoms may include tremors, anxiety, hallucinations and seizures. Stimulants:  Common adverse effects include addiction/dependence, increased blood pressure and heart rate, decreased appetite, nausea, involuntary weight loss, insomnia, irritability, and headaches.   These risks may increase when these medications are combined with other stimulants, such as caffeine pills or energy drinks, certain weight loss supplements and oral decongestants. Dependence withdrawal symptoms may include depressed mood, loss of interest, suicidal thoughts, anxiety, fatigue, appetite changes and agitation. Testosterone replacement therapy:  Potential side effects include increased risk of stroke and heart attack, blood clots, increased blood pressure, increased cholesterol, enlarged prostate, sleep apnea, irritability/aggression and other mood disorders, and decreased fertility. Other:     1. I understand that I have the following responsibilities:  · I will take medications at the dose and frequency prescribed. · I will not increase or change how I take my medications without the approval of the health care provider who signs this Medication Agreement. · I will arrange for refills at the prescribed interval ONLY during regular office hours. I will not ask for refills earlier than agreed, after-hours, on holidays or on weekends. · I will obtain all refills for these medications at  ·  ____________________________________  pharmacy (phone number  ·  ________________________), with full consent for my provider and pharmacist to exchange information in writing or verbally. · I will not request any pain medications or controlled substances from other providers and will inform this provider of all other medications I am taking. · I will inform my other health care providers that I am taking these medications and of the existence of this [address]. In the event of an emergency, I will provide the same information to the emergency department providers. · I will protect my prescriptions and medications. I understand that lost or misplaced prescriptions will not be replaced. · I will keep medications only for my own use and will not share them with others. I will keep all medications away from children. · I agree to participate in any medical, psychological or psychiatric assessments recommended by my provider. which may also result in my being prevented from receiving further care from this office. · Other:____________________________________________________________________    AGREEMENT:    I have read the above and have had all of my questions answered. For chronic disease management, I know that my symptoms can be managed with many types of treatments. A chronic medication trial may be part of my treatment, but I must be an active participant in my care. Medication therapy is only one part of my symptom management plan. In some cases, there may be limited scientific evidence to support the chronic use of certain medications to improve symptoms and daily function. Furthermore, in certain circumstances, there may be scientific information that suggests that use of chronic controlled substances may actually worsen my symptoms and increase my risk of unintentional death directly related to this medication therapy. I know that if my provider feels my risk from controlled medications is greater than my benefit, I will have my controlled substance medication(s) compassionately lowered or removed altogether. I agree to a controlled substance medication trial.      I further agree to allow this office to contact my HIPAA contact on file if there are concerns about my safety and use of controlled medications. I have agreed to use the following medications above as instructed by my physician and as stated in this [address].      Patient Signature:  ______________________  Date:1/2/2020 or _____________    Provider Signature:______________________  Date:1/2/2020 or _____________

## 2020-01-24 RX ORDER — CARVEDILOL 6.25 MG/1
6.25 TABLET ORAL 2 TIMES DAILY WITH MEALS
Qty: 180 TABLET | Refills: 3 | Status: SHIPPED | OUTPATIENT
Start: 2020-01-24 | End: 2020-10-28 | Stop reason: SDUPTHER

## 2020-01-24 RX ORDER — LISINOPRIL 10 MG/1
10 TABLET ORAL DAILY
Qty: 90 TABLET | Refills: 3 | Status: SHIPPED | OUTPATIENT
Start: 2020-01-24 | End: 2020-10-28 | Stop reason: SDUPTHER

## 2020-01-24 RX ORDER — GABAPENTIN 100 MG/1
CAPSULE ORAL
Qty: 18 CAPSULE | Refills: 0 | Status: SHIPPED | OUTPATIENT
Start: 2020-01-24 | End: 2020-02-04 | Stop reason: SDUPTHER

## 2020-01-24 RX ORDER — BACLOFEN 10 MG/1
10 TABLET ORAL 3 TIMES DAILY PRN
Qty: 90 TABLET | Refills: 0 | Status: SHIPPED | OUTPATIENT
Start: 2020-01-24 | End: 2020-10-28 | Stop reason: SDUPTHER

## 2020-01-24 RX ORDER — AMLODIPINE BESYLATE 10 MG/1
10 TABLET ORAL EVERY EVENING
Qty: 90 TABLET | Refills: 3 | Status: SHIPPED | OUTPATIENT
Start: 2020-01-24 | End: 2020-10-28 | Stop reason: SDUPTHER

## 2020-01-24 NOTE — TELEPHONE ENCOUNTER
Patient was not able to refill her medications  , first she had them transfer to a [address] [place] [address] due to her insurance , she was not able to get them there neither so therefore Claire cancelled orders , per pt   Patient asking if you can resend   Thank you   Please Approve or Refuse.   Send to Pharmacy per Pt's Request:      Next Visit Date:  2/21/2020   Last Visit Date: 1/2/2020    Hemoglobin A1C (%)   Date Value   04/22/2019 4.7   03/20/2018 5.1   07/08/2016 4.6             ( goal A1C is < 7)   BP Readings from Last 3 Encounters:   01/02/20 100/70   12/19/19 116/78   12/05/19 136/89          (goal 120/80)  BUN   Date Value Ref Range Status   12/02/2019 13 6 - 20 mg/dL Final     CREATININE   Date Value Ref Range Status   12/02/2019 0.54 0.50 - 0.90 mg/dL Final     Potassium   Date Value Ref Range Status   12/02/2019 3.9 3.7 - 5.3 mmol/L Final

## 2020-02-04 RX ORDER — GABAPENTIN 100 MG/1
100 CAPSULE ORAL 3 TIMES DAILY
Qty: 90 CAPSULE | Refills: 0 | Status: SHIPPED | OUTPATIENT
Start: 2020-02-04 | End: 2020-02-21 | Stop reason: DRUGHIGH

## 2020-02-21 ENCOUNTER — OFFICE VISIT (OUTPATIENT)
Dept: FAMILY MEDICINE CLINIC | Age: 50
End: 2020-02-21
Payer: MEDICARE

## 2020-02-21 VITALS
DIASTOLIC BLOOD PRESSURE: 70 MMHG | WEIGHT: 202 LBS | OXYGEN SATURATION: 98 % | HEIGHT: 63 IN | BODY MASS INDEX: 35.79 KG/M2 | HEART RATE: 74 BPM | SYSTOLIC BLOOD PRESSURE: 102 MMHG

## 2020-02-21 PROCEDURE — 99214 OFFICE O/P EST MOD 30 MIN: CPT | Performed by: FAMILY MEDICINE

## 2020-02-21 PROCEDURE — G0444 DEPRESSION SCREEN ANNUAL: HCPCS | Performed by: FAMILY MEDICINE

## 2020-02-21 PROCEDURE — G8427 DOCREV CUR MEDS BY ELIG CLIN: HCPCS | Performed by: FAMILY MEDICINE

## 2020-02-21 PROCEDURE — G8482 FLU IMMUNIZE ORDER/ADMIN: HCPCS | Performed by: FAMILY MEDICINE

## 2020-02-21 PROCEDURE — G8431 POS CLIN DEPRES SCRN F/U DOC: HCPCS | Performed by: FAMILY MEDICINE

## 2020-02-21 PROCEDURE — G8417 CALC BMI ABV UP PARAM F/U: HCPCS | Performed by: FAMILY MEDICINE

## 2020-02-21 PROCEDURE — 1036F TOBACCO NON-USER: CPT | Performed by: FAMILY MEDICINE

## 2020-02-21 RX ORDER — DULOXETIN HYDROCHLORIDE 30 MG/1
30 CAPSULE, DELAYED RELEASE ORAL DAILY
Qty: 30 CAPSULE | Refills: 0 | Status: SHIPPED | OUTPATIENT
Start: 2020-02-21 | End: 2020-03-16

## 2020-02-21 RX ORDER — GABAPENTIN 300 MG/1
300 CAPSULE ORAL 3 TIMES DAILY
Qty: 90 CAPSULE | Refills: 0 | Status: SHIPPED | OUTPATIENT
Start: 2020-02-21 | End: 2020-03-10

## 2020-02-21 ASSESSMENT — ENCOUNTER SYMPTOMS
NAUSEA: 0
ABDOMINAL PAIN: 0
CHEST TIGHTNESS: 0
CONSTIPATION: 0
ABDOMINAL DISTENTION: 0
VOMITING: 0
DIARRHEA: 0
SHORTNESS OF BREATH: 0
COUGH: 0
BACK PAIN: 1
WHEEZING: 0

## 2020-02-21 ASSESSMENT — COLUMBIA-SUICIDE SEVERITY RATING SCALE - C-SSRS
1. WITHIN THE PAST MONTH, HAVE YOU WISHED YOU WERE DEAD OR WISHED YOU COULD GO TO SLEEP AND NOT WAKE UP?: NO
6. HAVE YOU EVER DONE ANYTHING, STARTED TO DO ANYTHING, OR PREPARED TO DO ANYTHING TO END YOUR LIFE?: NO
2. HAVE YOU ACTUALLY HAD ANY THOUGHTS OF KILLING YOURSELF?: NO

## 2020-02-21 ASSESSMENT — PATIENT HEALTH QUESTIONNAIRE - PHQ9
8. MOVING OR SPEAKING SO SLOWLY THAT OTHER PEOPLE COULD HAVE NOTICED. OR THE OPPOSITE, BEING SO FIGETY OR RESTLESS THAT YOU HAVE BEEN MOVING AROUND A LOT MORE THAN USUAL: 0
10. IF YOU CHECKED OFF ANY PROBLEMS, HOW DIFFICULT HAVE THESE PROBLEMS MADE IT FOR YOU TO DO YOUR WORK, TAKE CARE OF THINGS AT HOME, OR GET ALONG WITH OTHER PEOPLE: 3
SUM OF ALL RESPONSES TO PHQ QUESTIONS 1-9: 21
2. FEELING DOWN, DEPRESSED OR HOPELESS: 3
7. TROUBLE CONCENTRATING ON THINGS, SUCH AS READING THE NEWSPAPER OR WATCHING TELEVISION: 3
4. FEELING TIRED OR HAVING LITTLE ENERGY: 3
SUM OF ALL RESPONSES TO PHQ QUESTIONS 1-9: 21
5. POOR APPETITE OR OVEREATING: 3
1. LITTLE INTEREST OR PLEASURE IN DOING THINGS: 3
9. THOUGHTS THAT YOU WOULD BE BETTER OFF DEAD, OR OF HURTING YOURSELF: 0
SUM OF ALL RESPONSES TO PHQ9 QUESTIONS 1 & 2: 6
3. TROUBLE FALLING OR STAYING ASLEEP: 3
6. FEELING BAD ABOUT YOURSELF - OR THAT YOU ARE A FAILURE OR HAVE LET YOURSELF OR YOUR FAMILY DOWN: 3

## 2020-02-21 NOTE — PROGRESS NOTES
Chief Complaint   Patient presents with    Hypertension    Hyperlipidemia    Depression    Weight Gain    Knee Pain     b/l    Pain     Back and knees       Here for chronic and new medical problems. Hypertension: Patient here for follow-up of elevated blood pressure. She is not exercising and is adherent to low salt diet. Blood pressure is well controlled at home. Cardiac symptoms fatigue. Patient denies chest pain, chest pressure/discomfort, claudication, dyspnea, exertional chest pressure/discomfort, irregular heart beat, lower extremity edema, near-syncope, orthopnea, palpitations, paroxysmal nocturnal dyspnea, syncope and tachypnea. Cardiovascular risk factors: dyslipidemia, hypertension and obesity (BMI >= 30 kg/m2). Use of agents associated with hypertension: NSAIDS. History of target organ damage: none. Cardiac cath 12/1/2019 showed normal coronary arteries, distal apical segment diffuse narrowing, normal LV function. She was started on Norvasc which she has been taking    BP controlled. Pavel Holbrook reports compliance with BP medications, and tolerates them well, denies side effects. BP Readings from Last 3 Encounters:   02/21/20 102/70   01/02/20 100/70   12/19/19 116/78      Pulse is Normal.    Pulse Readings from Last 3 Encounters:   02/21/20 74   01/02/20 86   12/19/19 92       Obesity per BMI. Body mass index is 35.78 kg/m². Patient also wants to lose weight, she is concerned about unintentional weight gain. I told her it is just 5 pounds from the last appointment. She never completed a TSH level that I have ordered at the last appointment. I gave her low-carb diet, will start Cymbalta for depression, and will start aqua therapy, and all of them it will help her lose weight      Weight has been increasing    Wt Readings from Last 3 Encounters:   02/21/20 202 lb (91.6 kg)   01/02/20 197 lb (89.4 kg)   12/19/19 197 lb (89.4 kg)       Depression  Has been worsening.  She says: she is in a lot of pain  Intensity of pain is 8-9/10  Pain interferes with sleep and activities. Sushma complains of Knee pains, worsening, will see othopedics, but does not have an appointment   Has chronic back pain in the lower back, radiating to the sides, worse with activities. Didn't start PT, changed insurance, needs new order. She does have appointment with neurology and PMR as referred. She has been wearing her leg braces. Patient has foot drop bilaterally, with abnormality of the gait. Her prior muscular dystrophy doc retired. Patient has falls due to tripping despite being careful all the time. Last fall was 12/31/2019. Knee Pain: Patient presents with knee pain involving the  bilateral knee. Onset of the symptoms was several years ago. Inciting event: none known. Current symptoms include crepitus sensation, giving out, popping sensation and stiffness. Pain is aggravated by lateral movements, rising after sitting, squatting, standing and walking. Patient has had prior knee problems. Evaluation to date: none. Treatment to date: NSAIDs, and had scope on the left      Due for lipids screening. Due to age and HTN, Belinda Elizabeth is due for lipids screening. Belinda Elizabeth is not eating low fat diet. she is not exercising. she is not taking any over the counter supplements. Current Outpatient Medications   Medication Sig Dispense Refill    gabapentin (NEURONTIN) 100 MG capsule Take 1 capsule by mouth 3 times daily for 30 days.  90 capsule 0    lisinopril (PRINIVIL;ZESTRIL) 10 MG tablet Take 1 tablet by mouth daily 90 tablet 3    carvedilol (COREG) 6.25 MG tablet Take 1 tablet by mouth 2 times daily (with meals) 180 tablet 3    amLODIPine (NORVASC) 10 MG tablet Take 1 tablet by mouth every evening 90 tablet 3    baclofen (LIORESAL) 10 MG tablet Take 1 tablet by mouth 3 times daily as needed (MUSCLE SPASMS) Causes sedation, do not drive while taking this medication 90 tablet 0    Hematological: Does not bruise/bleed easily. Psychiatric/Behavioral: Positive for decreased concentration, dysphoric mood and sleep disturbance. Negative for hallucinations, self-injury and suicidal ideas. The patient is nervous/anxious.          -vital signs stable and within normal limits except Obesity per BMI. /70   Pulse 74   Ht 5' 3\" (1.6 m)   Wt 202 lb (91.6 kg)   SpO2 98%   BMI 35.78 kg/m²        Physical Exam  Vitals signs and nursing note reviewed. Constitutional:       General: She is not in acute distress. Appearance: She is well-developed. She is obese. She is not diaphoretic. HENT:      Head: Normocephalic and atraumatic. Right Ear: External ear normal.      Left Ear: External ear normal.      Nose: Nose normal. No mucosal edema. Mouth/Throat:      Mouth: Mucous membranes are moist.      Pharynx: No oropharyngeal exudate. Eyes:      General: Lids are normal. No scleral icterus. Right eye: No discharge. Left eye: No discharge. Conjunctiva/sclera: Conjunctivae normal.   Neck:      Musculoskeletal: Normal range of motion and neck supple. Thyroid: No thyromegaly. Cardiovascular:      Rate and Rhythm: Normal rate and regular rhythm. Heart sounds: Normal heart sounds. No murmur. Pulmonary:      Effort: Pulmonary effort is normal. No respiratory distress. Breath sounds: Normal breath sounds. No wheezing or rales. Chest:      Chest wall: No tenderness. Abdominal:      General: Bowel sounds are normal. There is no distension. Palpations: Abdomen is soft. There is no hepatomegaly or splenomegaly. Tenderness: There is no abdominal tenderness. Comments: Obese abdomen   Musculoskeletal:         General: Tenderness present. No swelling. Right knee: She exhibits decreased range of motion. Tenderness found. Patellar tendon tenderness noted. Left knee: She exhibits decreased range of motion. Tenderness found. Lab Results   Component Value Date    TSH 0.67 01/12/2018       Lab Results   Component Value Date    CHOL 182 01/12/2018    CHOL 202 (H) 12/16/2016     Lab Results   Component Value Date    TRIG 138 01/12/2018    TRIG 162 (H) 12/16/2016     Lab Results   Component Value Date    HDL 52 01/12/2018    HDL 60 12/16/2016     Lab Results   Component Value Date    LDLCHOLESTEROL 102 01/12/2018    LDLCHOLESTEROL 110 12/16/2016       Lab Results   Component Value Date    CHOLHDLRATIO 3.5 01/12/2018    CHOLHDLRATIO 3.4 12/16/2016         Lab Results   Component Value Date    HRLDBFPX90 652 03/20/2018     Lab Results   Component Value Date    FOLATE >20.0 03/20/2018     Lab Results   Component Value Date    VITD25 55.5 03/20/2018           ASSESSMENT AND PLAN      1. Essential hypertension  Well controlled. Continue current treatment. Will recheck labs. - TSH without Reflex; Future  - Basic Metabolic Panel; Future  Discussed low salt diet and BP and pulse monitoring daily, BP log given    2. Severe obesity (BMI 35.0-39. 9) with comorbidity ([address]  worsening  Patient was asked about her current diet and exercise habits, and personalized advice was provided regarding recommended lifestyle changes. Patient's comorbid health conditions associated with elevated BMI were discussed, including dyslipidemia, hyperglycemia, hypertension and mood disorder, as well as the likely benefits of weight loss. Based upon patient's motivation to change her behavior, the following plan was agreed upon to work toward a weight loss goal of 1-2 pounds per week: low carbohydrate diet, increase physical activity as follows: Start aqua therapy as referred and medication prescribed: Start treatment for depression. Educational materials for  weight loss were provided. Patient will follow-up in 1.5 month(s) with PCP. Provider spent 10 minutes counseling patient.       3. Severe episode of recurrent major depressive disorder, without Orthopedic Surgery     Number of Visits Requested:   1    Positive Screen for Clinical Depression with a Documented Follow-up Plan          Medications Discontinued During This Encounter   Medication Reason    gabapentin (NEURONTIN) 100 MG capsule DOSE ADJUSTMENT       Sushma received counseling on the following healthy behaviors: nutrition, exercise, medication adherence and weight loss  Reviewed prior labs and health maintenance  Continue current medications, diet and exercise. Discussed use, benefit, and side effects of prescribed medications. Barriers to medication compliance addressed. Patient given educational materials - see patient instructions  Was a self-tracking handout given in paper form or via [employer]? Yes    Requested Prescriptions     Signed Prescriptions Disp Refills    gabapentin (NEURONTIN) 300 MG capsule 90 capsule 0     Sig: Take 1 capsule by mouth 3 times daily for 30 days. Intended supply: 30 days    DULoxetine (CYMBALTA) 30 MG extended release capsule 30 capsule 0     Sig: Take 1 capsule by mouth daily Call for refills or dose adjustment. All patient questions answered. Patient voiced understanding. Quality Measures    Body mass index is 35.78 kg/m². Elevated. Weight control planned discussed conventional weight loss, aqua therapy, start treatment for Cymbalta, low-carb diet, and regular exercise. BP: 102/70 Blood pressure is normal. Treatment plan consists of Weight Reduction, DASH Eating Plan, Dietary Sodium Restriction, Increased Physical Activity, Patient In-home Blood Pressure Monitoring and No treatment change needed. The patient's past medical, surgical, social, and family history as well as her   current medications and allergies were reviewed as documented in today's encounter. Medications, labs, diagnostic studies, consultations and follow-up as documented in this encounter.     Return in about 3 months (around 5/21/2020) for LABS F/U, F/U Xrays, HTN,HLP. Patient was seen with total face to face time of 25 minutes. More than 50% of this visit was counseling and education. Future Appointments   Date Time Provider Dom Maya   3/10/2020 10:00 AM [facility]iffer [facility]   3/17/2020  3:00 PM Beth Lucia MD [address]   3/31/2020  9:00 AM Nivia Cabezas MD [facility]   5/22/2020  9:45 AM Nivia Cabezas MD [facility] Hilary Kirby        This note was completed by using the assistance of a speech-recognition program. However, inadvertent computerized transcription errors may be present. Although every effort was made to ensure accuracy, no guarantees can be provided that every mistake has been identified and corrected by editing.     Electronically signed by Nivia Cabezas MD on 2/22/2020 at 6:26 PM

## 2020-02-21 NOTE — PATIENT INSTRUCTIONS
Patient Education        Learning About Low-Carbohydrate Diets for Weight Loss  What is a low-carbohydrate diet? Low-carb diets avoid foods that are high in carbohydrate. These high-carb foods include pasta, bread, rice, cereal, fruits, and starchy vegetables. Instead, these diets usually have you eat foods that are high in fat and protein. Many people lose weight quickly on a low-carb diet. But the early weight loss is water. People on this diet often gain the weight back after they start eating carbs again. Not all diet plans are safe or work well. A lot of the evidence shows that low-carb diets aren't healthy. That's because these diets often don't include healthy foods like fruits and vegetables. Losing weight safely means balancing protein, fat, and carbs with every meal and snack. And low-carb diets don't always provide the vitamins, minerals, and fiber you need. If you have a serious medical condition, talk to your doctor before you try any diet. These conditions include kidney disease, heart disease, type 2 diabetes, high cholesterol, and high blood pressure. If you are pregnant, it may not be safe for your baby if you are on a low-carb diet. How can you lose weight safely? You might have heard that a diet plan helped another person lose weight. But that doesn't mean that it will work for you. It is very hard to stay on a diet that includes lots of big changes in your eating habits. If you want to get to a healthy weight and stay there, making healthy lifestyle changes will often work better than dieting. These steps can help. · Make a plan for change. Work with your doctor to create a plan that is right for you. · See a dietitian. He or she can show you how to make healthy changes in your eating habits. · Manage stress. If you have a lot of stress in your life, it can be hard to focus on making healthy changes to your daily habits. · Track your food and activity.  You are likely to do better at losing weight if you keep track of what you eat and what you do. Follow-up care is a key part of your treatment and safety. Be sure to make and go to all appointments, and call your doctor if you are having problems. It's also a good idea to know your test results and keep a list of the medicines you take. Where can you learn more? Go to [email or web address] and sign in to your [employer] account. Enter A121 in the [employer] box to learn more about \"Learning About Low-Carbohydrate Diets for Weight Loss. \"     If you do not have an account, please click on the \"Sign Up Now\" link. Current as of: August 21, 2019  Content Version: 12.3  © 1822-5133 [employer]wise, Inc[employer]. Care instructions adapted under license by [facility] ([facility]). If you have questions about a medical condition or this instruction, always ask your healthcare professional. [address] any warranty or liability for your use of this information.

## 2020-02-22 PROBLEM — M17.0 BILATERAL PRIMARY OSTEOARTHRITIS OF KNEE: Status: ACTIVE | Noted: 2020-02-22

## 2020-02-22 PROBLEM — N63.0 BREAST LUMP IN FEMALE: Status: RESOLVED | Noted: 2019-03-19 | Resolved: 2020-02-22

## 2020-02-22 PROBLEM — G47.10 HYPERSOMNIA: Status: RESOLVED | Noted: 2019-12-19 | Resolved: 2020-02-22

## 2020-02-22 PROBLEM — I10 LABILE ESSENTIAL HYPERTENSION: Status: RESOLVED | Noted: 2019-12-19 | Resolved: 2020-02-22

## 2020-02-22 PROBLEM — R06.00 DYSPNEA: Status: RESOLVED | Noted: 2019-12-05 | Resolved: 2020-02-22

## 2020-02-22 PROBLEM — F33.3 SEVERE EPISODE OF RECURRENT MAJOR DEPRESSIVE DISORDER, WITH PSYCHOTIC FEATURES (HCC): Status: ACTIVE | Noted: 2018-03-06

## 2020-02-22 PROBLEM — E66.01 SEVERE OBESITY (BMI 35.0-39.9) WITH COMORBIDITY (HCC): Status: ACTIVE | Noted: 2018-03-06

## 2020-02-22 PROBLEM — R42 VERTIGO: Status: RESOLVED | Noted: 2019-12-05 | Resolved: 2020-02-22

## 2020-03-04 ENCOUNTER — HOSPITAL ENCOUNTER (OUTPATIENT)
Age: 50
Discharge: HOME | End: 2020-03-04
Payer: MEDICARE

## 2020-03-04 ENCOUNTER — HOSPITAL ENCOUNTER (OUTPATIENT)
Age: 50
Discharge: HOME | End: 2020-03-06
Payer: MEDICARE

## 2020-03-04 ENCOUNTER — HOSPITAL ENCOUNTER (OUTPATIENT)
Dept: GENERAL RADIOLOGY | Age: 50
Discharge: HOME | End: 2020-03-06
Payer: MEDICARE

## 2020-03-04 LAB
ANION GAP SERPL CALCULATED.3IONS-SCNC: 11 MMOL/L (ref 9–17)
BUN BLDV-MCNC: 13 MG/DL (ref 6–20)
BUN/CREAT BLD: NORMAL (ref 9–20)
CALCIUM SERPL-MCNC: 9.7 MG/DL (ref 8.6–10.4)
CHLORIDE BLD-SCNC: 105 MMOL/L (ref 98–107)
CHOLESTEROL/HDL RATIO: 3.2
CHOLESTEROL: 170 MG/DL
CO2: 24 MMOL/L (ref 20–31)
CREAT SERPL-MCNC: 0.65 MG/DL (ref 0.5–0.9)
GFR AFRICAN AMERICAN: >60 ML/MIN
GFR NON-AFRICAN AMERICAN: >60 ML/MIN
GFR SERPL CREATININE-BSD FRML MDRD: NORMAL ML/MIN/{1.73_M2}
GFR SERPL CREATININE-BSD FRML MDRD: NORMAL ML/MIN/{1.73_M2}
GLUCOSE BLD-MCNC: 82 MG/DL (ref 70–99)
HDLC SERPL-MCNC: 53 MG/DL
LDL CHOLESTEROL: 78 MG/DL (ref 0–130)
POTASSIUM SERPL-SCNC: 4.4 MMOL/L (ref 3.7–5.3)
SODIUM BLD-SCNC: 140 MMOL/L (ref 135–144)
TRIGL SERPL-MCNC: 194 MG/DL
TSH SERPL DL<=0.05 MIU/L-ACNC: 0.53 MIU/L (ref 0.3–5)
VLDLC SERPL CALC-MCNC: ABNORMAL MG/DL (ref 1–30)

## 2020-03-04 PROCEDURE — 80048 BASIC METABOLIC PNL TOTAL CA: CPT

## 2020-03-04 PROCEDURE — 73562 X-RAY EXAM OF KNEE 3: CPT

## 2020-03-04 PROCEDURE — 36415 COLL VENOUS BLD VENIPUNCTURE: CPT

## 2020-03-04 PROCEDURE — 84443 ASSAY THYROID STIM HORMONE: CPT

## 2020-03-04 PROCEDURE — 80061 LIPID PANEL: CPT

## 2020-03-10 ENCOUNTER — OFFICE VISIT (OUTPATIENT)
Dept: NEUROLOGY | Age: 50
End: 2020-03-10
Payer: MEDICARE

## 2020-03-10 VITALS
BODY MASS INDEX: 34.91 KG/M2 | SYSTOLIC BLOOD PRESSURE: 114 MMHG | HEART RATE: 76 BPM | HEIGHT: 63 IN | WEIGHT: 197 LBS | DIASTOLIC BLOOD PRESSURE: 78 MMHG

## 2020-03-10 PROCEDURE — 99204 OFFICE O/P NEW MOD 45 MIN: CPT | Performed by: NURSE PRACTITIONER

## 2020-03-10 PROCEDURE — 1036F TOBACCO NON-USER: CPT | Performed by: NURSE PRACTITIONER

## 2020-03-10 PROCEDURE — G8417 CALC BMI ABV UP PARAM F/U: HCPCS | Performed by: NURSE PRACTITIONER

## 2020-03-10 PROCEDURE — G8427 DOCREV CUR MEDS BY ELIG CLIN: HCPCS | Performed by: NURSE PRACTITIONER

## 2020-03-10 PROCEDURE — G8482 FLU IMMUNIZE ORDER/ADMIN: HCPCS | Performed by: NURSE PRACTITIONER

## 2020-03-10 RX ORDER — GABAPENTIN 600 MG/1
600 TABLET ORAL 3 TIMES DAILY
Qty: 90 TABLET | Refills: 5 | Status: SHIPPED | OUTPATIENT
Start: 2020-03-10 | End: 2020-05-16 | Stop reason: DRUGHIGH

## 2020-03-10 NOTE — LETTER
The pain is more pronounced with movement and walking. There is nothing except significantly makes it go away. There are no other associated signs and symptoms with the neuropathic pain.,  But it is compounded by bilateral knee pain from moderate osteoarthritis. She has been seen by orthopedics in the past who did arthroscopic surgery as well as steroid injections without relief. The patient is also having symptoms in her hands more so on the left than the right. She did declines any numbness tingling or pain, but does have some diminished dexterity and weakness of her left hand primarily in the . The patient denies any bowel or bladder difficulties. 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    Kidney stones     [address]cular dystrophy [address] [address]     Renal disease 2016    Seasonal allergies     Vertigo 2019        PAST SURGICAL HISTORY:         Procedure Laterality Date    BREAST BIOPSY Bilateral     CHOLECYSTECTOMY, LAPAROSCOPIC  16    Dr Dian Draper OF UTERUS      ENDOMETRIAL ABLATION  10/19/2016    hysteroscopy;  [facility]    INGUINAL HERNIA REPAIR      right    KIDNEY REMOVAL      1 kidneys removed    KNEE ARTHROSCOPY Left     KNEE ARTHROSCOPY Right 2016    TUBAL LIGATION  1994        SOCIAL HISTORY:     Social History     Socioeconomic History    Marital status:      Spouse name: Not on file    Number of children: Not on file    Years of education: Not on file    Highest education level: Not on file   Occupational History    Not on file   Social Needs    Financial resource strain: Not on file    Food insecurity Worry: Not on file     Inability: Not on file    Transportation needs     Medical: Not on file     Non-medical: Not on file   Tobacco Use    Smoking status: Never Smoker    Smokeless tobacco: Never Used   Substance and Sexual Activity    Alcohol use: Yes     Comment: social    Drug use: No     Comment: + THC states she had one marijuna cookie    Sexual activity: Yes     Partners: Male     Birth control/protection: Surgical     Comment: TL   Lifestyle    Physical activity     Days per week: Not on file     Minutes per session: Not on file    Stress: Not on file   Relationships    Social connections     Talks on phone: Not on file     Gets together: Not on file     Attends Jain service: Not on file     Active member of club or organization: Not on file     Attends meetings of clubs or organizations: Not on file     Relationship status: Not on file    Intimate partner violence     Fear of current or ex partner: Not on file     Emotionally abused: Not on file     Physically abused: Not on file     Forced sexual activity: Not on file   Other Topics Concern    Not on file   Social History Narrative    Not on file       CURRENT MEDICATIONS:     Current Outpatient Medications   Medication Sig Dispense Refill    gabapentin (NEURONTIN) 600 MG tablet Take 1 tablet by mouth 3 times daily for 30 days. 90 tablet 5    DULoxetine (CYMBALTA) 30 MG extended release capsule Take 1 capsule by mouth daily Call for refills or dose adjustment.  30 capsule 0    lisinopril (PRINIVIL;ZESTRIL) 10 MG tablet Take 1 tablet by mouth daily 90 tablet 3    carvedilol (COREG) 6.25 MG tablet Take 1 tablet by mouth 2 times daily (with meals) 180 tablet 3    amLODIPine (NORVASC) 10 MG tablet Take 1 tablet by mouth every evening 90 tablet 3    baclofen (LIORESAL) 10 MG tablet Take 1 tablet by mouth 3 times daily as needed (MUSCLE SPASMS) Causes sedation, do not drive while taking this medication 90 tablet 0  busPIRone (BUSPAR) 10 MG tablet take 1 tablet by mouth twice a day 60 tablet 5    SUMAtriptan (IMITREX) 50 MG tablet Take 1 tablet by mouth once as needed for Migraine May repeat in 30 minutes. 9 tablet 3    fluticasone (FLONASE) 50 MCG/ACT nasal spray fluticasone propionate 50 mcg/actuation nasal spray,suspension      ammonium lactate (AMLACTIN) 12 % cream ammonium lactate 12 % topical cream      Blood Pressure KIT Dx: HTN. Needs automatic blood pressure machine to monitor her blood pressure. 1 kit 0    Loratadine (CLARITIN) 10 MG CAPS Take 10 mg by mouth daily       naproxen (NAPROSYN) 500 MG tablet Take 1 tablet by mouth 2 times daily (with meals) (Patient not taking: Reported on 3/10/2020) 60 tablet 3     No current facility-administered medications for this visit.          ALLERGIES:     Allergies   Allergen Reactions    Penicillins Nausea And Vomiting                          REVIEW OF SYSTEMS    CONSTITUTIONAL Weight: present, Appetite: present, Fatigue: present      HEENT Ears: normal, Visual disturbance: absent   RESPIRATORY Shortness of breath: absent, Cough: absent   CARDIOVASCULAR Chest pain: absent, Leg swelling :present      GI Constipation: present, Diarrhea: present, Swallowing change: absent       Urinary frequency: absent, Urinary urgency: absent,    MUSCULOSKELETAL Neck pain: absent, Back pain: present, Stiffness: present, Muscle pain: present, Joint pain: present Restless legs: present   DERMATOLOGIC Hair loss: absent, Skin changes: absent   NEUROLOGIC Memory loss: present, Confusion: absent, Seizures: absent Trouble walking or imbalance: present, Dizziness: present, Weakness: present, Numbness: absent Tremor: absent, Spasm: absent, Speech difficulty: absent, Headache: present, Light sensitivity: absent   PSYCHIATRIC Anxiety: present, Hallucination: absent, Mood disorder: present   HEMATOLOGIC Abnormal bleeding: absent, Anemia: absent,            PHYSICAL EXAMINATION:       Vitals: neuropathic pain, gait instability, hand weakness more so on the left than the right and chronic knee pain likely due to her gait abnormality. 1. Increase gabapentin to 600 mg 3 times daily. 2. Continue Cymbalta at 30 mg daily  3. Patient to continue to wear bilateral AFO braces  4. Physical therapy recommended, however the patient is reluctant at this time. 5. Continue to monitor for any progression of her disease process  6. Patient to return back in 3 months for an evaluation of the increased dosage of gabapentin    Signed: Greg Baig CNP  Please note that this chart was generated using voice recognition Dragon dictation software. Although every effort was made to ensure the accuracy of this automated transcription, some errors in transcription may have occurred. If you have questions, please do not hesitate to call me. I look forward to following Sushma along with you.     Sincerely,        YARI Richards - CNP

## 2020-03-10 NOTE — PROGRESS NOTES
flexion: 5/5  Left wrist flexion: 4/5  Right wrist extension: 5/5  Left wrist extension: 4/5  Right interossei: 5/5  Left interossei: 4/5  Right iliopsoas: 5/5  Left iliopsoas: 5/5  Right quadriceps: 5/5  Left quadriceps: 5/5  Right hamstrin/5  Left hamstrin/5  Right anterior tibial: 0/5  Left anterior tibial: 2/5  Right gastroc: 4/5  Left gastroc: 4/5    Sensory Exam   Right arm light touch: decreased from wrist  Left arm light touch: decreased from wrist  Right leg light touch: decreased from knee  Left leg light touch: decreased from knee  Right leg vibration: decreased from ankle  Left leg vibration: decreased from ankle  Right leg proprioception: decreased from ankle  Left leg proprioception: decreased from ankle  Right leg pinprick: decreased from knee  Left leg pinprick: decreased from knee    Gait, Coordination, and Reflexes     Gait  Gait: wide-based    Coordination   Finger to nose coordination: normal  Heel to shin coordination: normal    Tremor   Resting tremor: absent  Intention tremor: absent  Action tremor: absent    Reflexes   Right brachioradialis: 0  Left brachioradialis: 0  Right biceps: 0  Left biceps: 0  Right triceps: 0  Left triceps: 0  Right patellar: 0  Left patellar: 0  Right achilles: 0  Left achilles: 0  Right plantar: equivocal  Left plantar: equivocal  Right ankle clonus: absent  Left ankle clonus: absent        ASSESSMENT AND PLAN:       Thank you very much for the kind referral of [id number]. I look forward to working with you in the neurological care of your patient. 1. 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 1. Increase gabapentin to 600 mg 3 times daily. 2. Continue Cymbalta at 30 mg daily  3. Patient to continue to wear bilateral AFO braces  4.  Physical therapy recommended, however the patient

## 2020-03-16 RX ORDER — DULOXETIN HYDROCHLORIDE 30 MG/1
CAPSULE, DELAYED RELEASE ORAL
Qty: 30 CAPSULE | Refills: 3 | Status: SHIPPED | OUTPATIENT
Start: 2020-03-16 | End: 2020-05-15 | Stop reason: SDUPTHER

## 2020-03-23 RX ORDER — SUMATRIPTAN 50 MG/1
TABLET, FILM COATED ORAL
Qty: 9 TABLET | Refills: 3 | Status: SHIPPED | OUTPATIENT
Start: 2020-03-23 | End: 2020-04-13 | Stop reason: SDUPTHER

## 2020-03-23 NOTE — TELEPHONE ENCOUNTER
Please Approve or Refuse.   Send to Pharmacy per Pt's Request:      Next Visit Date:  5/15/2020   Last Visit Date: 2/21/2020    Hemoglobin A1C (%)   Date Value   04/22/2019 4.7   03/20/2018 5.1   07/08/2016 4.6             ( goal A1C is < 7)   BP Readings from Last 3 Encounters:   03/10/20 114/78   02/21/20 102/70   01/02/20 100/70          (goal 120/80)  BUN   Date Value Ref Range Status   03/04/2020 13 6 - 20 mg/dL Final     CREATININE   Date Value Ref Range Status   03/04/2020 0.65 0.50 - 0.90 mg/dL Final     Potassium   Date Value Ref Range Status   03/04/2020 4.4 3.7 - 5.3 mmol/L Final

## 2020-03-31 RX ORDER — MECLIZINE HCL 12.5 MG/1
12.5 TABLET ORAL 3 TIMES DAILY PRN
Qty: 90 TABLET | Refills: 0 | Status: SHIPPED | OUTPATIENT
Start: 2020-03-31 | End: 2020-04-30

## 2020-04-13 RX ORDER — SUMATRIPTAN 50 MG/1
TABLET, FILM COATED ORAL
Qty: 9 TABLET | Refills: 3 | Status: SHIPPED | OUTPATIENT
Start: 2020-04-13 | End: 2020-06-22 | Stop reason: SDUPTHER

## 2020-04-17 RX ORDER — NAPROXEN 500 MG/1
500 TABLET ORAL 2 TIMES DAILY WITH MEALS
Qty: 60 TABLET | Refills: 3 | Status: SHIPPED | OUTPATIENT
Start: 2020-04-17 | End: 2020-06-18 | Stop reason: SINTOL

## 2020-05-15 ENCOUNTER — TELEMEDICINE (OUTPATIENT)
Dept: FAMILY MEDICINE CLINIC | Age: 50
End: 2020-05-15
Payer: MEDICARE

## 2020-05-15 ENCOUNTER — TELEPHONE (OUTPATIENT)
Dept: FAMILY MEDICINE CLINIC | Age: 50
End: 2020-05-15

## 2020-05-15 VITALS — HEIGHT: 63 IN | BODY MASS INDEX: 33.66 KG/M2 | WEIGHT: 190 LBS

## 2020-05-15 PROBLEM — F33.2 SEVERE EPISODE OF RECURRENT MAJOR DEPRESSIVE DISORDER, WITHOUT PSYCHOTIC FEATURES (HCC): Status: ACTIVE | Noted: 2020-05-15

## 2020-05-15 PROCEDURE — G8431 POS CLIN DEPRES SCRN F/U DOC: HCPCS | Performed by: FAMILY MEDICINE

## 2020-05-15 PROCEDURE — 99214 OFFICE O/P EST MOD 30 MIN: CPT | Performed by: FAMILY MEDICINE

## 2020-05-15 PROCEDURE — G8427 DOCREV CUR MEDS BY ELIG CLIN: HCPCS | Performed by: FAMILY MEDICINE

## 2020-05-15 PROCEDURE — G8510 SCR DEP NEG, NO PLAN REQD: HCPCS | Performed by: FAMILY MEDICINE

## 2020-05-15 RX ORDER — DULOXETIN HYDROCHLORIDE 60 MG/1
60 CAPSULE, DELAYED RELEASE ORAL DAILY
Qty: 90 CAPSULE | Refills: 3 | Status: SHIPPED | OUTPATIENT
Start: 2020-05-15 | End: 2020-10-28 | Stop reason: SDUPTHER

## 2020-05-15 RX ORDER — BUSPIRONE HYDROCHLORIDE 30 MG/1
30 TABLET ORAL 2 TIMES DAILY
Qty: 180 TABLET | Refills: 3 | Status: SHIPPED | OUTPATIENT
Start: 2020-05-15 | End: 2020-10-28 | Stop reason: SDUPTHER

## 2020-05-15 RX ORDER — MULTIVIT-MIN/IRON FUM/FOLIC AC 7.5 MG-4
1 TABLET ORAL DAILY
Qty: 90 TABLET | Refills: 3 | Status: SHIPPED | OUTPATIENT
Start: 2020-05-15 | End: 2021-02-08 | Stop reason: ALTCHOICE

## 2020-05-15 RX ORDER — GABAPENTIN 800 MG/1
800 TABLET ORAL 3 TIMES DAILY
Qty: 90 TABLET | Refills: 0 | Status: SHIPPED | OUTPATIENT
Start: 2020-05-15 | End: 2020-10-28 | Stop reason: SDUPTHER

## 2020-05-15 RX ORDER — GABAPENTIN 800 MG/1
800 TABLET ORAL DAILY
Qty: 30 TABLET | Refills: 0 | Status: SHIPPED | OUTPATIENT
Start: 2020-05-15 | End: 2020-05-15 | Stop reason: DRUGHIGH

## 2020-05-15 ASSESSMENT — ENCOUNTER SYMPTOMS
VOMITING: 0
ABDOMINAL DISTENTION: 0
DIARRHEA: 0
BACK PAIN: 1
CONSTIPATION: 0
NAUSEA: 0
ABDOMINAL PAIN: 0
COUGH: 0
SHORTNESS OF BREATH: 0
WHEEZING: 0
CHEST TIGHTNESS: 0

## 2020-05-15 ASSESSMENT — PATIENT HEALTH QUESTIONNAIRE - PHQ9
SUM OF ALL RESPONSES TO PHQ9 QUESTIONS 1 & 2: 6
SUM OF ALL RESPONSES TO PHQ QUESTIONS 1-9: 22
1. LITTLE INTEREST OR PLEASURE IN DOING THINGS: 3
4. FEELING TIRED OR HAVING LITTLE ENERGY: 3
10. IF YOU CHECKED OFF ANY PROBLEMS, HOW DIFFICULT HAVE THESE PROBLEMS MADE IT FOR YOU TO DO YOUR WORK, TAKE CARE OF THINGS AT HOME, OR GET ALONG WITH OTHER PEOPLE: 2
5. POOR APPETITE OR OVEREATING: 3
9. THOUGHTS THAT YOU WOULD BE BETTER OFF DEAD, OR OF HURTING YOURSELF: 0
8. MOVING OR SPEAKING SO SLOWLY THAT OTHER PEOPLE COULD HAVE NOTICED. OR THE OPPOSITE, BEING SO FIGETY OR RESTLESS THAT YOU HAVE BEEN MOVING AROUND A LOT MORE THAN USUAL: 1
3. TROUBLE FALLING OR STAYING ASLEEP: 3
SUM OF ALL RESPONSES TO PHQ QUESTIONS 1-9: 22
7. TROUBLE CONCENTRATING ON THINGS, SUCH AS READING THE NEWSPAPER OR WATCHING TELEVISION: 3
6. FEELING BAD ABOUT YOURSELF - OR THAT YOU ARE A FAILURE OR HAVE LET YOURSELF OR YOUR FAMILY DOWN: 3
2. FEELING DOWN, DEPRESSED OR HOPELESS: 3

## 2020-05-15 NOTE — TELEPHONE ENCOUNTER
pharmacist called about Gabapentin , they received a script for 800 mg Qd , patient was on 600 mg TID from her neurologist?  Pharmacist is asking if this is addition to 600 mg or are you changing it ?   Please Advice

## 2020-05-15 NOTE — PROGRESS NOTES
5/15/2020    TELEHEALTH EVALUATION -- Audio/Visual (During FPLSO-86 public health emergency)    HPI:    Emmanuel Dsouza (:  1970) has requested an audio/video evaluation for the following concern(s):Depression; Pain (Charcot [place] tooth disease); Knee Pain (Bilaterally); and Back Pain    Patient has known Charcot-Shanel-Tooth muscular dystrophy  Intensity of pain is 9/10, worse in the back and knees. Patient has chronic low back pain, radiates both sides, but to the left side more often, worse with activities. X-ray lumbar spine 10/31/2016 showed strengthening and mild curvature with slight degenerative changes    She did go to physical therapy but the sessions were too expensive and she cannot afford physical therapy. I did refer her to PMR, and she does have appointment. She does use leg braces for foot drop bilaterally. Patient reports knees hurting a lot, has knee braces but cannot use due to the size, while using the leg braces she has for the muscular dystrophy  Tried a prescription creams before and it did not help  Her mom has Charcot Shanel muscular dystrophy  I did refer the patient to PMR as well due to foot drop bilaterally, with abnormality of the gait. Patient usually falls due to tripping over things due to her bilateral foot drop. Patient did have x-rays bilateral knees 3/4/2020 showing moderate degenerative disease worse on the right side. Patient feels her pain is getting worse, gabapentin 600 mgx3 times a day does not help. She recently established with new neurologist.    Depression and anxiety is worsening  Patient says that recently her Ex  was found dead on May 5. She has a daughter 26 yo and son 31 yo. She watches 3yo grandson    Taking duloxetine  30 mg and BuSpar, but she would like the dosage is increased    Denies suicidal ideation, plan or intent. Denies hallucinations.     PHQ-2 Over the past 2 weeks, how often have you been bothered by any of the following problems? Little interest or pleasure in doing things: Nearly every day  Feeling down, depressed, or hopeless: Nearly every day  PHQ-2 Score: 6  PHQ-9 Over the past 2 weeks, how often have you been bothered by any of the following problems? Trouble falling or staying asleep, or sleeping too much: Nearly every day  Feeling tired or having little energy: Nearly every day  Poor appetite or overeating: Nearly every day  Feeling bad about yourself - or that you are a failure or have let yourself or your family down: Nearly every day  Trouble concentrating on things, such as reading the newspaper or watching television: Nearly every day  Moving or speaking so slowly that other people could have noticed. Or the opposite - being so fidgety or restless that you have been moving around a lot more than usual: Several days  Thoughts that you would be better off dead, or of hurting yourself in some way: Not at all  If you checked off any problems, how difficult have these problems made it for you to do your work, take care of things at home, or get along with other people?: Very difficult  PHQ-9 Total Score: 22    Has been losing weight, unintentional, 12 pounds in 3 months  Wt Readings from Last 3 Encounters:   05/15/20 190 lb (86.2 kg)   03/10/20 197 lb (89.4 kg)   02/21/20 202 lb (91.6 kg)     Patient also reports feeling anxious all the time, not able to stop or control worrying, worrying too much about different things, having trouble relaxing, sometimes being restless, becoming easily annoyed and irritable, worrying a lot and having difficulty falling asleep. Severe depression worsening   PHQ Scores 5/15/2020 2/21/2020 1/2/2020 11/21/2019 4/22/2019 3/6/2018 1/16/2018   PHQ2 Score 6 6 0 0 4 3 6   PHQ9 Score 22 21 0 0 19 11 18   Review of Systems   Constitutional: Positive for fatigue and unexpected weight change. Negative for activity change, appetite change, chills, diaphoresis and fever.    Respiratory: Negative for cough, chest tightness, shortness of breath and wheezing. Cardiovascular: Negative for chest pain, palpitations and leg swelling. Gastrointestinal: Negative for abdominal distention, abdominal pain, constipation, diarrhea, nausea and vomiting. Endocrine: Negative for cold intolerance, heat intolerance, polydipsia, polyphagia and polyuria. Musculoskeletal: Positive for arthralgias (knees), back pain, gait problem and myalgias. Neurological: Positive for weakness (legs , foot drop b/l). Hematological: Does not bruise/bleed easily. Psychiatric/Behavioral: Positive for decreased concentration, dysphoric mood, hallucinations and sleep disturbance. Negative for self-injury and suicidal ideas. The patient is nervous/anxious. Prior to Visit Medications    Medication Sig Taking?  Authorizing Provider   naproxen (NAPROSYN) 500 MG tablet Take 1 tablet by mouth 2 times daily (with meals) Yes Nicole Iyer MD   SUMAtriptan (IMITREX) 50 MG tablet take 1 tablet by mouth if needed AT ONSET OF HEADACHE may repeat in 30 MINUTES IF headache PERSISTS Yes Nicole Iyer MD   DULoxetine (CYMBALTA) 30 MG extended release capsule take 1 capsule by mouth once daily Yes Nicole Iyer MD   lisinopril (PRINIVIL;ZESTRIL) 10 MG tablet Take 1 tablet by mouth daily Yes Nicole Iyer MD   carvedilol (COREG) 6.25 MG tablet Take 1 tablet by mouth 2 times daily (with meals) Yes Nicole Iyer MD   amLODIPine (NORVASC) 10 MG tablet Take 1 tablet by mouth every evening Yes Nicole Iyer MD   baclofen (LIORESAL) 10 MG tablet Take 1 tablet by mouth 3 times daily as needed (MUSCLE SPASMS) Causes sedation, do not drive while taking this medication Yes Nicole Iyer MD   busPIRone (BUSPAR) 10 MG tablet take 1 tablet by mouth twice a day Yes Nicole Iyer MD   fluticasone (FLONASE) 50 MCG/ACT nasal spray fluticasone propionate 50 mcg/actuation nasal spray,suspension Yes Historical

## 2020-05-15 NOTE — PATIENT INSTRUCTIONS
not an emergency. I feel uncomfortable, but I am not in danger. I can keep going even if I feel anxious. \"  · Be kind to your body:  ? Relieve tension with exercise or a massage. ? Get enough rest.  ? Avoid alcohol, caffeine, nicotine, and illegal drugs. They can increase your anxiety level and cause sleep problems. ? Learn and do relaxation techniques. See below for more about these techniques. · Engage your mind. Get out and do something you enjoy. Go to a funny movie, or take a walk or hike. Plan your day. Having too much or too little to do can make you anxious. · Keep a record of your symptoms. Discuss your fears with a good friend or family member, or join a support group for people with similar problems. Talking to others sometimes relieves stress. · Get involved in social groups, or volunteer to help others. Being alone sometimes makes things seem worse than they are. · Get at least 30 minutes of exercise on most days of the week to relieve stress. Walking is a good choice. You also may want to do other activities, such as running, swimming, cycling, or playing tennis or team sports. Relaxation techniques  Do relaxation exercises 10 to 20 minutes a day. You can play soothing, relaxing music while you do them, if you wish. · Tell others in your house that you are going to do your relaxation exercises. Ask them not to disturb you. · Find a comfortable place, away from all distractions and noise. · Lie down on your back, or sit with your back straight. · Focus on your breathing. Make it slow and steady. · Breathe in through your nose. Breathe out through either your nose or mouth. · Breathe deeply, filling up the area between your navel and your rib cage. Breathe so that your belly goes up and down. · Do not hold your breath. · Breathe like this for 5 to 10 minutes. Notice the feeling of calmness throughout your whole body.   As you continue to breathe slowly and deeply, relax by doing the any painful motion. 3. Slowly lower your leg back to the floor. 4. Do 8 to 12 repetitions. 5. With permission from your doctor or physical therapist, you may also want to add a cuff weight to your ankle (not more than 5 pounds). With weight, you do not have to lift your leg more than 12 inches to get a hamstring workout. Shallow standing knee bends   1. Stand with your hands lightly resting on a counter or chair in front of you. Put your feet shoulder-width apart. 2. Slowly bend your knees so that you squat down like you are going to sit in a chair. Make sure your knees do not go in front of your toes. 3. Lower yourself about 6 inches. Your heels should remain on the floor at all times. 4. Rise slowly to a standing position. Heel raises   1. Stand with your feet a few inches apart, with your hands lightly resting on a counter or chair in front of you. 2. Slowly raise your heels off the floor while keeping your knees straight. 3. Hold for about 6 seconds, then slowly lower your heels to the floor. 4. Do 8 to 12 repetitions several times during the day. Follow-up care is a key part of your treatment and safety. Be sure to make and go to all appointments, and call your doctor if you are having problems. It's also a good idea to know your test results and keep a list of the medicines you take. Where can you learn more? Go to https://[employer]. org and sign in to your [employer] account. Enter L626 in the [facility] box to learn more about \"Knee: Exercises. \"     If you do not have an account, please click on the \"Sign Up Now\" link. Current as of: June 26, 2019Content Version: 12.4  © 6421-3780 Healthwise, Incorporated. Care instructions adapted under license by [facility] ([facility]).  If you have questions about a medical condition or this instruction, always ask your healthcare professional. [address] any warranty or liability for your use of this

## 2020-05-26 ENCOUNTER — VIRTUAL VISIT (OUTPATIENT)
Dept: PSYCHOLOGY | Age: 50
End: 2020-05-26
Payer: MEDICARE

## 2020-05-26 PROCEDURE — 90791 PSYCH DIAGNOSTIC EVALUATION: CPT | Performed by: SOCIAL WORKER
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
## 2020-05-27 ENCOUNTER — TELEMEDICINE (OUTPATIENT)
Dept: PSYCHIATRY | Age: 50
End: 2020-05-27
Payer: MEDICARE

## 2020-05-27 PROCEDURE — 99205 OFFICE O/P NEW HI 60 MIN: CPT | Performed by: PSYCHIATRY & NEUROLOGY

## 2020-05-27 PROCEDURE — G8427 DOCREV CUR MEDS BY ELIG CLIN: HCPCS | Performed by: PSYCHIATRY & NEUROLOGY
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
## 2020-05-27 NOTE — PROGRESS NOTES
Value Date    TSH 0.53 03/04/2020      Lab Results   Component Value Date    CHOL 170 03/04/2020    CHOL 182 01/12/2018    CHOL 202 (H) 12/16/2016     Lab Results   Component Value Date    TRIG 194 (H) 03/04/2020    TRIG 138 01/12/2018    TRIG 162 (H) 12/16/2016     Lab Results   Component Value Date    HDL 53 03/04/2020    HDL 52 01/12/2018    HDL 60 12/16/2016     Lab Results   Component Value Date    LDLCHOLESTEROL 78 03/04/2020    LDLCHOLESTEROL 102 01/12/2018    LDLCHOLESTEROL 110 12/16/2016     Lab Results   Component Value Date    VLDL NOT REPORTED (H) 03/04/2020    VLDL NOT REPORTED 01/12/2018    VLDL NOT REPORTED 12/16/2016     Lab Results   Component Value Date    CHOLHDLRATIO 3.2 03/04/2020    CHOLHDLRATIO 3.5 01/12/2018    CHOLHDLRATIO 3.4 12/16/2016      Lab Results   Component Value Date     03/04/2020    BUN 13 03/04/2020    CREATININE 0.65 03/04/2020    TSH 0.53 03/04/2020    WBC 7.2 12/02/2019      No results found for: PHENYTOIN, PHENOBARB, VALPROATE, CBMZ     Vitals:  BP      Temp      Pulse     Resp      SpO2          Current Medications:   Current Outpatient Medications   Medication Sig Dispense Refill    diclofenac sodium (VOLTAREN) 1 % GEL Apply 2 g topically 4 times daily as needed for Pain 1 Tube 3    busPIRone (BUSPAR) 30 MG tablet Take 30 mg by mouth 2 times daily 180 tablet 3    DULoxetine (CYMBALTA) 60 MG extended release capsule Take 1 capsule by mouth daily Dose increased 5/15/2020 90 capsule 3    Multiple Vitamins-Minerals (MULTIVITAMIN WITH MINERALS) tablet Take 1 tablet by mouth daily OK to substitute 90 tablet 3    gabapentin (NEURONTIN) 800 MG tablet Take 1 tablet by mouth 3 times daily for 30 days.  **changed from 600 mg to 800 mg** 90 tablet 0    naproxen (NAPROSYN) 500 MG tablet Take 1 tablet by mouth 2 times daily (with meals) 60 tablet 3    SUMAtriptan (IMITREX) 50 MG tablet take 1 tablet by mouth if needed AT ONSET OF HEADACHE may repeat in 30 MINUTES IF headache PERSISTS 9 tablet 3    lisinopril (PRINIVIL;ZESTRIL) 10 MG tablet Take 1 tablet by mouth daily 90 tablet 3    carvedilol (COREG) 6.25 MG tablet Take 1 tablet by mouth 2 times daily (with meals) 180 tablet 3    amLODIPine (NORVASC) 10 MG tablet Take 1 tablet by mouth every evening 90 tablet 3    baclofen (LIORESAL) 10 MG tablet Take 1 tablet by mouth 3 times daily as needed (MUSCLE SPASMS) Causes sedation, do not drive while taking this medication 90 tablet 0    fluticasone (FLONASE) 50 MCG/ACT nasal spray fluticasone propionate 50 mcg/actuation nasal spray,suspension      ammonium lactate (AMLACTIN) 12 % cream ammonium lactate 12 % topical cream      Blood Pressure KIT Dx: HTN. Needs automatic blood pressure machine to monitor her blood pressure. 1 kit 0    Loratadine (CLARITIN) 10 MG CAPS Take 10 mg by mouth daily        No current facility-administered medications for this visit. PDMP Monitoring:    Last PDMP Lucy Peters as Reviewed [facility]):  Review User Review Instant Review Result   Elizabeth Pate 5/15/2020  2:27 PM Reviewed PDMP [1]     Last Controlled Substance Monitoring Documentation      Telemedicine from 5/15/2020 in [facility]   Periodic Controlled Substance Monitoring  Possible medication side effects, risk of tolerance/dependence & alternative treatments discussed., No signs of potential drug abuse or diversion identified. , Assessed functional status. filed at 05/15/2020 1427        Urine Drug Screenings (1 yr)     DRUG SCREEN, PAIN  Collected: 3/15/2018 10:00 AM (Final result)    Complete Results              Medication Contract and Consent for Opioid Use Documents Filed      No documents found                Mental Status Exam:  Patient is alert and oriented to time , place , person and self. She  is cooperative with fair eye contact and fair grooming . Appears of stated age . Mood is ' down '  . Affect is congruent .    Thought process is linear and goal directed
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
## 2020-05-28 ENCOUNTER — TELEPHONE (OUTPATIENT)
Dept: PSYCHIATRY | Age: 50
End: 2020-05-28

## 2020-06-18 ENCOUNTER — INITIAL CONSULT (OUTPATIENT)
Dept: PHYSICAL MEDICINE AND REHAB | Age: 50
End: 2020-06-18
Payer: MEDICARE

## 2020-06-18 VITALS
TEMPERATURE: 97.8 F | DIASTOLIC BLOOD PRESSURE: 69 MMHG | BODY MASS INDEX: 33.13 KG/M2 | HEART RATE: 87 BPM | SYSTOLIC BLOOD PRESSURE: 95 MMHG | HEIGHT: 63 IN | WEIGHT: 187 LBS

## 2020-06-18 PROCEDURE — G8417 CALC BMI ABV UP PARAM F/U: HCPCS | Performed by: PHYSICAL MEDICINE & REHABILITATION

## 2020-06-18 PROCEDURE — 20552 NJX 1/MLT TRIGGER POINT 1/2: CPT | Performed by: PHYSICAL MEDICINE & REHABILITATION

## 2020-06-18 PROCEDURE — 1036F TOBACCO NON-USER: CPT | Performed by: PHYSICAL MEDICINE & REHABILITATION

## 2020-06-18 PROCEDURE — 99204 OFFICE O/P NEW MOD 45 MIN: CPT | Performed by: PHYSICAL MEDICINE & REHABILITATION

## 2020-06-18 PROCEDURE — G8427 DOCREV CUR MEDS BY ELIG CLIN: HCPCS | Performed by: PHYSICAL MEDICINE & REHABILITATION

## 2020-06-18 RX ORDER — LIDOCAINE HYDROCHLORIDE 10 MG/ML
4 INJECTION, SOLUTION INFILTRATION; PERINEURAL ONCE
Status: COMPLETED | OUTPATIENT
Start: 2020-06-18 | End: 2020-06-18

## 2020-06-18 RX ADMIN — LIDOCAINE HYDROCHLORIDE 4 ML: 10 INJECTION, SOLUTION INFILTRATION; PERINEURAL at 15:02

## 2020-06-18 NOTE — PROGRESS NOTES
anxiety. Objective:     Physical Exam  BP 95/69   Pulse 87   Temp 97.8 °F (36.6 °C) (Temporal)   Ht 5' 3\" (1.6 m)   Wt 187 lb (84.8 kg)   BMI 33.13 kg/m²   Constitutional: She is in no distress. She appears well-developed and well-nourished. HEENT:  Normocephalic. EOMI. PERRL. Mucous membranes pink and moist.   Pulmonary/Chest: Respirations WNL and unlabored. Neurological: She is alert and oriented to person, place, and time. She has DTRs 2+ L patella, and 1+ R patella. Sensation intact to light touch. Skin: Skin is warm, dry and intact with good turgor. Psychiatric: She has a normal mood and affect. Her behavior is normal.Thought content normal.   MSK: No visible abnormalities lumbar or thoracic spine. Visible R knee effusion compared to L knee. No visible muscle atrophy B distal LEs. Palpable muscle spasm/tightness B lower thoracic/upper lumbar paraspinal muscles.    Tenderness:      Back Thoracic and Lumbar     Greater Trochanter Negative       Range of Motion:      Back Flexion: Normal     Lumbar Rotation Normal     Lumbar Lateral Bend Normal     Back Extension: Decreased       Tests      Matthew's:            Right negative                             Left negative     Slump:            Right negative                             Left negative     SLR:                Right Negative                             Left Negative   Facet Loading:  Right full range with pain                             Left full range with pain       Muscle Strength RIGHT     Abductor: 5/5     Adductor: 5/5     Quadriceps: 5/5     Hamstrings: 5/5     Iliopsoas: 5/5     Dorsiflexion: 1/5     Plantarflexion: 1/5     EHL: 1/5       Muscle Strength LEFT     Abductor: 5/5     Adductor: 5/5     Quadriceps: 5/5     Hamstrings: 5/5     Iliopsoas: 5/5     Dorsiflexion: 1/5     Plantarflexion: 1/5     EHL:  1/5       Reflexes RIGHT    Patellar: 1+    Achilles:        Reflexes LEFT     Patellar: 2+     Achilles:         Sensation: Normal   Gait: Antalgic   Toe Walk: abnormal   Heel Walk: abnormal     Knee Assessment:  Gait:  abnormal        If abnormal gait: antalgic   Swelling: Left: absent  Right:  present       Varicosities: Left: Absent   Right: Absent        Erythma/Scars/Rash: Left:  Absent   Right:  Absent          Warmth:  Left:  Absent   Right:  Absent        Crepitus:  Left: Present  Right: Present                                Joint Line Pain:  Left: Absent   Right: Absent                                    If present:  Left: If present:                  Right:        Stress Pain:  Negative bilaterally on varus/valgus stress testing       Lachman:  Left:  negative  With endpoint: Yes  Without endpoint: No    Right:  negative  With endpoint: Yes  Without endpoint: No       Anterior drawer:  Left: Not Indicated  Right: Not Indicated       Sven's Test: Left: Negative  Right: Negative       Patella Ballotment Test: Left: negative  Right: negative       Neuro Exam:    Flexion extension Left: 5/5  Right: 5/5   Sensory Intact: Left: Yes  Right: Yes                     EXTREMITIES: No edema. No calf tenderness to palpation bilaterally. Nursing note and vitals reviewed. Imaging:  None new    PROCEDURE:  Trigger Point Procedural Note    Indication: Severe pain and pain control    Procedure: 8 Trigger Points were identified in the B thoracic/lumbar paraspinal medications. The patient was placed in the appropriate position and the area over the trigger point was prepped with iodine and alcohol. Injection was performed into the trigger point area using 0.5 ml Lidocaine 1% into each of the 8 trigger point(s) followed by dry needling. The injection site was covered with a bandage. Complications: None, patient tolerated procedure well. Assessment:       Diagnosis Orders   1. Charcot Shanel Tooth muscular atrophy     2. Bilateral foot-drop     3. Trigger point of thoracic region     4.  Bilateral primary osteoarthritis of knee

## 2020-06-22 RX ORDER — SUMATRIPTAN 50 MG/1
TABLET, FILM COATED ORAL
Qty: 9 TABLET | Refills: 3 | Status: SHIPPED | OUTPATIENT
Start: 2020-06-22 | End: 2021-02-08

## 2020-06-22 NOTE — TELEPHONE ENCOUNTER
Please Approve or Refuse.   Send to Pharmacy per Pt's Request:      Next Visit Date:  7/24/2020   Last Visit Date: 2/21/2020    Hemoglobin A1C (%)   Date Value   04/22/2019 4.7   03/20/2018 5.1   07/08/2016 4.6             ( goal A1C is < 7)   BP Readings from Last 3 Encounters:   06/18/20 95/69   03/10/20 114/78   02/21/20 102/70          (goal 120/80)  BUN   Date Value Ref Range Status   03/04/2020 13 6 - 20 mg/dL Final     CREATININE   Date Value Ref Range Status   03/04/2020 0.65 0.50 - 0.90 mg/dL Final     Potassium   Date Value Ref Range Status   03/04/2020 4.4 3.7 - 5.3 mmol/L Final

## 2020-06-25 ENCOUNTER — PROCEDURE VISIT (OUTPATIENT)
Dept: PHYSICAL MEDICINE AND REHAB | Age: 50
End: 2020-06-25
Payer: MEDICARE

## 2020-06-25 VITALS — TEMPERATURE: 97.8 F | BODY MASS INDEX: 33.13 KG/M2 | WEIGHT: 187 LBS | HEIGHT: 63 IN

## 2020-06-25 PROCEDURE — 20610 DRAIN/INJ JOINT/BURSA W/O US: CPT | Performed by: PHYSICAL MEDICINE & REHABILITATION

## 2020-06-25 RX ORDER — LIDOCAINE HYDROCHLORIDE 10 MG/ML
9 INJECTION, SOLUTION INFILTRATION; PERINEURAL ONCE
Status: COMPLETED | OUTPATIENT
Start: 2020-06-25 | End: 2020-06-25

## 2020-06-25 RX ORDER — TRIAMCINOLONE ACETONIDE 40 MG/ML
40 INJECTION, SUSPENSION INTRA-ARTICULAR; INTRAMUSCULAR ONCE
Status: COMPLETED | OUTPATIENT
Start: 2020-06-25 | End: 2020-06-25

## 2020-06-25 RX ADMIN — LIDOCAINE HYDROCHLORIDE 9 ML: 10 INJECTION, SOLUTION INFILTRATION; PERINEURAL at 16:56

## 2020-06-25 RX ADMIN — TRIAMCINOLONE ACETONIDE 40 MG: 40 INJECTION, SUSPENSION INTRA-ARTICULAR; INTRAMUSCULAR at 16:58

## 2020-06-25 NOTE — PROGRESS NOTES
level: None   Occupational History    None   Social Needs    Financial resource strain: None    Food insecurity     Worry: None     Inability: None    Transportation needs     Medical: None     Non-medical: None   Tobacco Use    Smoking status: Never Smoker    Smokeless tobacco: Never Used   Substance and Sexual Activity    Alcohol use: Yes     Comment: social    Drug use: No     Comment: + THC states she had one marijuna cookie    Sexual activity: Yes     Partners: Male     Birth control/protection: Surgical     Comment: TL   Lifestyle    Physical activity     Days per week: None     Minutes per session: None    Stress: None   Relationships    Social connections     Talks on phone: None     Gets together: None     Attends Adventism service: None     Active member of club or organization: None     Attends meetings of clubs or organizations: None     Relationship status: None    Intimate partner violence     Fear of current or ex partner: None     Emotionally abused: None     Physically abused: None     Forced sexual activity: None   Other Topics Concern    None   Social History Narrative    None       Current Outpatient Medications   Medication Sig Dispense Refill    SUMAtriptan (IMITREX) 50 MG tablet take 1 tablet by mouth if needed AT ONSET OF HEADACHE may repeat in 30 MINUTES IF headache PERSISTS 9 tablet 3    diclofenac (SOLARAZE) 3 % gel Compound: diclofenac3%+baclofen3%+dmso5%+gabapentin6%+lidocaine2%+prilocaine2%. Apply 1-2g topically to affected area TID-QID.  120 g 2    busPIRone (BUSPAR) 30 MG tablet Take 30 mg by mouth 2 times daily 180 tablet 3    DULoxetine (CYMBALTA) 60 MG extended release capsule Take 1 capsule by mouth daily Dose increased 5/15/2020 90 capsule 3    Multiple Vitamins-Minerals (MULTIVITAMIN WITH MINERALS) tablet Take 1 tablet by mouth daily OK to substitute 90 tablet 3    lisinopril (PRINIVIL;ZESTRIL) 10 MG tablet Take 1 tablet by mouth daily 90 tablet 3    carvedilol (COREG) 6.25 MG tablet Take 1 tablet by mouth 2 times daily (with meals) 180 tablet 3    amLODIPine (NORVASC) 10 MG tablet Take 1 tablet by mouth every evening 90 tablet 3    baclofen (LIORESAL) 10 MG tablet Take 1 tablet by mouth 3 times daily as needed (MUSCLE SPASMS) Causes sedation, do not drive while taking this medication 90 tablet 0    fluticasone (FLONASE) 50 MCG/ACT nasal spray fluticasone propionate 50 mcg/actuation nasal spray,suspension      ammonium lactate (AMLACTIN) 12 % cream ammonium lactate 12 % topical cream      Blood Pressure KIT Dx: HTN. Needs automatic blood pressure machine to monitor her blood pressure. 1 kit 0    Loratadine (CLARITIN) 10 MG CAPS Take 10 mg by mouth daily       Misc. Devices MISC 1 each by Does not apply route once as needed (self directed deep tissue massage) Please dispense one theracane device. Massage back as tolerated to relieve muscle spasms. 1 Device 0    gabapentin (NEURONTIN) 800 MG tablet Take 1 tablet by mouth 3 times daily for 30 days. **changed from 600 mg to 800 mg** 90 tablet 0     No current facility-administered medications for this visit. Allergies   Allergen Reactions    Penicillins Nausea And Vomiting       Subjective:      Review of Systems  Constitutional: Negative for fever, chills and unexpected weight change. HENT: Negative for trouble swallowing. Respiratory: Negative for cough and shortness of breath. Cardiovascular: Negative for chest pain. Endocrine: Negative for polyuria. Genitourinary: Negative for dysuria, urgency, frequency, incontinence and difficulty urinating. Gastrointestinal: Negative for constipation or diarrhea. Musculoskeletal: Positive for arthralgias. Neurological: Negative for headaches, numbness, or tingling. Psychiatric: Negative for depressed mood or anxiety.        Objective:     Physical Exam  Temp 97.8 °F (36.6 °C) (Temporal)   Ht 5' 3\" (1.6 m)   Wt 187 lb (84.8 kg)   BMI 33.13 PM    Assessment:       Diagnosis Orders   1. Bilateral primary osteoarthritis of knee          Plan: Will follow up with phone visit in 4 weeks. Consider orthopedic referral if no improvement with today's injection. She may need new referral as she is not sure her previous orthopedic surgeon is still practicing in the area. No orders of the defined types were placed in this encounter. No orders of the defined types were placed in this encounter. Return in about 4 weeks (around 7/23/2020) for phone visit follow up . Electronically signedby Howard Ruiz MD on 6/25/2020 at 4:40 PM.     Please note that this chartwas generated using voice recognition Dragon dictation software. Although everyeffort was made to ensure the accuracy of this automated transcription, some errorsin transcription may have occurred.

## 2020-07-06 ENCOUNTER — APPOINTMENT (OUTPATIENT)
Dept: GENERAL RADIOLOGY | Age: 50
End: 2020-07-06
Payer: MEDICARE

## 2020-07-06 ENCOUNTER — HOSPITAL ENCOUNTER (EMERGENCY)
Age: 50
Discharge: HOME | End: 2020-07-06
Attending: EMERGENCY MEDICINE
Payer: MEDICARE

## 2020-07-06 VITALS
BODY MASS INDEX: 34.73 KG/M2 | WEIGHT: 196 LBS | OXYGEN SATURATION: 99 % | HEIGHT: 63 IN | SYSTOLIC BLOOD PRESSURE: 108 MMHG | TEMPERATURE: 97.5 F | DIASTOLIC BLOOD PRESSURE: 75 MMHG | RESPIRATION RATE: 18 BRPM | HEART RATE: 89 BPM

## 2020-07-06 PROCEDURE — 99283 EMERGENCY DEPT VISIT LOW MDM: CPT

## 2020-07-06 PROCEDURE — 73610 X-RAY EXAM OF ANKLE: CPT

## 2020-07-06 PROCEDURE — 6370000000 HC RX 637 (ALT 250 FOR IP): Performed by: STUDENT IN AN ORGANIZED HEALTH CARE EDUCATION/TRAINING PROGRAM

## 2020-07-06 RX ORDER — ACETAMINOPHEN 325 MG/1
650 TABLET ORAL EVERY 4 HOURS PRN
Status: DISCONTINUED | OUTPATIENT
Start: 2020-07-06 | End: 2020-07-06 | Stop reason: HOSPADM

## 2020-07-06 RX ORDER — IBUPROFEN 400 MG/1
400 TABLET ORAL EVERY 6 HOURS PRN
Qty: 120 TABLET | Refills: 0 | Status: SHIPPED | OUTPATIENT
Start: 2020-07-06 | End: 2021-03-08 | Stop reason: SDUPTHER

## 2020-07-06 RX ORDER — IBUPROFEN 400 MG/1
400 TABLET ORAL EVERY 6 HOURS PRN
Status: DISCONTINUED | OUTPATIENT
Start: 2020-07-06 | End: 2020-07-06 | Stop reason: HOSPADM

## 2020-07-06 RX ORDER — ACETAMINOPHEN 325 MG/1
325 TABLET ORAL EVERY 6 HOURS PRN
Qty: 120 TABLET | Refills: 0 | Status: SHIPPED | OUTPATIENT
Start: 2020-07-06

## 2020-07-06 RX ADMIN — ACETAMINOPHEN 650 MG: 325 TABLET ORAL at 16:26

## 2020-07-06 RX ADMIN — IBUPROFEN 400 MG: 400 TABLET, FILM COATED ORAL at 16:26

## 2020-07-06 ASSESSMENT — ENCOUNTER SYMPTOMS
NAUSEA: 0
ABDOMINAL DISTENTION: 0
RHINORRHEA: 0
CHEST TIGHTNESS: 0
TROUBLE SWALLOWING: 0
SORE THROAT: 0
SHORTNESS OF BREATH: 0
CONSTIPATION: 0
VOMITING: 0
WHEEZING: 0
DIARRHEA: 0
PHOTOPHOBIA: 0
BACK PAIN: 0
ABDOMINAL PAIN: 0

## 2020-07-06 ASSESSMENT — PAIN DESCRIPTION - FREQUENCY: FREQUENCY: CONTINUOUS

## 2020-07-06 ASSESSMENT — PAIN SCALES - GENERAL
PAINLEVEL_OUTOF10: 10
PAINLEVEL_OUTOF10: 10

## 2020-07-06 ASSESSMENT — PAIN DESCRIPTION - ORIENTATION: ORIENTATION: RIGHT

## 2020-07-06 ASSESSMENT — PAIN DESCRIPTION - LOCATION: LOCATION: ANKLE

## 2020-07-06 ASSESSMENT — PAIN DESCRIPTION - PROGRESSION: CLINICAL_PROGRESSION: NOT CHANGED

## 2020-07-06 ASSESSMENT — PAIN DESCRIPTION - DESCRIPTORS: DESCRIPTORS: TENDER;THROBBING;DISCOMFORT

## 2020-07-06 NOTE — ED PROVIDER NOTES
[facility] ED  Emergency Department Encounter  Emergency Medicine Resident     Pt Name: Varun Morrissey  MRN: [id number]  [place] 1970  Date of evaluation: 20  PCP:  Willie Martínez MD    [address] [place] [address]       Chief Complaint   Patient presents with    Ankle Pain     1400, Jumped into pool and heard/felt a crack; Pain/swelling ever since       HISTORY OFPRESENT ILLNESS  (Location/Symptom, Timing/Onset, Context/Setting, Quality, Duration, Modifying Factors,Severity.)      Varun Morrissey is a 52 yo female who presents with acute onset right ankle pain secondary to an injury which he sustained at 2 PM earlier today. Patient jumped into a pool landing hard on her right ankle and felt a cracking sound. Patient has a past medical history of muscular dystrophy and has decreased sensation and muscle tone bilateral lower extremity at baseline, states that although she does not feel overt pain she feels an acute change in sensation with persistent numbness of the lateral and medial ankle on the right side. Patient denies any other trauma, did not lose consciousness, did not strike her head, has no pain to left lower extremity, no pain at the knee, hip. Patient denies lightheadedness, dizziness shortness of breath, chest pain nausea, vomiting, change in bowel or bladder function    PAST MEDICAL / SURGICAL / SOCIAL / FAMILY HISTORY      has a past medical history of Angina at rest [facility]), Arthritis, Charcot-Shanel-Tooth disease, Depression, History of , History of miscarriage, History of tubal ligation, Hypertension, Kidney anomaly, congenital, Kidney stones, Muscular dystrophy ([facility] [address] Renal disease, Seasonal allergies, and Vertigo. has a past surgical history that includes Inguinal hernia repair; Kidney removal; Cholecystectomy, laparoscopic (16); Tubal ligation (1994); Knee arthroscopy (Left); Knee arthroscopy (Right, 2016); Breast biopsy (Bilateral);  Dilation and curettage of uterus; and Endometrial ablation (10/19/2016).      Social History     Socioeconomic History    Marital status:      Spouse name: Not on file    Number of children: Not on file    Years of education: Not on file    Highest education level: Not on file   Occupational History    Not on file   Social Needs    Financial resource strain: Not on file    Food insecurity     Worry: Not on file     Inability: Not on file    Transportation needs     Medical: Not on file     Non-medical: Not on file   Tobacco Use    Smoking status: Never Smoker    Smokeless tobacco: Never Used   Substance and Sexual Activity    Alcohol use: Yes     Comment: social    Drug use: No     Comment: + THC states she had one marijuna cookie    Sexual activity: Yes     Partners: Male     Birth control/protection: Surgical     Comment: TL   Lifestyle    Physical activity     Days per week: Not on file     Minutes per session: Not on file    Stress: Not on file   Relationships    Social connections     Talks on phone: Not on file     Gets together: Not on file     Attends Confucianism service: Not on file     Active member of club or organization: Not on file     Attends meetings of clubs or organizations: Not on file     Relationship status: Not on file    Intimate partner violence     Fear of current or ex partner: Not on file     Emotionally abused: Not on file     Physically abused: Not on file     Forced sexual activity: Not on file   Other Topics Concern    Not on file   Social History Narrative    Not on file       Family History   Problem Relation Age of Onset    Other Mother         muscular dystrophy    High Blood Pressure Mother     Cirrhosis Father     Alcohol Abuse Father     COPD Father     Hypertension Father     Seizures Brother     Diabetes Brother     Cirrhosis Maternal Grandfather     Alcohol Abuse Maternal Grandfather     Alzheimer's Disease Paternal Grandmother     Other Paternal Grandfather         suicide       Allergies:  Penicillins    Home Medications:  Prior to Admission medications    Medication Sig Start Date End Date Taking? Authorizing Provider   acetaminophen (TYLENOL) 325 MG tablet Take 1 tablet by mouth every 6 hours as needed for Pain 7/6/20  Yes Donis Pelaez MD   ibuprofen (IBU) 400 MG tablet Take 1 tablet by mouth every 6 hours as needed for Pain 7/6/20  Yes Donis Pelaez MD   SUMAtriptan (IMITREX) 50 MG tablet take 1 tablet by mouth if needed  Holderrieth Buckeye may repeat in 30 MINUTES IF headache PERSISTS 6/22/20   Teodoro So MD   diclofenac (SOLARAZE) 3 % gel Compound: diclofenac3%+baclofen3%+dmso5%+gabapentin6%+lidocaine2%+prilocaine2%. Apply 1-2g topically to affected area TID-QID. 6/18/20   Ra Jasmine MD   Misc. Devices MISC 1 each by Does not apply route once as needed (self directed deep tissue massage) Please dispense one theracane device. Massage back as tolerated to relieve muscle spasms. 6/18/20 6/18/20  Ra Jasmine MD   busPIRone (BUSPAR) 30 MG tablet Take 30 mg by mouth 2 times daily 5/15/20   Teodoro So MD   DULoxetine (CYMBALTA) 60 MG extended release capsule Take 1 capsule by mouth daily Dose increased 5/15/2020 5/15/20   Teodoro So MD   Multiple Vitamins-Minerals (MULTIVITAMIN WITH MINERALS) tablet Take 1 tablet by mouth daily OK to substitute 5/15/20   Teodoro So MD   gabapentin (NEURONTIN) 800 MG tablet Take 1 tablet by mouth 3 times daily for 30 days.  **changed from 600 mg to 800 mg** 5/15/20 6/14/20  Teodoro So MD   lisinopril (PRINIVIL;ZESTRIL) 10 MG tablet Take 1 tablet by mouth daily 1/24/20   Teodoro So MD   carvedilol (COREG) 6.25 MG tablet Take 1 tablet by mouth 2 times daily (with meals) 1/24/20   Teodoro So MD   amLODIPine (NORVASC) 10 MG tablet Take 1 tablet by mouth every evening 1/24/20   Teodoro So MD   baclofen (LIORESAL) 10 MG tablet Take 1 tablet by mouth 3 times daily as needed (MUSCLE SPASMS) Causes sedation, do not drive while taking this medication 1/24/20   Myna Frankel, MD   fluticasone (FLONASE) 50 MCG/ACT nasal spray fluticasone propionate 50 mcg/actuation nasal spray,suspension    Historical Provider, MD   ammonium lactate (AMLACTIN) 12 % cream ammonium lactate 12 % topical cream    Historical Provider, MD   Blood Pressure KIT Dx: HTN. Needs automatic blood pressure machine to monitor her blood pressure. 12/5/19   YARI Coronado - CNP   Loratadine (CLARITIN) 10 MG CAPS Take 10 mg by mouth daily     Historical Provider, MD       REVIEW OFSYSTEMS    (2-9 systems for level 4, 10 or more for level 5)      Review of Systems   Constitutional: Negative for chills, fatigue and fever. HENT: Negative for hearing loss, rhinorrhea, sneezing, sore throat, tinnitus and trouble swallowing. Eyes: Negative for photophobia and visual disturbance. Respiratory: Negative for chest tightness, shortness of breath and wheezing. Cardiovascular: Negative for chest pain and palpitations. Gastrointestinal: Negative for abdominal distention, abdominal pain, constipation, diarrhea, nausea and vomiting. Endocrine: Negative for polyuria. Genitourinary: Negative for dysuria, flank pain, frequency, vaginal bleeding and vaginal discharge. Musculoskeletal: Positive for arthralgias (Right ankle). Negative for back pain, gait problem and neck pain. Neurological: Negative for dizziness, tremors, seizures, syncope, facial asymmetry, numbness and headaches. Psychiatric/Behavioral: Negative for self-injury and suicidal ideas.        PHYSICAL EXAM   (up to 7 for level 4, 8 or more forlevel 5)      INITIAL VITALS:   ED Triage Vitals   BP Temp Temp Source Pulse Resp SpO2 Height Weight   07/06/20 1527 07/06/20 1516 07/06/20 1516 07/06/20 1527 07/06/20 1527 07/06/20 1527 07/06/20 1527 07/06/20 1527   108/75 97.8 °F (36.6 °C) Oral 89 18 99 % 5' 3\" (1.6 m) 196 lb (88.9 kg)       Physical Exam  Constitutional:       General: She is not in acute distress. Appearance: She is obese. She is not toxic-appearing or diaphoretic. HENT:      Head: Normocephalic and atraumatic. Eyes:      Extraocular Movements: Extraocular movements intact. Neck:      Musculoskeletal: No neck rigidity or muscular tenderness. Cardiovascular:      Rate and Rhythm: Normal rate and regular rhythm. Pulses: Normal pulses. Pulmonary:      Effort: No respiratory distress. Breath sounds: Normal breath sounds. No wheezing. Abdominal:      General: There is no distension. Palpations: Abdomen is soft. Tenderness: There is no abdominal tenderness. Musculoskeletal:         General: Tenderness (Tenderness to palpation of the right ankle) present. Comments: Patient has baseline limited range of motion in the bilateral lower extremity secondary to muscular dystrophy. Skin:     General: Skin is dry. Neurological:      General: No focal deficit present. Mental Status: She is oriented to person, place, and time. Psychiatric:         Mood and Affect: Mood normal.         Behavior: Behavior normal.         Thought Content:  Thought content normal.         Judgment: Judgment normal.         DIFFERENTIAL  DIAGNOSIS     PLAN (LABS / IMAGING / EKG):  Orders Placed This Encounter   Procedures    XR ANKLE RIGHT (MIN 3 VIEWS)       MEDICATIONS ORDERED:  Orders Placed This Encounter   Medications    acetaminophen (TYLENOL) tablet 650 mg    ibuprofen (ADVIL;MOTRIN) tablet 400 mg    acetaminophen (TYLENOL) 325 MG tablet     Sig: Take 1 tablet by mouth every 6 hours as needed for Pain     Dispense:  120 tablet     Refill:  0    ibuprofen (IBU) 400 MG tablet     Sig: Take 1 tablet by mouth every 6 hours as needed for Pain     Dispense:  120 tablet     Refill:  0       DDX: Ankle fracture, ankle sprain, lateral malleolus fracture, avulsion fracture, talus fracture, tib-fib facture, tubular fracture, tibial fracture. Initial MDM/Plan: 53 y.o. female who presents with isolated right ankle pain in the setting of a jump into a pool in which he suffered an eversion injury based on mechanism. Patient has a history of muscular dystrophy at baseline. Patient is neurovascular intact distal to injury, is unable to wiggle her toes bilaterally however this is normal for her baseline. Plan to get an x-ray to assess for acute pathology. Patient has no lacerations or cuts which her tetanus would need to be updated. Patient has bilateral leg braces secondary to muscular dystrophy. Patient states her pain currently is at approximately 10 out of 10 but describes it is more of a numbing sensation, plan to treat with Tylenol, ibuprofen. DIAGNOSTIC RESULTS / EMERGENCYDEPARTMENT COURSE / MDM     LABS:  Labs Reviewed - No data to display      RADIOLOGY:  Xr Ankle Right (min 3 Views)    Result Date: 7/6/2020  EXAMINATION: THREE XRAY VIEWS OF THE RIGHT ANKLE 7/6/2020 5:06 pm COMPARISON: None. HISTORY: ORDERING SYSTEM PROVIDED HISTORY: eversion injury jumping into pool, history of muscular dystrophy TECHNOLOGIST PROVIDED HISTORY: eversion injury jumping into pool, history of muscular dystrophy Reason for Exam: pt unable to dorsiflex foot Acuity: Acute Type of Exam: Initial FINDINGS: Study somewhat limited secondary to patient's ability to position for exam. No acute fracture or dislocation is noted. Soft tissue swelling is noted over the ankle. No definite joint effusion noted. Soft tissue swelling with no definite acute osseous abnormality given limitations of exam.  If pain persists a repeat study could always be obtained in 7-10 days.          EMERGENCY DEPARTMENT COURSE:    X-ray negative for acute pathology, patient updated on imaging results, told to follow-up in 1 week with primary care provider for potential repeat imaging, told to come back if she has any acute change in the

## 2020-07-07 ENCOUNTER — TELEPHONE (OUTPATIENT)
Dept: FAMILY MEDICINE CLINIC | Age: 50
End: 2020-07-07

## 2020-07-07 NOTE — ED PROVIDER NOTES
[facility] ED  Emergency Department  Faculty Attestation       I performed a history and physical examination of the patient and discussed management with the resident. I reviewed the residents note and agree with the documented findings including all diagnostic interpretations and plan of care. Any areas of disagreement are noted on the chart. I was personally present for the key portions of any procedures. I have documented in the chart those procedures where I was not present during the key portions. I have reviewed the emergency nurses triage note. I agree with the chief complaint, past medical history, past surgical history, allergies, medications, social and family history as documented unless otherwise noted below. Documentation of the HPI, Physical Exam and Medical Decision Making performed by [email or web address] is based on my personal performance of the HPI, PE and MDM. For Physician Assistant/ Nurse Practitioner cases/documentation I have personally evaluated this patient and have completed at least one if not all key elements of the E/M (history, physical exam, and MDM). Additional findings are as noted. Pertinent Comments     Primary Care Physician: Lashonda Powell MD    ED Triage Vitals   BP Temp Temp Source Pulse Resp SpO2 Height Weight   07/06/20 1527 07/06/20 1516 07/06/20 1516 07/06/20 1527 07/06/20 1527 07/06/20 1527 07/06/20 1527 07/06/20 1527   108/75 97.8 °F (36.6 °C) Oral 89 18 99 % 5' 3\" (1.6 m) 196 lb (88.9 kg)        History/Physical: This is a 53 y.o. female who presents to the Emergency Department with complaint of right ankle pain. Patient is a history of muscular dystrophy. Had an eversion injury. On exam, is chronic not positioning of the foot and ankle. Has tenderness over the anterior portion of the lateral malleolus on my exam.  Pulses intact. Sensation intact normal cap refill    MDM/Plan: Sprain versus bony injury. X-ray.     X-ray negative however did recommend following up in 7 days for repeat imaging of continued pain. Shanti Haskins for d/c     PPE:  Patient was screened and has no clinical signs or symptoms of a CoVID-19 infection at this time. However, given current pandemic and atypical presentations, face mask, eye protection, and gloves were worn during examination.         CRITICAL CARE: None     Partha Stanley MD  Attending Emergency Physician         Partha Stanley MD  07/07/20 [id number]

## 2020-07-07 NOTE — TELEPHONE ENCOUNTER
[facility] ([facility]) ED Follow up Call    Reason for ED visit:  ANKLE PAIN     7/7/2020     Aries Ordaz , this is CAMILO   from Dr. Reynaga Bachelor office, just calling to see how you are doing after your recent ED visit. Did you receive discharge instructions? Yes  Do you understand the discharge instructions? Yes  Did the ED give you any new prescriptions? Yes  Were you able to fill your prescriptions? Yes      Do you have one of our red, yellow and green  Zone sheets that help you to determine when you should go to the ED? NO       Do you need or want to make a follow up appt with your PCP? No    Do you have any further needs in the home, e.g. equipment?   No        FU appts/Provider:    Future Appointments   Date Time Provider Dom Trejo   7/22/2020  4:00 PM Sherrie Neal MD PHYS MED [facility]   7/24/2020 11:00 AM [facility]

## 2020-07-08 ENCOUNTER — APPOINTMENT (OUTPATIENT)
Dept: GENERAL RADIOLOGY | Age: 50
End: 2020-07-08
Payer: MEDICARE

## 2020-07-08 ENCOUNTER — HOSPITAL ENCOUNTER (EMERGENCY)
Age: 50
Discharge: HOME | End: 2020-07-08
Attending: EMERGENCY MEDICINE
Payer: MEDICARE

## 2020-07-08 ENCOUNTER — APPOINTMENT (OUTPATIENT)
Dept: CT IMAGING | Age: 50
End: 2020-07-08
Payer: MEDICARE

## 2020-07-08 VITALS
OXYGEN SATURATION: 99 % | DIASTOLIC BLOOD PRESSURE: 86 MMHG | HEIGHT: 63 IN | SYSTOLIC BLOOD PRESSURE: 146 MMHG | TEMPERATURE: 98.4 F | WEIGHT: 196 LBS | BODY MASS INDEX: 34.73 KG/M2 | RESPIRATION RATE: 18 BRPM | HEART RATE: 94 BPM

## 2020-07-08 LAB
-: ABNORMAL
ABSOLUTE EOS #: 0.28 K/UL (ref 0–0.44)
ABSOLUTE IMMATURE GRANULOCYTE: 0.06 K/UL (ref 0–0.3)
ABSOLUTE LYMPH #: 1.68 K/UL (ref 1.1–3.7)
ABSOLUTE MONO #: 0.86 K/UL (ref 0.1–1.2)
ALBUMIN SERPL-MCNC: 4.2 G/DL (ref 3.5–5.2)
ALBUMIN/GLOBULIN RATIO: 1.6 (ref 1–2.5)
ALP BLD-CCNC: 96 U/L (ref 35–104)
ALT SERPL-CCNC: 14 U/L (ref 5–33)
AMORPHOUS: ABNORMAL
ANION GAP SERPL CALCULATED.3IONS-SCNC: 13 MMOL/L (ref 9–17)
AST SERPL-CCNC: 15 U/L
BACTERIA: ABNORMAL
BASOPHILS # BLD: 1 % (ref 0–2)
BASOPHILS ABSOLUTE: 0.08 K/UL (ref 0–0.2)
BILIRUB SERPL-MCNC: 0.39 MG/DL (ref 0.3–1.2)
BILIRUBIN URINE: NEGATIVE
BUN BLDV-MCNC: 15 MG/DL (ref 6–20)
BUN/CREAT BLD: ABNORMAL (ref 9–20)
CALCIUM SERPL-MCNC: 9.3 MG/DL (ref 8.6–10.4)
CASTS UA: ABNORMAL /LPF (ref 0–8)
CHLORIDE BLD-SCNC: 101 MMOL/L (ref 98–107)
CO2: 26 MMOL/L (ref 20–31)
COLOR: YELLOW
COMMENT UA: ABNORMAL
CREAT SERPL-MCNC: 0.7 MG/DL (ref 0.5–0.9)
CRYSTALS, UA: ABNORMAL /HPF
DIFFERENTIAL TYPE: ABNORMAL
EOSINOPHILS RELATIVE PERCENT: 3 % (ref 1–4)
EPITHELIAL CELLS UA: ABNORMAL /HPF (ref 0–5)
GFR AFRICAN AMERICAN: >60 ML/MIN
GFR NON-AFRICAN AMERICAN: >60 ML/MIN
GFR SERPL CREATININE-BSD FRML MDRD: ABNORMAL ML/MIN/{1.73_M2}
GFR SERPL CREATININE-BSD FRML MDRD: ABNORMAL ML/MIN/{1.73_M2}
GLUCOSE BLD-MCNC: 104 MG/DL (ref 70–99)
GLUCOSE URINE: NEGATIVE
HCG QUALITATIVE: NEGATIVE
HCT VFR BLD CALC: 46.6 % (ref 36.3–47.1)
HEMOGLOBIN: 14.8 G/DL (ref 11.9–15.1)
IMMATURE GRANULOCYTES: 1 %
INR BLD: 0.9
KETONES, URINE: NEGATIVE
LEUKOCYTE ESTERASE, URINE: ABNORMAL
LYMPHOCYTES # BLD: 18 % (ref 24–43)
MCH RBC QN AUTO: 28.8 PG (ref 25.2–33.5)
MCHC RBC AUTO-ENTMCNC: 31.8 G/DL (ref 28.4–34.8)
MCV RBC AUTO: 90.8 FL (ref 82.6–102.9)
MONOCYTES # BLD: 9 % (ref 3–12)
MUCUS: ABNORMAL
NITRITE, URINE: NEGATIVE
NRBC AUTOMATED: 0 PER 100 WBC
OTHER OBSERVATIONS UA: ABNORMAL
PARTIAL THROMBOPLASTIN TIME: 25.8 SEC (ref 20.5–30.5)
PDW BLD-RTO: 13.6 % (ref 11.8–14.4)
PH UA: 7 (ref 5–8)
PLATELET # BLD: 292 K/UL (ref 138–453)
PLATELET ESTIMATE: ABNORMAL
PMV BLD AUTO: 9.7 FL (ref 8.1–13.5)
POTASSIUM SERPL-SCNC: 4.4 MMOL/L (ref 3.7–5.3)
PROTEIN UA: NEGATIVE
PROTHROMBIN TIME: 9.9 SEC (ref 9–12)
RBC # BLD: 5.13 M/UL (ref 3.95–5.11)
RBC # BLD: ABNORMAL 10*6/UL
RBC UA: ABNORMAL /HPF (ref 0–4)
RENAL EPITHELIAL, UA: ABNORMAL /HPF
SEG NEUTROPHILS: 68 % (ref 36–65)
SEGMENTED NEUTROPHILS ABSOLUTE COUNT: 6.43 K/UL (ref 1.5–8.1)
SODIUM BLD-SCNC: 140 MMOL/L (ref 135–144)
SPECIFIC GRAVITY UA: 1.02 (ref 1–1.03)
TOTAL PROTEIN: 6.9 G/DL (ref 6.4–8.3)
TRICHOMONAS: ABNORMAL
TURBIDITY: ABNORMAL
URINE HGB: ABNORMAL
UROBILINOGEN, URINE: NORMAL
WBC # BLD: 9.4 K/UL (ref 3.5–11.3)
WBC # BLD: ABNORMAL 10*3/UL
WBC UA: ABNORMAL /HPF (ref 0–5)
YEAST: ABNORMAL

## 2020-07-08 PROCEDURE — 84703 CHORIONIC GONADOTROPIN ASSAY: CPT

## 2020-07-08 PROCEDURE — 85610 PROTHROMBIN TIME: CPT

## 2020-07-08 PROCEDURE — 81001 URINALYSIS AUTO W/SCOPE: CPT

## 2020-07-08 PROCEDURE — 74176 CT ABD & PELVIS W/O CONTRAST: CPT

## 2020-07-08 PROCEDURE — 99284 EMERGENCY DEPT VISIT MOD MDM: CPT

## 2020-07-08 PROCEDURE — 85025 COMPLETE CBC W/AUTO DIFF WBC: CPT

## 2020-07-08 PROCEDURE — 87086 URINE CULTURE/COLONY COUNT: CPT

## 2020-07-08 PROCEDURE — 85730 THROMBOPLASTIN TIME PARTIAL: CPT

## 2020-07-08 PROCEDURE — 80053 COMPREHEN METABOLIC PANEL: CPT

## 2020-07-08 PROCEDURE — 71046 X-RAY EXAM CHEST 2 VIEWS: CPT

## 2020-07-08 RX ORDER — SULFAMETHOXAZOLE AND TRIMETHOPRIM 800; 160 MG/1; MG/1
1 TABLET ORAL 2 TIMES DAILY
Qty: 20 TABLET | Refills: 0 | Status: SHIPPED | OUTPATIENT
Start: 2020-07-08 | End: 2020-07-18

## 2020-07-08 ASSESSMENT — PAIN DESCRIPTION - ORIENTATION: ORIENTATION: LOWER;RIGHT

## 2020-07-08 ASSESSMENT — PAIN DESCRIPTION - INTENSITY: RATING_2: 8

## 2020-07-08 ASSESSMENT — PAIN DESCRIPTION - LOCATION
LOCATION: BACK
LOCATION_2: ABDOMEN

## 2020-07-08 ASSESSMENT — PAIN DESCRIPTION - DESCRIPTORS
DESCRIPTORS_2: CRAMPING
DESCRIPTORS: ACHING

## 2020-07-08 ASSESSMENT — PAIN SCALES - GENERAL: PAINLEVEL_OUTOF10: 8

## 2020-07-08 ASSESSMENT — PAIN DESCRIPTION - PAIN TYPE: TYPE: ACUTE PAIN

## 2020-07-08 NOTE — ED PROVIDER NOTES
[facility] ED     Emergency Department     Faculty Attestation        I performed a history and physical examination of the patient and discussed management with the resident. I reviewed the residents note and agree with the documented findings and plan of care. Any areas of disagreement are noted on the chart. I was personally present for the key portions of any procedures. I have documented in the chart those procedures where I was not present during the key portions. I have reviewed the emergency nurses triage note. I agree with the chief complaint, past medical history, past surgical history, allergies, medications, social and family history as documented unless otherwise noted below. For mid-level providers such as nurse practitioners as well as physicians assistants:    I have personally seen and evaluated the patient. I find the patient's history and physical exam are consistent with NP/PA documentation. I agree with the care provided, treatment rendered, disposition, & follow-up plan. Additional findings are as noted. Vital Signs: BP (!) 146/86   Pulse 94   Temp 98.4 °F (36.9 °C) (Oral)   Resp 18   Ht 5' 3\" (1.6 m)   Wt 196 lb (88.9 kg)   SpO2 99%   BMI 34.72 kg/m²   PCP:  Russel Mireles MD    Pertinent Comments:     Presents with right flank pain after falling last night. She has 1 kidney. She denies any fevers or chills. There is no dysuria but she has noticed blood when she wipes.   She has mild CVA tenderness obtain labs, CT imaging of the pelvis, symptomatic treatment, reassessment      Critical Care  None          Ariadne Renteria MD  Attending Emergency Medicine Physician              Darnell Rivera MD  07/08/20 [id number]

## 2020-07-09 ENCOUNTER — TELEPHONE (OUTPATIENT)
Dept: FAMILY MEDICINE CLINIC | Age: 50
End: 2020-07-09

## 2020-07-09 LAB
CULTURE: NORMAL
Lab: NORMAL
SPECIMEN DESCRIPTION: NORMAL

## 2020-07-09 ASSESSMENT — ENCOUNTER SYMPTOMS
COUGH: 0
ABDOMINAL PAIN: 0
VOMITING: 0
NAUSEA: 0
SINUS PAIN: 0
DIARRHEA: 0
TROUBLE SWALLOWING: 0
EYE REDNESS: 0
SORE THROAT: 0
RHINORRHEA: 0
WHEEZING: 0
BLOOD IN STOOL: 0
CONSTIPATION: 0
EYE PAIN: 0
SINUS PRESSURE: 0
SHORTNESS OF BREATH: 0
CHEST TIGHTNESS: 0

## 2020-07-22 ENCOUNTER — VIRTUAL VISIT (OUTPATIENT)
Dept: PHYSICAL MEDICINE AND REHAB | Age: 50
End: 2020-07-22
Payer: MEDICARE

## 2020-07-22 PROCEDURE — 99441 PR PHYS/QHP TELEPHONE EVALUATION 5-10 MIN: CPT | Performed by: PHYSICAL MEDICINE & REHABILITATION

## 2020-07-22 NOTE — PROGRESS NOTES
[facility] [place] [facility]  [address] [facility],  [address]  [address] [address]  Dept: [phone]  Dept Fax: [phone]    Outpatient Followup Note - Telephone visit only during  [address], 53 y.o., female, presents for follow up c/o of Follow-up  . HPI:     HPI  Follow up phone call to evaluate effectiveness of knee injection at last visit. She reports pain has been reduced by 40-50% in R knee and is tolerable. She has had 2 falls since our last visit. One on 2020 at her daughter's pool which caused an injury to her R ankle, then one at night on 2020 when she was trying to walk down the stairs in the dark. She had ED visit both times. Diagnostic studies negative for fracture R ankle. She continues to have aching pain and swelling R ankle which is worse when she is working. 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    Kidney stones     Muscular dystrophy ([address]     Renal disease 2016    Seasonal allergies     Vertigo 2019      Past Surgical History:   Procedure Laterality Date    BREAST BIOPSY Bilateral     CHOLECYSTECTOMY, LAPAROSCOPIC  16    Dr Tiffani Jean ENDOMETRIAL ABLATION  10/19/2016    hysteroscopy;  [facility]    INGUINAL HERNIA REPAIR      right    KIDNEY REMOVAL      1 kidneys removed    KNEE ARTHROSCOPY Left     KNEE ARTHROSCOPY Right 2016    TUBAL LIGATION  1994     Family History   Problem Relation Age of Onset    Other Mother         muscular dystrophy    High Blood Pressure Mother     Cirrhosis Father     Alcohol Abuse Father    [facility] MINUTES IF headache PERSISTS 9 tablet 3    diclofenac (SOLARAZE) 3 % gel Compound: diclofenac3%+baclofen3%+dmso5%+gabapentin6%+lidocaine2%+prilocaine2%. Apply 1-2g topically to affected area TID-QID. 120 g 2    busPIRone (BUSPAR) 30 MG tablet Take 30 mg by mouth 2 times daily 180 tablet 3    DULoxetine (CYMBALTA) 60 MG extended release capsule Take 1 capsule by mouth daily Dose increased 5/15/2020 90 capsule 3    Multiple Vitamins-Minerals (MULTIVITAMIN WITH MINERALS) tablet Take 1 tablet by mouth daily OK to substitute 90 tablet 3    lisinopril (PRINIVIL;ZESTRIL) 10 MG tablet Take 1 tablet by mouth daily 90 tablet 3    carvedilol (COREG) 6.25 MG tablet Take 1 tablet by mouth 2 times daily (with meals) 180 tablet 3    amLODIPine (NORVASC) 10 MG tablet Take 1 tablet by mouth every evening 90 tablet 3    baclofen (LIORESAL) 10 MG tablet Take 1 tablet by mouth 3 times daily as needed (MUSCLE SPASMS) Causes sedation, do not drive while taking this medication 90 tablet 0    fluticasone (FLONASE) 50 MCG/ACT nasal spray fluticasone propionate 50 mcg/actuation nasal spray,suspension      ammonium lactate (AMLACTIN) 12 % cream ammonium lactate 12 % topical cream      Blood Pressure KIT Dx: HTN. Needs automatic blood pressure machine to monitor her blood pressure. 1 kit 0    Loratadine (CLARITIN) 10 MG CAPS Take 10 mg by mouth daily       Misc. Devices MISC 1 each by Does not apply route once as needed (self directed deep tissue massage) Please dispense one theracane device. Massage back as tolerated to relieve muscle spasms. 1 Device 0    gabapentin (NEURONTIN) 800 MG tablet Take 1 tablet by mouth 3 times daily for 30 days. **changed from 600 mg to 800 mg** 90 tablet 0     No current facility-administered medications for this visit.       Allergies   Allergen Reactions    Penicillins Nausea And Vomiting       Subjective:      Review of Systems  Constitutional: Negative for fever, chills and unexpected weight change. HENT: Negative for trouble swallowing. Respiratory: Negative for cough and shortness of breath. Cardiovascular: Negative for chest pain. Endocrine: Negative for polyuria. Genitourinary: Negative for dysuria, urgency, frequency, incontinence and difficulty urinating. Gastrointestinal: Negative for constipation or diarrhea. Musculoskeletal: Positive for arthralgias. Neurological: Negative for headaches, numbness, or tingling. Psychiatric: Negative for depressed mood or anxiety. Objective:     Physical Exam  There were no vitals taken for this visit. Deferred. Phone visit only    Imaging:    THREE XRAY VIEWS OF THE RIGHT ANKLE         7/6/2020 5:06 pm         COMPARISON:    None.         HISTORY:    ORDERING SYSTEM PROVIDED HISTORY: eversion injury jumping into pool, history    of muscular dystrophy    TECHNOLOGIST PROVIDED HISTORY:    eversion injury jumping into pool, history of muscular dystrophy    Reason for Exam: pt unable to dorsiflex foot    Acuity: Acute    Type of Exam: Initial         FINDINGS:    Study somewhat limited secondary to patient's ability to position for exam.         No acute fracture or dislocation is noted.         Soft tissue swelling is noted over the ankle.  No definite joint effusion    noted. Results for Harley Savage (MRN [id number]) as of 7/22/2020 16:27   Ref.  Range 7/8/2020 18:34   Sodium Latest Ref Range: 135 - 144 mmol/L 140   Potassium Latest Ref Range: 3.7 - 5.3 mmol/L 4.4   Chloride Latest Ref Range: 98 - 107 mmol/L 101   CO2 Latest Ref Range: 20 - 31 mmol/L 26   BUN Latest Ref Range: 6 - 20 mg/dL 15   Creatinine Latest Ref Range: 0.50 - 0.90 mg/dL 0.70   Bun/Cre Ratio Latest Ref Range: 9 - 20  NOT REPORTED   Anion Gap Latest Ref Range: 9 - 17 mmol/L 13   GFR Non- Latest Ref Range: >60 mL/min >60   GFR African American Latest Ref Range: >60 mL/min >60   Glucose Latest Ref Range: 70 - 99 mg/dL 104 (H)   Calcium Latest Ref Range: 8.6 - 10.4 mg/dL 9.3   Albumin/Globulin Ratio Latest Ref Range: 1.0 - 2.5  1.6   Total Protein Latest Ref Range: 6.4 - 8.3 g/dL 6.9       Assessment:       Diagnosis Orders   1. Bilateral primary osteoarthritis of knee     2. Sprain of right ankle, unspecified ligament, subsequent encounter          Plan:      Improved R knee pain after injection. Can repeat in 3 months with lateral patellar approach to determine which is more effective. Can consider lubricating injections if no lasting relief with corticosteroid. Will continue conservative management for now. Recommended ankle sleeve for compression of ankle during the day to assist with edema and pain. Reviewed renal function and advised patient short 2-week course of NSAID is OK for her acute ankle injury. Continue icing. No orders of the defined types were placed in this encounter. No orders of the defined types were placed in this encounter. Return in about 3 months (around 10/22/2020) for repeat knee injection. Subhash Almeida is a 53 y.o. female evaluated via telephone on 7/22/2020. Consent:  She and/or health care decision maker is aware that that she may receive a bill for this telephone service, depending on her insurance coverage, and has provided verbal consent to proceed: Yes      Documentation:  I communicated with the patient and/or health care decision maker about R knee and R ankle pain. Details of this discussion including any medical advice provided: as above      I affirm this is a Patient Initiated Episode with a Patient who has not had a related appointment within my department in the past 7 days or scheduled within the next 24 hours.     Patient identification was verified at the start of the visit: Yes    Total Time: minutes: 5-10 minutes    Note: not billable if this call serves to triage the patient into an appointment for the relevant concern      AMOL HERZOG   Electronically signedby AMOL SUN MD ROSENDA on 7/22/2020 at 4:28 PM.     Please note that this chartwas generated using voice recognition Dragon dictation software. Although everyeffort was made to ensure the accuracy of this automated transcription, some errorsin transcription may have occurred.

## 2020-10-28 ENCOUNTER — OFFICE VISIT (OUTPATIENT)
Dept: FAMILY MEDICINE CLINIC | Age: 50
End: 2020-10-28
Payer: MEDICARE

## 2020-10-28 ENCOUNTER — HOSPITAL ENCOUNTER (OUTPATIENT)
Age: 50
Discharge: HOME | End: 2020-10-28
Payer: MEDICARE

## 2020-10-28 VITALS
WEIGHT: 212 LBS | OXYGEN SATURATION: 98 % | HEIGHT: 63 IN | BODY MASS INDEX: 37.56 KG/M2 | HEART RATE: 98 BPM | DIASTOLIC BLOOD PRESSURE: 94 MMHG | TEMPERATURE: 98.4 F | SYSTOLIC BLOOD PRESSURE: 138 MMHG

## 2020-10-28 LAB
ALBUMIN SERPL-MCNC: 4.5 G/DL (ref 3.5–5.2)
ALBUMIN/GLOBULIN RATIO: NORMAL (ref 1–2.5)
ALP BLD-CCNC: 93 U/L (ref 35–104)
ALT SERPL-CCNC: 27 U/L (ref 5–33)
ANION GAP SERPL CALCULATED.3IONS-SCNC: 10 MMOL/L (ref 9–17)
AST SERPL-CCNC: 23 U/L
BILIRUB SERPL-MCNC: 0.37 MG/DL (ref 0.3–1.2)
BUN BLDV-MCNC: 11 MG/DL (ref 6–20)
BUN/CREAT BLD: NORMAL (ref 9–20)
CALCIUM SERPL-MCNC: 10.1 MG/DL (ref 8.6–10.4)
CHLORIDE BLD-SCNC: 104 MMOL/L (ref 98–107)
CO2: 27 MMOL/L (ref 20–31)
CREAT SERPL-MCNC: 0.63 MG/DL (ref 0.5–0.9)
GFR AFRICAN AMERICAN: >60 ML/MIN
GFR NON-AFRICAN AMERICAN: >60 ML/MIN
GFR SERPL CREATININE-BSD FRML MDRD: NORMAL ML/MIN/{1.73_M2}
GFR SERPL CREATININE-BSD FRML MDRD: NORMAL ML/MIN/{1.73_M2}
GLUCOSE BLD-MCNC: 82 MG/DL (ref 70–99)
HCT VFR BLD CALC: 45.5 % (ref 36–46)
HEMOGLOBIN: 15.4 G/DL (ref 12–16)
MCH RBC QN AUTO: 29.4 PG (ref 26–34)
MCHC RBC AUTO-ENTMCNC: 33.8 G/DL (ref 31–37)
MCV RBC AUTO: 86.9 FL (ref 80–100)
NRBC AUTOMATED: ABNORMAL PER 100 WBC
PDW BLD-RTO: 13.7 % (ref 11.5–14.9)
PLATELET # BLD: 232 K/UL (ref 150–450)
PMV BLD AUTO: 8 FL (ref 6–12)
POTASSIUM SERPL-SCNC: 4.7 MMOL/L (ref 3.7–5.3)
RBC # BLD: 5.24 M/UL (ref 4–5.2)
SODIUM BLD-SCNC: 141 MMOL/L (ref 135–144)
TOTAL PROTEIN: 7.5 G/DL (ref 6.4–8.3)
TSH SERPL DL<=0.05 MIU/L-ACNC: 0.94 MIU/L (ref 0.3–5)
WBC # BLD: 8 K/UL (ref 3.5–11)

## 2020-10-28 PROCEDURE — 1036F TOBACCO NON-USER: CPT | Performed by: FAMILY MEDICINE

## 2020-10-28 PROCEDURE — 85027 COMPLETE CBC AUTOMATED: CPT

## 2020-10-28 PROCEDURE — 99214 OFFICE O/P EST MOD 30 MIN: CPT | Performed by: FAMILY MEDICINE

## 2020-10-28 PROCEDURE — 90686 IIV4 VACC NO PRSV 0.5 ML IM: CPT | Performed by: FAMILY MEDICINE

## 2020-10-28 PROCEDURE — G8482 FLU IMMUNIZE ORDER/ADMIN: HCPCS | Performed by: FAMILY MEDICINE

## 2020-10-28 PROCEDURE — G8427 DOCREV CUR MEDS BY ELIG CLIN: HCPCS | Performed by: FAMILY MEDICINE

## 2020-10-28 PROCEDURE — G0008 ADMIN INFLUENZA VIRUS VAC: HCPCS | Performed by: FAMILY MEDICINE

## 2020-10-28 PROCEDURE — 80053 COMPREHEN METABOLIC PANEL: CPT

## 2020-10-28 PROCEDURE — G0444 DEPRESSION SCREEN ANNUAL: HCPCS | Performed by: FAMILY MEDICINE

## 2020-10-28 PROCEDURE — 36415 COLL VENOUS BLD VENIPUNCTURE: CPT

## 2020-10-28 PROCEDURE — 84443 ASSAY THYROID STIM HORMONE: CPT

## 2020-10-28 PROCEDURE — G8417 CALC BMI ABV UP PARAM F/U: HCPCS | Performed by: FAMILY MEDICINE

## 2020-10-28 RX ORDER — AMLODIPINE BESYLATE 10 MG/1
10 TABLET ORAL EVERY EVENING
Qty: 90 TABLET | Refills: 3 | Status: SHIPPED | OUTPATIENT
Start: 2020-10-28

## 2020-10-28 RX ORDER — CARVEDILOL 6.25 MG/1
6.25 TABLET ORAL 2 TIMES DAILY WITH MEALS
Qty: 180 TABLET | Refills: 3 | Status: SHIPPED | OUTPATIENT
Start: 2020-10-28 | End: 2021-10-18

## 2020-10-28 RX ORDER — DULOXETIN HYDROCHLORIDE 60 MG/1
60 CAPSULE, DELAYED RELEASE ORAL DAILY
Qty: 90 CAPSULE | Refills: 3 | Status: SHIPPED | OUTPATIENT
Start: 2020-10-28 | End: 2021-10-18

## 2020-10-28 RX ORDER — GABAPENTIN 800 MG/1
800 TABLET ORAL 3 TIMES DAILY
Qty: 90 TABLET | Refills: 0 | Status: SHIPPED | OUTPATIENT
Start: 2020-10-28 | End: 2020-11-24

## 2020-10-28 RX ORDER — BUSPIRONE HYDROCHLORIDE 30 MG/1
30 TABLET ORAL 2 TIMES DAILY
Qty: 180 TABLET | Refills: 3 | Status: SHIPPED | OUTPATIENT
Start: 2020-10-28 | End: 2021-10-18

## 2020-10-28 RX ORDER — LISINOPRIL 10 MG/1
10 TABLET ORAL DAILY
Qty: 90 TABLET | Refills: 3 | Status: SHIPPED | OUTPATIENT
Start: 2020-10-28 | End: 2021-10-18

## 2020-10-28 RX ORDER — BACLOFEN 10 MG/1
10 TABLET ORAL 3 TIMES DAILY PRN
Qty: 90 TABLET | Refills: 0 | Status: SHIPPED | OUTPATIENT
Start: 2020-10-28

## 2020-10-28 ASSESSMENT — PATIENT HEALTH QUESTIONNAIRE - PHQ9
1. LITTLE INTEREST OR PLEASURE IN DOING THINGS: 3
4. FEELING TIRED OR HAVING LITTLE ENERGY: 3
10. IF YOU CHECKED OFF ANY PROBLEMS, HOW DIFFICULT HAVE THESE PROBLEMS MADE IT FOR YOU TO DO YOUR WORK, TAKE CARE OF THINGS AT HOME, OR GET ALONG WITH OTHER PEOPLE: 3
SUM OF ALL RESPONSES TO PHQ QUESTIONS 1-9: 18
3. TROUBLE FALLING OR STAYING ASLEEP: 3
5. POOR APPETITE OR OVEREATING: 3
SUM OF ALL RESPONSES TO PHQ QUESTIONS 1-9: 18
SUM OF ALL RESPONSES TO PHQ QUESTIONS 1-9: 18
8. MOVING OR SPEAKING SO SLOWLY THAT OTHER PEOPLE COULD HAVE NOTICED. OR THE OPPOSITE, BEING SO FIGETY OR RESTLESS THAT YOU HAVE BEEN MOVING AROUND A LOT MORE THAN USUAL: 0
SUM OF ALL RESPONSES TO PHQ9 QUESTIONS 1 & 2: 6
9. THOUGHTS THAT YOU WOULD BE BETTER OFF DEAD, OR OF HURTING YOURSELF: 0
2. FEELING DOWN, DEPRESSED OR HOPELESS: 3
7. TROUBLE CONCENTRATING ON THINGS, SUCH AS READING THE NEWSPAPER OR WATCHING TELEVISION: 3

## 2020-10-28 ASSESSMENT — ENCOUNTER SYMPTOMS
DIARRHEA: 0
NAUSEA: 0
ABDOMINAL PAIN: 0
SHORTNESS OF BREATH: 0
CONSTIPATION: 0
COUGH: 0
CHEST TIGHTNESS: 0
VOMITING: 0
WHEEZING: 0
APNEA: 1
ABDOMINAL DISTENTION: 0
BACK PAIN: 1

## 2020-10-28 NOTE — PATIENT INSTRUCTIONS
Patient Education        Preventing Depression From Coming Back: Care Instructions  Overview     Some people have depression symptoms that come back. Depression often comes and goes during a lifetime. But there are many things you can do to keep it from coming back. Follow-up care is a key part of your treatment and safety. Be sure to make and go to all appointments, and call your doctor if you are having problems. It's also a good idea to know your test results and keep a list of the medicines you take. What do you need to know? Know your risk of depression coming back  Many things can make a person more likely to have depression again. These include having depression symptoms that continue after treatment, a previous episode of depression, and a history of childhood abuse or neglect. It is important to know your risk and to recognize warning signs of depression symptoms returning. Once you know these things, you will be better able to keep it from happening to you. Know the warning signs of depression returning  The two most common signs of depression coming back are:  · Feeling sad or hopeless. · Losing interest in your daily activities. You may have other symptoms, such as:  · You eat more or less than usual.  · You sleep too much or not enough. · You feel restless and unable to sit still. · You feel unable to move. · You feel tired all the time. · You feel unworthy or guilty without an obvious reason. · You have problems concentrating, remembering, or making decisions. · You think often about death or suicide. · You feel angry or have panic attacks. How can you care for yourself at home? · Take your medicine as prescribed. Call your doctor if you have any problems with your medicine. · Continue to take your medicine after your symptoms improve. Taking your medicine for at least 6 months after you feel better can help keep you from getting depressed again.  If your depression keeps coming back, your doctor may recommend you take medicine even longer. · Continue counseling. It may help prevent depression from returning, especially if you've had multiple episodes of depression. Talk with your counselor if you are having a hard time attending your sessions or you think the sessions aren't working. Don't just stop going. · Eat healthy foods. Include fruits, vegetables, beans, and whole grains in your diet each day. · Get regular exercise. Go for a walk or jog, ride your bike, or play sports with friends. · See your doctor right away if you have new symptoms or feel that your depression is coming back. · Keep a regular sleep schedule. Try for 8 hours of sleep a night. · Avoid using illegal drugs or marijuana and drinking alcohol. · If you or someone you know talks about suicide, self-harm, or feeling hopeless, get help right away. Call the [address] [place] [address] at 8-974-284-JCLR (4-873.174.9023) or text HOME to 074430 to access the Crisis Text Line. Consider saving these numbers in your phone. When should you call for help? Call 911 anytime you think you may need emergency care. For example, call if:    · You are thinking about suicide or are threatening suicide.     · You feel you cannot stop from hurting yourself or someone else.     · You hear or see things that aren't real.     · You think or speak in a bizarre way that is not like your usual behavior. Call your doctor now or seek immediate medical care if:    · You are drinking a lot of alcohol or using illegal drugs.     · You are talking or writing about death.    Watch closely for changes in your health, and be sure to contact your doctor if:    · You find it hard or it's getting harder to deal with school, a job, family, or friends.     · You think your treatment is not helping or you are not getting better.     · Your symptoms get worse or you get new symptoms.     · You have any problems with your antidepressant medicines, such as side effects, or you are thinking about stopping your medicine.     · You are having manic behavior, such as having very high energy, needing less sleep than normal, or showing risky behavior such as spending money you don't have or abusing others verbally or physically. Where can you learn more? Go to https://chpepiceweb.[employer]. org and sign in to your [employer] account. Enter E923 in the [employer] box to learn more about \"Preventing Depression From Coming Back: Care Instructions. \"     If you do not have an account, please click on the \"Sign Up Now\" link. Current as of: January 31, 2020               Content Version: 12.6  © 1812-0274 [employer]. Care instructions adapted under license by [facility] ([facility]). If you have questions about a medical condition or this instruction, always ask your healthcare professional. [place] [address] any warranty or liability for your use of this information.

## 2020-10-28 NOTE — RESULT ENCOUNTER NOTE
ABNORMAL. Please notify patient.   Blood work within normal limits except mild increased red blood cells, could be related to sleep apnea, or not drinking enough enough water, should drink 8 x 8 ounce glasses of water every day    Please request the Medicare visit from the insurance patient told me today that she has been seen at home    Future Appointments  2/3/2021   9:15 AM    Nivia Aaron MD     fp sc          [facility]

## 2020-10-28 NOTE — PROGRESS NOTES
Visit Information    Have you changed or started any medications since your last visit including any over-the-counter medicines, vitamins, or herbal medicines? no   Are you having any side effects from any of your medications? -  no  Have you stopped taking any of your medications? Is so, why? -  no    Have you seen any other physician or provider since your last visit? Yes - Records Obtained  Have you had any other diagnostic tests since your last visit? No  Have you been seen in the emergency room and/or had an admission to a hospital since we last saw you? No  Have you had your routine dental cleaning in the past 6 months? no    Have you activated your [employer] account? If not, what are your barriers?  Yes     Patient Care Team:  Adam Lin MD as PCP - General (Family Medicine)  Adam Lin MD as PCP - [facility]  Kevin Bocanegra MD as Surgeon (Orthopedic Surgery)  Cruz Christie DO as Consulting Physician (Obstetrics & Gynecology)  Shyann Rm as Consulting Physician (Allergy)  Leisa Green MD as Consulting Physician (Pain Management)  YARI Gomez CNP as Nurse Practitioner (Neurology)  Cb Short MD as Consulting Physician (Physical Medicine and Rehab)    Medical History Review  Past Medical, Family, and Social History reviewed and does contribute to the patient presenting condition    Health Maintenance   Topic Date Due    Cervical cancer screen  06/21/2017    Annual Wellness Visit (AWV)  05/15/2020    Flu vaccine (1) 09/01/2020    Potassium monitoring  07/08/2021    Creatinine monitoring  07/08/2021    Diabetes screen  04/22/2022    Lipid screen  03/04/2025    DTaP/Tdap/Td vaccine (2 - Td) 12/05/2029    HIV screen  Completed    Hepatitis A vaccine  Aged Out    Hepatitis B vaccine  Aged Out    Hib vaccine  Aged Out    Meningococcal (ACWY) vaccine  Aged Out    Pneumococcal 0-64 years Vaccine  Aged Out

## 2020-10-28 NOTE — PROGRESS NOTES
Chief Complaint   Patient presents with    Hypertension    Snoring    Weight Loss     wants to discuss     Depression         HPI    Patient comes today due to acute illness and to follow-up on chronic illnesses    Patient reports she wants to address sleep apnea and severe snoring. She is in tears reporting me that she \"Broke up due to snoring\" with her boyfriend  Patient complains of the following symptoms progressively getting worse. Complains of daytime sleepiness      Snores loudly during sleep      Others have witnessed episodes of apnea while the patient sleeps      Tosses and Turns all night or describes their sleep as restless      Reports multiple awakenings throughout the night      Complains of morning headaches           Hypertension:    she  is not exercising and is adherent to low salt diet. Has BP machine and checking her blood pressure sometimes, which has been high lately because she stopped her blood pressure medications. Cardiac symptoms fatigue. Patient denies chest pain, chest pressure/discomfort, claudication, dyspnea, exertional chest pressure/discomfort, irregular heart beat, lower extremity edema, near-syncope, orthopnea, palpitations, paroxysmal nocturnal dyspnea, syncope and tachypnea. Cardiovascular risk factors: dyslipidemia, hypertension and obesity (BMI >= 30 kg/m2). Use of agents associated with hypertension: none. History of target organ damage: none.   Cardiac cath 12/1/2019 normal coronary arteries, distal/apical segment diffuse narrowing, EF 55% normal  Echo 2D from 12/1/2019 showed EF 58%, otherwise within normal limits    Didn't take the BP meds for 2 months, not sure why, she thinks is due to the worsening depression    blood pressure is high, not controlled    BP Readings from Last 3 Encounters:   10/28/20 (!) 138/94   07/08/20 (!) 146/86   07/06/20 108/75        Pulse is Normal.    Pulse Readings from Last 3 Encounters:   10/28/20 98   07/08/20 94   07/06/20 89 Patient has Charcot-Shanel-Tooth muscular dystrophy, she failed to follow-up with the muscular dystrophy clinic as the prior doctor retired. I advised her that I have other patients who restarted to go to the muscular dystrophy clinic, and they have a new doctor in there now. Patient verbalizes understanding and she says she will make an appointment. Patient says she has stopped gabapentin and Baclofen, the pain is worse. Interferes with activities and sleep. Intensity of pain is 8/10 today. He has pain in the knees, lower back, wearing leg braces due to foot drop bilaterally, with abnormality of the gait. Patient reports falls due to tripping, about once a month or less. Severe depression, worsening  Stopped  meds for depression for about 2 weeks or more    Denies suicidal ideation, plan or intent. Denies hallucinations  Was previously on Cymbalta, she is agreeable to restart taking it    PHQ-2 Over the past 2 weeks, how often have you been bothered by any of the following problems? Little interest or pleasure in doing things: Nearly every day  Feeling down, depressed, or hopeless: Nearly every day  PHQ-2 Score: 6  PHQ-9 Over the past 2 weeks, how often have you been bothered by any of the following problems? Trouble falling or staying asleep, or sleeping too much: Nearly every day  Feeling tired or having little energy: Nearly every day  Poor appetite or overeating: Nearly every day  Trouble concentrating on things, such as reading the newspaper or watching television: Nearly every day  Moving or speaking so slowly that other people could have noticed.  Or the opposite - being so fidgety or restless that you have been moving around a lot more than usual: Not at all  Thoughts that you would be better off dead, or of hurting yourself in some way: Not at all  If you checked off any problems, how difficult have these problems made it for you to do your work, take care of things at home, or get along with other people?: Extremely dIfficult  PHQ-9 Completed?: Complete  PHQ-9 Total Score: 18      AMB C-SSRS Suicide Screening     1) Within the past month, have you wished you were dead or wished you could go to sleep and not wake up? NO   2) Have you actually had any thoughts of killing yourself? NO   6) Have you ever done anything, started to do anything, or prepared to do anything to end your life? NO      [x]15-19 = Moderately severe depression    PHQ Scores 10/28/2020 5/15/2020 2/21/2020 1/2/2020 11/21/2019 4/22/2019 3/6/2018   PHQ2 Score 6 6 6 0 0 4 3   PHQ9 Score 18 22 21 0 0 19 11     Patient wants to lose weight. Severe obesity per BMI. There is unintentional weight gain of 10 pounds in 8 months. She is very frustrated that she has been keeping low-carb diet. She has been trying to stay active with her children. She has been unable to lose weight. She does admit to increased appetite. Wt Readings from Last 3 Encounters:   10/28/20 212 lb (96.2 kg)   07/08/20 196 lb (88.9 kg)   07/06/20 196 lb (88.9 kg)     02/21/20 202 lb (91.6 kg)     Body mass index is 37.55 kg/m². [facility] is due for Influenza vaccine. Denies side effects from prior flu shots. Denies allergy to eggs. Denies history of Guillain-Barré syndrome.            Patient reports she has stopped most of her medications    Current Outpatient Medications on File Prior to Visit   Medication Sig Dispense Refill    acetaminophen (TYLENOL) 325 MG tablet Take 1 tablet by mouth every 6 hours as needed for Pain 120 tablet 0    ibuprofen (IBU) 400 MG tablet Take 1 tablet by mouth every 6 hours as needed for Pain 120 tablet 0    fluticasone (FLONASE) 50 MCG/ACT nasal spray fluticasone propionate 50 mcg/actuation nasal spray,suspension      Loratadine (CLARITIN) 10 MG CAPS Take 10 mg by mouth daily       SUMAtriptan (IMITREX) 50 MG tablet take 1 tablet by mouth if needed AT ONSET OF HEADACHE may repeat in 30 MINUTES IF headache PERSISTS (Patient not taking: Reported on 10/28/2020) 9 tablet 3    diclofenac (SOLARAZE) 3 % gel Compound: diclofenac3%+baclofen3%+dmso5%+gabapentin6%+lidocaine2%+prilocaine2%. Apply 1-2g topically to affected area TID-QID. (Patient not taking: Reported on 10/28/2020) 120 g 2    Misc. Devices MISC 1 each by Does not apply route once as needed (self directed deep tissue massage) Please dispense one theracane device. Massage back as tolerated to relieve muscle spasms. 1 Device 0    busPIRone (BUSPAR) 30 MG tablet Take 30 mg by mouth 2 times daily (Patient not taking: Reported on 10/28/2020) 180 tablet 3    DULoxetine (CYMBALTA) 60 MG extended release capsule Take 1 capsule by mouth daily Dose increased 5/15/2020 (Patient not taking: Reported on 10/28/2020) 90 capsule 3    Multiple Vitamins-Minerals (MULTIVITAMIN WITH MINERALS) tablet Take 1 tablet by mouth daily OK to substitute (Patient not taking: Reported on 10/28/2020) 90 tablet 3    gabapentin (NEURONTIN) 800 MG tablet Take 1 tablet by mouth 3 times daily for 30 days. **changed from 600 mg to 800 mg** 90 tablet 0    lisinopril (PRINIVIL;ZESTRIL) 10 MG tablet Take 1 tablet by mouth daily (Patient not taking: Reported on 10/28/2020) 90 tablet 3    carvedilol (COREG) 6.25 MG tablet Take 1 tablet by mouth 2 times daily (with meals) (Patient not taking: Reported on 10/28/2020) 180 tablet 3    amLODIPine (NORVASC) 10 MG tablet Take 1 tablet by mouth every evening (Patient not taking: Reported on 10/28/2020) 90 tablet 3    baclofen (LIORESAL) 10 MG tablet Take 1 tablet by mouth 3 times daily as needed (MUSCLE SPASMS) Causes sedation, do not drive while taking this medication (Patient not taking: Reported on 10/28/2020) 90 tablet 0    ammonium lactate (AMLACTIN) 12 % cream ammonium lactate 12 % topical cream      Blood Pressure KIT Dx: HTN. Needs automatic blood pressure machine to monitor her blood pressure.  (Patient not taking: Reported on 10/28/2020) 1 kit 0 No current facility-administered medications on file prior to visit. Social History     Tobacco Use    Smoking status: Never Smoker    Smokeless tobacco: Never Used   Substance Use Topics    Alcohol use: Yes     Comment: social    Drug use: No     Comment: + THC states she had one marijuna cookie       Counseling given: Yes                    The patient's past medical, surgical, social, and family history as well as her current medications and allergies were reviewed as documented in today's encounter. Rest of complaints with corresponding details per ROS. Review of Systems   Constitutional: Positive for fatigue and unexpected weight change. Negative for activity change, appetite change, chills, diaphoresis and fever. Respiratory: Positive for apnea. Negative for cough, chest tightness, shortness of breath and wheezing. Severe snoring   Cardiovascular: Negative for chest pain, palpitations and leg swelling. Gastrointestinal: Negative for abdominal distention, abdominal pain, constipation, diarrhea, nausea and vomiting. Endocrine: Positive for polyphagia. Negative for cold intolerance, heat intolerance, polydipsia and polyuria. Musculoskeletal: Positive for arthralgias (knees), back pain, gait problem and myalgias. Missed appointment with [facility] clinic, I advised her to reschedule   Neurological: Positive for weakness (legs , foot drop b/l). Hematological: Does not bruise/bleed easily. Psychiatric/Behavioral: Positive for decreased concentration, dysphoric mood and sleep disturbance. Negative for hallucinations, self-injury and suicidal ideas.  The patient is nervous/anxious.          -vital signs stable and within normal limits except severe obesity per BMI, blood pressure is high    BP (!) 138/94   Pulse 98   Temp 98.4 °F (36.9 °C) (Temporal)   Ht 5' 3\" (1.6 m)   Wt 212 lb (96.2 kg)   SpO2 98%   BMI 37.55 kg/m²        Physical Exam  Vitals signs and nursing note reviewed. Constitutional:       General: She is not in acute distress. Appearance: Normal appearance. She is well-developed. She is obese. She is not diaphoretic. HENT:      Head: Normocephalic and atraumatic. Right Ear: External ear normal.      Left Ear: External ear normal.      Nose: Nose normal. No mucosal edema. Mouth/Throat:      Comments: I did not examine the mouth due to coronavirus pandemic and wearing masks    Eyes:      General: Lids are normal. No scleral icterus. Right eye: No discharge. Left eye: No discharge. Conjunctiva/sclera: Conjunctivae normal.   Neck:      Musculoskeletal: Normal range of motion and neck supple. Thyroid: No thyromegaly. Comments: Thick neck  Cardiovascular:      Rate and Rhythm: Normal rate and regular rhythm. Heart sounds: Normal heart sounds. No murmur. Pulmonary:      Effort: Pulmonary effort is normal. No respiratory distress. Breath sounds: Normal breath sounds. No wheezing or rales. Chest:      Chest wall: No tenderness. Abdominal:      General: Bowel sounds are normal. There is no distension. Palpations: Abdomen is soft. There is no hepatomegaly or splenomegaly. Tenderness: There is no abdominal tenderness. Comments: Obese abdomen   Musculoskeletal:         General: Tenderness present. No swelling. Right knee: She exhibits decreased range of motion. Tenderness found. Patellar tendon tenderness noted. Left knee: She exhibits decreased range of motion. Tenderness found. Patellar tendon tenderness noted. Lumbar back: She exhibits decreased range of motion, tenderness, bony tenderness (lumbar and SIJ b/l), pain and spasm. Right lower leg: No edema. Left lower leg: No edema. Comments: Wearing leg braces. Hesitation when first starting to walk.  Doesn't have the cane with her  Very coarse crepitus bilateral knees, feels like popping, or subluxating patella   Skin: 12/16/2016       Lab Results   Component Value Date    CHOLHDLRATIO 3.2 03/04/2020    CHOLHDLRATIO 3.5 01/12/2018    CHOLHDLRATIO 3.4 12/16/2016       Lab Results   Component Value Date    LABA1C 4.7 04/22/2019         ASSESSMENT AND PLAN      1. Sleep apnea, unspecified type  Worsening  - Sleep Study with PAP Titration; Future  - Baseline Diagnostic Sleep Study; Future    2. Essential hypertension  Inadequately controlled  Restart blood pressure medications  Discussed low salt diet and BP and pulse monitoring daily  Update labs  - lisinopril (PRINIVIL;ZESTRIL) 10 MG tablet; Take 1 tablet by mouth daily  Dispense: 90 tablet; Refill: 3  - carvedilol (COREG) 6.25 MG tablet; Take 1 tablet by mouth 2 times daily (with meals)  Dispense: 180 tablet; Refill: 3  - amLODIPine (NORVASC) 10 MG tablet; Take 1 tablet by mouth every evening  Dispense: 90 tablet; Refill: 3  - CBC; Future  - Comprehensive Metabolic Panel; Future  - TSH without Reflex; Future    3. Charcot Shanel Tooth muscular atrophy  Worsening  Patient was advised to follow-up with muscular dystrophy clinic to reschedule her appointment  Restart baclofen and gabapentin, continue leg braces, advised to do her muscular dystrophy exercises every day  - baclofen (LIORESAL) 10 MG tablet; Take 1 tablet by mouth 3 times daily as needed (MUSCLE SPASMS) Causes sedation, do not drive while taking this medication  Dispense: 90 tablet; Refill: 0  - gabapentin (NEURONTIN) 800 MG tablet; Take 1 tablet by mouth 3 times daily for 30 days. **changed from 600 mg to 800 mg**  Dispense: 90 tablet; Refill: 0    4. Moderate episode of recurrent major depressive disorder (HCC)  Worsening  Restart medications and refer to behavioral therapy  - DULoxetine (CYMBALTA) 60 MG extended release capsule; Take 1 capsule by mouth daily Dose increased 5/15/2020  Dispense: 90 capsule; Refill: 3  - busPIRone (BUSPAR) 30 MG tablet; Take 30 mg by mouth 2 times daily  Dispense: 180 tablet;  Refill: 3  - [employer]    5. Severe obesity (BMI 35.0-39. 9) with comorbidity ([address]  Worsening  Likely related to depression and possibly sleep apnea  Patient was asked about her current diet and exercise habits, and personalized advice was provided regarding recommended lifestyle changes. Patient's comorbid health conditions associated with elevated BMI were discussed, including degenerative joint disease, hyperglycemia, hypertension, mood disorder and obstructive sleep apnea, as well as the likely benefits of weight loss. Based upon patient's motivation to change her behavior, the following plan was agreed upon to work toward a weight loss goal of 1-2 pounds per week: low carbohydrate diet, wear a pedometer and get at least 10,000 steps per day and Restart the anti-depression medication. Educational materials for  weight loss were provided. Patient will follow-up in 6 weeks with PCP. Provider spent 10 minutes counseling patient. 6. Snoring  Worsening  - Sleep Study with PAP Titration; Future  - Baseline Diagnostic Sleep Study; Future    7. Encounter for immunization    - INFLUENZA, QUADV, 3 YRS AND OLDER, IM PF, PREFILL SYR OR SDV, 0.5ML (AFLURIA QUADV, PF)    Controlled Substance Monitoring:    Acute and Chronic Pain Monitoring:   RX Monitoring 10/28/2020   Attestation -   Periodic Controlled Substance Monitoring Possible medication side effects, risk of tolerance/dependence & alternative treatments discussed. ;No signs of potential drug abuse or diversion identified. ;Assessed functional status.    Chronic Pain > 80 MEDD -           [address] obtain medicare report from insurance, had it done this year    Orders Placed This Encounter   Procedures    INFLUENZA, QUADV, 3 YRS AND OLDER, IM PF, PREFILL SYR OR SDV, 0.5ML (AFLURIA QUADV, PF)    CBC     Standing Status:   Future     Number of Occurrences:   1     Standing Expiration Date:   10/28/2020    Comprehensive Metabolic Panel     Standing Status:   Future Number of Occurrences:   1     Standing Expiration Date:   10/28/2020    TSH without Reflex     Standing Status:   Future     Number of Occurrences:   1     Standing Expiration Date:   10/28/2020   [address]     Referral Priority:   Routine     Referral Type:   Eval and Treat     Referral Reason:   Specialty Services Required     Requested Specialty:   Psychology     Number of Visits Requested:   1    Sleep Study with PAP Titration     Standing Status:   Future     Standing Expiration Date:   12/28/2020     Order Specific Question:   Sleep Study Titration Type     Answer:   CPAP     Order Specific Question:   Location For Sleep Study     Answer: [facility] Specific Question:   Select Sleep Lab Location     Answer:   [address]     Order Specific Question:   Pre-Study Patient Questions: Answer:   Complains of daytime sleepiness     Order Specific Question:   Pre-Study Patient Questions: Answer:   Snores loudly during sleep     Order Specific Question:   Pre-Study Patient Questions: Answer: Others have witnessed episodes of apnea while the patient sleeps     Order Specific Question:   Pre-Study Patient Questions: Answer: Tosses and Turns all night or describes their sleep as restless     Order Specific Question:   Pre-Study Patient Questions: Answer:   Complains of morning headaches     Order Specific Question:   Pre-Study Patient Questions: Answer:   Impaired attention, concentration, or memory due to poor sleep    Baseline Diagnostic Sleep Study     Standing Status:   Future     Standing Expiration Date:   11/28/2020     Order Specific Question:   Adult or Pediatric     Answer:   Adult Study (>7 Years)     Order Specific Question:   Location For Sleep Study     Answer: [facility] Specific Question:   Select Sleep Lab Location     Answer:   [address]     Order Specific Question:   Pre-Study Patient Questions:      Answer: Complains of daytime sleepiness     Order Specific Question:   Pre-Study Patient Questions: Answer:   Snores loudly during sleep     Order Specific Question:   Pre-Study Patient Questions: Answer: Others have witnessed episodes of apnea while the patient sleeps     Order Specific Question:   Pre-Study Patient Questions: Answer: Tosses and Turns all night or describes their sleep as restless     Order Specific Question:   Pre-Study Patient Questions: Answer:   Reports multiple awakenings throughout the night     Order Specific Question:   Pre-Study Patient Questions: Answer:   Complains of morning headaches       Orders Placed This Encounter   Medications    lisinopril (PRINIVIL;ZESTRIL) 10 MG tablet     Sig: Take 1 tablet by mouth daily     Dispense:  90 tablet     Refill:  3    carvedilol (COREG) 6.25 MG tablet     Sig: Take 1 tablet by mouth 2 times daily (with meals)     Dispense:  180 tablet     Refill:  3    amLODIPine (NORVASC) 10 MG tablet     Sig: Take 1 tablet by mouth every evening     Dispense:  90 tablet     Refill:  3    baclofen (LIORESAL) 10 MG tablet     Sig: Take 1 tablet by mouth 3 times daily as needed (MUSCLE SPASMS) Causes sedation, do not drive while taking this medication     Dispense:  90 tablet     Refill:  0    DULoxetine (CYMBALTA) 60 MG extended release capsule     Sig: Take 1 capsule by mouth daily Dose increased 5/15/2020     Dispense:  90 capsule     Refill:  3    busPIRone (BUSPAR) 30 MG tablet     Sig: Take 30 mg by mouth 2 times daily     Dispense:  180 tablet     Refill:  3    gabapentin (NEURONTIN) 800 MG tablet     Sig: Take 1 tablet by mouth 3 times daily for 30 days.  **changed from 600 mg to 800 mg**     Dispense:  90 tablet     Refill:  0       Medications Discontinued During This Encounter   Medication Reason    lisinopril (PRINIVIL;ZESTRIL) 10 MG tablet REORDER    carvedilol (COREG) 6.25 MG tablet REORDER    amLODIPine (NORVASC) 10 MG tablet REORDER    baclofen (LIORESAL) 10 MG tablet REORDER    DULoxetine (CYMBALTA) 60 MG extended release capsule REORDER    busPIRone (BUSPAR) 30 MG tablet REORDER    gabapentin (NEURONTIN) 800 MG tablet REORDER       Sushma received counseling on the following healthy behaviors: nutrition, exercise, medication adherence and weight loss. Reviewed prior labs and health maintenance. Continue current medications, diet and exercise. Discussed use, benefit, and side effects of prescribed medications. Barriers to medication compliance addressed. Patient given educational materials - see patient instructions. All patient questions answered. Patient voiced understanding. Thepatient's past medical, surgical, social, and family history as well as her   current medications and allergies were reviewed as documented in today's encounter. Medications, labs, diagnostic studies,consultations and follow-up as documented in this encounter. Return in about 6 weeks (around 12/9/2020) for PHQ-9 in EPIC, anxiety-DO JOANN 7 in EPIC, LABS F/U, HTN. Patient was seen with total face to face time of  25 minutes. More than 50% of this visit was counseling and education. Future Appointments   Date Time Provider Dom Trejo   2/3/2021  9:15 AM [facility]     This note was completed by using the assistance of a speech-recognition program. However, inadvertent computerized transcription errors may be present. Although every effort was made to ensure accuracy, no guarantees can be provided that every mistake has been identified and corrected by editing.     Electronically signed by Marie Vigil MD on 11/2/2020 at 8:22 AM

## 2020-11-02 PROBLEM — G47.30 SLEEP APNEA: Status: ACTIVE | Noted: 2020-11-02

## 2020-11-24 RX ORDER — GABAPENTIN 800 MG/1
800 TABLET ORAL 3 TIMES DAILY
Qty: 90 TABLET | Refills: 0 | Status: SHIPPED | OUTPATIENT
Start: 2020-11-24 | End: 2020-12-22 | Stop reason: SDUPTHER

## 2020-11-24 NOTE — TELEPHONE ENCOUNTER
Please Approve or Refuse.   Send to Pharmacy per Pt's Request:      Next Visit Date:  2/3/2021   Last Visit Date: 10/28/2020    Hemoglobin A1C (%)   Date Value   04/22/2019 4.7   03/20/2018 5.1   07/08/2016 4.6             ( goal A1C is < 7)   BP Readings from Last 3 Encounters:   10/28/20 (!) 138/94   07/08/20 (!) 146/86   07/06/20 108/75          (goal 120/80)  BUN   Date Value Ref Range Status   10/28/2020 11 6 - 20 mg/dL Final     CREATININE   Date Value Ref Range Status   10/28/2020 0.63 0.50 - 0.90 mg/dL Final     Potassium   Date Value Ref Range Status   10/28/2020 4.7 3.7 - 5.3 mmol/L Final

## 2020-12-22 RX ORDER — GABAPENTIN 800 MG/1
800 TABLET ORAL 3 TIMES DAILY
Qty: 90 TABLET | Refills: 0 | Status: SHIPPED | OUTPATIENT
Start: 2020-12-22 | End: 2021-01-20

## 2021-01-20 DIAGNOSIS — G60.0 CHARCOT MARIE TOOTH MUSCULAR ATROPHY: Chronic | ICD-10-CM

## 2021-01-20 RX ORDER — GABAPENTIN 800 MG/1
800 TABLET ORAL 3 TIMES DAILY
Qty: 90 TABLET | Refills: 0 | Status: SHIPPED | OUTPATIENT
Start: 2021-01-20 | End: 2021-02-17

## 2021-02-05 PROBLEM — F33.1 MODERATE EPISODE OF RECURRENT MAJOR DEPRESSIVE DISORDER (HCC): Status: ACTIVE | Noted: 2021-02-05

## 2021-02-05 NOTE — PROGRESS NOTES
[facility]  [facility]  [address]  [address] [address]  Dept: [phone]     2021   Chief Complaint   Patient presents with    Hypertension    Depression    Anxiety    Discuss Labs    Weight Management     HPI  Micki Pimentel (:  1970) is a 53 y.o. female is an established patient of Dr. Rachel Ardon. Patient has a history of hypertension, depression, lumbar radiculopathy, muscular dystrophy, osteoarthritis of multiple joints, chronic rhinitis, insomnia, and obesity. Very active    HYPERTENSION  Sushma Davis has a well controlled hypertension. she is currently on amlodipine, lisinopril, coreg. Patient's most recent BP in the office was stable. she reports stable BP readings at home. Patient denies any adverse reaction to this therapy. she denies any CP, SOB, HA, or palpitations. Patient admits to exercising and adheres to low salt diet. No history of organ damage due to condition noted. Lab Results   Component Value Date/Time    CREATININE 0.63 10/28/2020 10:09 AM     MUSCULAR DYSTROPHY/ LUMbAR RADICULOPATHY/ARTHRITIS  Denies history of muscular dystrophy. She is also being treated for lumbar radiculopathy and bilateral knee arthritis. Patient currently has 2 ankle braces. She does well. She can also do some mild walking but is unable to do most of the exercise equipments. Patient wanted to talk about weight control or weight management today but will not have the time to discuss everything. Therefore, we are going to continue to discuss this on another visit. Patient is aware and agreeable. Patient is currently on gabapentin, and ibuprofen. Patient reports fair success. DEPRESSION AND ANXIETY  Patient was  Micki Pimentel reported some ongoing issues with depression and anxiety. Symptoms includes insomnia, irritable, psychomotor agitation, racing thoughts and anhedonia and see below.   Current therapy includes cymbalta, which is working well for her. she denies adverse reaction to current therapy. she also denies suicidal/homicidal ideation, plan or intent. Patient being seen by no psychiatrist.  PHQ-2 Over the past 2 weeks, how often have you been bothered by any of the following problems? Little interest or pleasure in doing things: Nearly every day  Feeling down, depressed, or hopeless: Several days  PHQ-2 Score: 4  PHQ-9 Over the past 2 weeks, how often have you been bothered by any of the following problems? Trouble falling or staying asleep, or sleeping too much: Nearly every day  Feeling tired or having little energy: Nearly every day  Poor appetite or overeating: Not at all  Feeling bad about yourself - or that you are a failure or have let yourself or your family down: Nearly every day  Trouble concentrating on things, such as reading the newspaper or watching television: Nearly every day  Moving or speaking so slowly that other people could have noticed.  Or the opposite - being so fidgety or restless that you have been moving around a lot more than usual: Not at all  Thoughts that you would be better off dead, or of hurting yourself in some way: Not at all  If you checked off any problems, how difficult have these problems made it for you to do your work, take care of things at home, or get along with other people?: Very difficult  PHQ-9 Total Score: 16   [facility] JOANN-7 2/8/2021 3/6/2018   Feeling nervous, anxious, or on edge 3-Nearly every day 3-Nearly every day   Not able to stop or control worrying 3-Nearly every day 3-Nearly every day   Worrying too much about different things 3-Nearly every day 3-Nearly every day   Trouble relaxing 3-Nearly every day 2-Over half the days   Being so restless that it's hard to sit still 3-Nearly every day 2-Over half the days   Becoming easily annoyed or irritable 3-Nearly every day 3-Nearly every day   Feeling afraid as if something awful might happen 0-Not at all 0-Not at all sure   JOANN-7 stenosis, thoracic    Numbness and tingling things b/l L/E    Essential hypertension    Intramural leiomyoma of uterus    Radiculopathy, lumbar region    Osteoarthritis of knee    Tension headache    Snoring    Enlarged tonsils    Bilateral primary osteoarthritis of knee    Severe episode of recurrent major depressive disorder, without psychotic features ([facility] [address]    Sleep apnea    Moderate episode of recurrent major depressive disorder ([facility] [address]      Past Medical History:   Diagnosis Date    [facility] at rest [facility]) 2019    Arthritis     Charcot-Shanel-Tooth disease     Depression     History of      one    History of miscarriage     x2    History of tubal ligation 1994    Hypertension     Kidney anomaly, 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, 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    Kidney stones     Muscular dystrophy ([facility] [address]     Renal disease 2016    Seasonal allergies     Vertigo 2019      Past Surgical History:   Procedure Laterality Date    BREAST BIOPSY Bilateral     CHOLECYSTECTOMY, LAPAROSCOPIC  16    Dr Debra Avelar ENDOMETRIAL ABLATION  10/19/2016    hysteroscopy;  [facility]    INGUINAL HERNIA REPAIR      right    KIDNEY REMOVAL      1 kidneys removed    KNEE ARTHROSCOPY Left     KNEE ARTHROSCOPY Right 2016    TUBAL LIGATION  1994     Family History   Problem Relation Age of Onset    Other Mother         muscular dystrophy    High Blood Pressure Mother     Cirrhosis Father     Alcohol Abuse Father     COPD Father     Hypertension Father     Seizures Brother     Diabetes Brother     Cirrhosis Maternal Grandfather     Alcohol Abuse Maternal Grandfather     Alzheimer's Disease Paternal Grandmother     Other Paternal Grandfather         suicide     Current Outpatient Medications   Medication Sig Dispense Refill    loratadine (CLARITIN) 10 MG capsule Take 1 capsule by mouth daily 90 capsule 1    fluticasone (FLONASE) 50 MCG/ACT nasal spray fluticasone propionate 50 mcg/actuation nasal spray,suspension 1 Bottle 2    mirtazapine (REMERON) 15 MG tablet Take 1 tablet by mouth nightly 30 tablet 3    gabapentin (NEURONTIN) 800 MG tablet Take 1 tablet by mouth 3 times daily for 30 days. 90 tablet 0    lisinopril (PRINIVIL;ZESTRIL) 10 MG tablet Take 1 tablet by mouth daily 90 tablet 3    carvedilol (COREG) 6.25 MG tablet Take 1 tablet by mouth 2 times daily (with meals) 180 tablet 3    amLODIPine (NORVASC) 10 MG tablet Take 1 tablet by mouth every evening 90 tablet 3    baclofen (LIORESAL) 10 MG tablet Take 1 tablet by mouth 3 times daily as needed (MUSCLE SPASMS) Causes sedation, do not drive while taking this medication 90 tablet 0    DULoxetine (CYMBALTA) 60 MG extended release capsule Take 1 capsule by mouth daily Dose increased 5/15/2020 90 capsule 3    busPIRone (BUSPAR) 30 MG tablet Take 30 mg by mouth 2 times daily 180 tablet 3    acetaminophen (TYLENOL) 325 MG tablet Take 1 tablet by mouth every 6 hours as needed for Pain 120 tablet 0    ibuprofen (IBU) 400 MG tablet Take 1 tablet by mouth every 6 hours as needed for Pain 120 tablet 0    Blood Pressure KIT Dx: HTN. Needs automatic blood pressure machine to monitor her blood pressure. 1 kit 0     No current facility-administered medications for this visit. Review of Systems   Constitutional: Positive for unexpected weight change. Negative for activity change, appetite change, chills, diaphoresis, fatigue and fever. Respiratory: Negative for apnea, cough, chest tightness, shortness of breath and wheezing. Cardiovascular: Negative for chest pain, palpitations and leg swelling. Gastrointestinal: Negative for abdominal distention, abdominal pain, constipation, diarrhea, nausea and vomiting. Endocrine: Negative for cold intolerance, heat intolerance, polydipsia, polyphagia and polyuria.    Musculoskeletal: Positive for arthralgias (knees), back pain, gait problem and myalgias. Missed appointment with MD clinic, I advised her to reschedule   Neurological: Positive for weakness (legs , foot drop b/l). Negative for numbness. Hematological: Does not bruise/bleed easily. Psychiatric/Behavioral: Positive for decreased concentration and sleep disturbance. Negative for suicidal ideas. The patient is nervous/anxious. Prior to Visit Medications    Medication Sig Taking? Authorizing Provider   loratadine (CLARITIN) 10 MG capsule Take 1 capsule by mouth daily Yes YARI Coronado CNP   fluticasone (FLONASE) 50 MCG/ACT nasal spray fluticasone propionate 50 mcg/actuation nasal spray,suspension Yes YARI Coronado CNP   mirtazapine (REMERON) 15 MG tablet Take 1 tablet by mouth nightly Yes YARI Coronado CNP   gabapentin (NEURONTIN) 800 MG tablet Take 1 tablet by mouth 3 times daily for 30 days. Yes Honey Michael MD   lisinopril (PRINIVIL;ZESTRIL) 10 MG tablet Take 1 tablet by mouth daily Yes Honey Michael MD   carvedilol (COREG) 6.25 MG tablet Take 1 tablet by mouth 2 times daily (with meals) Yes Honey Michael MD   amLODIPine (NORVASC) 10 MG tablet Take 1 tablet by mouth every evening Yes Honey Michael MD   baclofen (LIORESAL) 10 MG tablet Take 1 tablet by mouth 3 times daily as needed (MUSCLE SPASMS) Causes sedation, do not drive while taking this medication Yes Honey Michael MD   DULoxetine (CYMBALTA) 60 MG extended release capsule Take 1 capsule by mouth daily Dose increased 5/15/2020 Yes Honey Michael MD   busPIRone (BUSPAR) 30 MG tablet Take 30 mg by mouth 2 times daily Yes Honey Michael MD   acetaminophen (TYLENOL) 325 MG tablet Take 1 tablet by mouth every 6 hours as needed for Pain Yes Danielle Serna MD   ibuprofen (IBU) 400 MG tablet Take 1 tablet by mouth every 6 hours as needed for Pain Yes Danielle Serna MD   Blood Pressure KIT Dx: HTN.  Needs automatic blood pressure machine to monitor her blood pressure. Yes Maria Isabel Li, APRN - CNP      Social History     Tobacco Use    Smoking status: Never Smoker    Smokeless tobacco: Never Used   Substance Use Topics    Alcohol use: Yes     Comment: social      Vitals:    02/08/21 0756   BP: 130/86   Pulse: 92   Temp: 97.2 °F (36.2 °C)   TempSrc: Temporal   SpO2: 98%   Weight: 210 lb (95.3 kg)   Height: 5' 3\" (1.6 m)     Estimated body mass index is 37.2 kg/m² as calculated from the following:    Height as of this encounter: 5' 3\" (1.6 m). Weight as of this encounter: 210 lb (95.3 kg). Physical Exam  Vitals signs and nursing note reviewed. Constitutional:       General: She is not in acute distress. Appearance: Normal appearance. She is well-developed. She is obese. She is not diaphoretic. HENT:      Head: Normocephalic and atraumatic. Right Ear: External ear normal.      Left Ear: External ear normal.      Nose: Nose normal. No mucosal edema. Mouth/Throat:      Comments: I did not examine the mouth due to coronavirus pandemic and wearing masks    Eyes:      General: Lids are normal. No scleral icterus. Right eye: No discharge. Left eye: No discharge. Conjunctiva/sclera: Conjunctivae normal.   Neck:      Musculoskeletal: Normal range of motion and neck supple. Thyroid: No thyromegaly. Comments: Thick neck  Cardiovascular:      Rate and Rhythm: Normal rate and regular rhythm. Heart sounds: Normal heart sounds. No murmur. Pulmonary:      Effort: Pulmonary effort is normal. No respiratory distress. Breath sounds: Normal breath sounds. No wheezing or rales. Chest:      Chest wall: No tenderness. Abdominal:      General: Bowel sounds are normal. There is no distension. Palpations: Abdomen is soft. There is no hepatomegaly or splenomegaly. Tenderness: There is no abdominal tenderness.       Comments: Obese abdomen   Musculoskeletal:         General: No swelling or tenderness. Right knee: She exhibits decreased range of motion. She exhibits no deformity. Left knee: She exhibits decreased range of motion. She exhibits no deformity. Lumbar back: She exhibits decreased range of motion and pain. She exhibits no bony tenderness and no spasm. Right lower leg: She exhibits deformity. Left lower leg: She exhibits deformity. Comments: Chronic deformity due to MD   Skin:     General: Skin is warm and dry. Capillary Refill: Capillary refill takes less than 2 seconds. Findings: No rash. Neurological:      Mental Status: She is alert and oriented to person, place, and time. Cranial Nerves: No cranial nerve deficit. Motor: Abnormal muscle tone present. Gait: Gait abnormal.      Deep Tendon Reflexes: Reflexes abnormal.      Comments:  wearing leg braces, gait normal, normal shoes,no podiatry   Psychiatric:         Mood and Affect: Mood is anxious. Affect is labile. Speech: Speech is delayed. Behavior: Behavior normal.         Thought Content: Thought content normal.         Judgment: Judgment normal.       Most recent labs reviewed with patient, and all questions answered.   Lab Results   Component Value Date    WBC 8.0 10/28/2020    HGB 15.4 10/28/2020    HCT 45.5 10/28/2020    MCV 86.9 10/28/2020     10/28/2020     Lab Results   Component Value Date     10/28/2020    K 4.7 10/28/2020     10/28/2020    CO2 27 10/28/2020    BUN 11 10/28/2020    CREATININE 0.63 10/28/2020    GLUCOSE 82 10/28/2020    CALCIUM 10.1 10/28/2020      Lab Results   Component Value Date    ALT 27 10/28/2020    AST 23 10/28/2020    ALKPHOS 93 10/28/2020    BILITOT 0.37 10/28/2020     Lab Results   Component Value Date    TSH 0.94 10/28/2020     Lab Results   Component Value Date    CHOL 170 03/04/2020    CHOL 182 01/12/2018    CHOL 202 (H) 12/16/2016     Lab Results   Component Value Date    TRIG 194 (H) 03/04/2020    TRIG 138 01/12/2018    TRIG 162 (H) 12/16/2016     Lab Results   Component Value Date    HDL 53 03/04/2020    HDL 52 01/12/2018    HDL 60 12/16/2016     Lab Results   Component Value Date    LDLCHOLESTEROL 78 03/04/2020    LDLCHOLESTEROL 102 01/12/2018    LDLCHOLESTEROL 110 12/16/2016     Lab Results   Component Value Date    CHOLHDLRATIO 3.2 03/04/2020    CHOLHDLRATIO 3.5 01/12/2018    CHOLHDLRATIO 3.4 12/16/2016     Lab Results   Component Value Date    LABA1C 4.7 04/22/2019      Lab Results   Component Value Date    JPCOOUUJ22 554 03/20/2018     Lab Results   Component Value Date    FOLATE >20.0 03/20/2018     Lab Results   Component Value Date    VITD25 55.5 03/20/2018     ASSESSMENT/PLAN:  1. Essential hypertension  Stable  Continue current therapy. DISCUSSED AND ADVISED TO:  Cut down on your salt intake. Cut down on caffeinated drinks, sports drinks. Instructed to check BP at home regularly. Report for any chest pains, shortness of breath, headaches, and lightheadedness. Call the office if your blood pressure continue to be higher than 140/90 or 90/50. 2. Moderate episode of recurrent major depressive disorder (HCC)  Failure to Improve  Will add remeron for insomnia  Continue current therapy. DISCUSSED and ADVISED TO:  Not stopping medication suddenly. See the specialist as discussed. Report for feelings of SI, HI, and hallucinations. Go to the ER for increasing urge to hurt yourself. - Urine Drug Screen; Future  - mirtazapine (REMERON) 15 MG tablet; Take 1 tablet by mouth nightly  Dispense: 30 tablet; Refill: 3    3. Radiculopathy, lumbar region  Stable  Continue current therapy. DISCUSSED AND ADVISED TO:  Use heat packs 15 to 20 mins every 2-3 hours. Do some back stretches as tolerated. Refer to hand out for instructions. Call for worsening, numbness, weakness. - Urine Drug Screen; Future    4.  Charcot Shanel Tooth muscular atrophy  Stable  Continue therapy  monitor  - Urine Drug Screen; Future    5. Bilateral primary osteoarthritis of knee  Stable  Continue current therapy. DISCUSSED and ADVISED TO:  Stay at a healthy weight. Continue exercises/PT  Stretch to help prevent stiffness and to prevent injury before exercise. Gentle forms of yoga help keep joints and muscles flexible. Walk instead of jog, ride a bike, swim, and water exercise. Lift weights as tolerated. strong muscles help reduce stress on joints. Take pain medicines exactly as directed and only as needed. 6. Chronic rhinitis  Stable  Continue with the same therapy   ADVISED TO:  Avoid known allergens/irritants. Stop smoking or avoid second hand smoke. Stay hydrated. Report for worsening symptoms    - loratadine (CLARITIN) 10 MG capsule; Take 1 capsule by mouth daily  Dispense: 90 capsule; Refill: 1  - fluticasone (FLONASE) 50 MCG/ACT nasal spray; fluticasone propionate 50 mcg/actuation nasal spray,suspension  Dispense: 1 Bottle; Refill: 2    7. Positive depression screening  Failure to Improve  Continue current therapy. DISCUSSED and ADVISED TO:  Not stopping medication suddenly. See the specialist as discussed. Report for feelings of SI, HI, and hallucinations. Go to the ER for increasing urge to hurt yourself. - Positive Screen for Clinical Depression with a Documented Follow-up Plan   - mirtazapine (REMERON) 15 MG tablet; Take 1 tablet by mouth nightly  Dispense: 30 tablet; Refill: 3    8. Psychophysiological insomnia  Worsening  Continue current routine or therapy. DISCUSSED AND ADVISED TO:  Cut down intake of drinks with caffeine, such as coffee or black tea, for 8 hours before bed. Cut down on smoking or use other types of tobacco near bedtime. Cut down on Nicotine and alcohol   Stop eating a big meal close to bedtime. Stop drinking a lot of  fluids close to bedtime.         9. Class 2 severe obesity with serious comorbidity and body mass index (BMI) of 37.0 to 37.9 in adult, unspecified obesity type ([address]  BMI increasing  DISCUSSED AND ADVISED TO:  Eat a low-fat and low carbohydrates diet. Avoid fried foods especially fast food. Choose healthier options for snacks. Have 5-6 servings of fruits and vegetables per day. Cut down on eating processed food. Add 30 minutes to 1 hour aerobic exercise for 3-4 days a week. 10. Screening for viral disease  recommended    - Hepatitis C Antibody; Future     Controlled Substance Monitoring:  Acute and Chronic Pain Monitoring:   RX Monitoring 2/7/2021   Attestation -   Periodic Controlled Substance Monitoring No signs of potential drug abuse or diversion identified. Chronic Pain > 80 MEDD -     Orders Placed This Encounter   Procedures    Urine Drug Screen     Standing Status:   Future     Standing Expiration Date:   2/8/2022    Hepatitis C Antibody     Standing Status:   Future     Standing Expiration Date:   2/8/2022    Positive Screen for Clinical Depression with a Documented Follow-up Plan      Orders Placed This Encounter   Medications    loratadine (CLARITIN) 10 MG capsule     Sig: Take 1 capsule by mouth daily     Dispense:  90 capsule     Refill:  1    fluticasone (FLONASE) 50 MCG/ACT nasal spray     Sig: fluticasone propionate 50 mcg/actuation nasal spray,suspension     Dispense:  1 Bottle     Refill:  2    mirtazapine (REMERON) 15 MG tablet     Sig: Take 1 tablet by mouth nightly     Dispense:  30 tablet     Refill:  3      Medications Discontinued During This Encounter   Medication Reason    SUMAtriptan (IMITREX) 50 MG tablet     Multiple Vitamins-Minerals (MULTIVITAMIN WITH MINERALS) tablet Therapy completed    diclofenac (SOLARAZE) 3 % gel LIST CLEANUP    ammonium lactate (AMLACTIN) 12 % cream     Misc.  Devices MISC Therapy completed    Loratadine (CLARITIN) 10 MG CAPS REORDER    fluticasone (FLONASE) 50 MCG/ACT nasal spray REORDER      Health Maintenance Due   Topic Date Due    Hepatitis C screen  1970    Cervical cancer screen  06/21/2017    Annual Wellness Visit (AWV)  05/15/2020      Return in about 4 weeks (around 3/8/2021) for Pls do PHQ9, Pls do GAD7. Sushma received counseling on the following healthy behaviors: nutrition, exercise and medication adherence  Reviewed prior labs and health maintenance  Continue current medications, diet and exercise. Discussed use, benefit, and side effects of prescribed medications. Barriers to medication compliance addressed. Patient given educational materials - see patient instructions  Was a self-tracking handout given in paper form or via [email or web address]? Yes    Requested Prescriptions     Signed Prescriptions Disp Refills    loratadine (CLARITIN) 10 MG capsule 90 capsule 1     Sig: Take 1 capsule by mouth daily    fluticasone (FLONASE) 50 MCG/ACT nasal spray 1 Bottle 2     Sig: fluticasone propionate 50 mcg/actuation nasal spray,suspension    mirtazapine (REMERON) 15 MG tablet 30 tablet 3     Sig: Take 1 tablet by mouth nightly       All patient questions answered. Patient voiced understanding. Quality Measures    Body mass index is 37.2 kg/m². Elevated. Weight control planned discussed Healthy diet and regular exercise. BP: 130/86 Blood pressure is normal. Treatment plan consists of No treatment change needed. Lab Results   Component Value Date    LDLCHOLESTEROL 78 03/04/2020    (goal LDL reduction with dx if diabetes is 50% LDL reduction)      PHQ Scores 2/8/2021 10/28/2020 5/15/2020 2/21/2020 1/2/2020 11/21/2019 4/22/2019   PHQ2 Score 4 6 6 6 0 0 4   PHQ9 Score 16 18 22 21 0 0 19     Interpretation of Total Score Depression Severity: 1-4 = Minimal depression, 5-9 = Mild depression, 10-14 = Moderate depression, 15-19 = Moderately severe depression, 20-27 = Severe depression   This note was completed by using the assistance of a speech-recognition program. However, inadvertent computerized transcription errors may be present.  Although every effort was made to ensure accuracy, no guarantees can be provided that every mistake has been identified and corrected by editing   An electronic signature was used to authenticate this note.   --YARI Martinez - CNP on 2/8/2021 at 10:59 AM

## 2021-02-05 NOTE — PATIENT INSTRUCTIONS
Schedule a Vaccine  If you qualify for the vaccine as a Phase 1B recipient, call the [facility]) COVID-19 Vaccination Hotline to schedule your appointment or to get additional information about the [facility] locations which are offering the COVID-19 vaccine. To be 94% effective, it's important that you receive two doses of one of the COVID-19 vaccines. -If you are receiving the [employer] vaccine, your second shot will be scheduled as close to 21 days after the first shot as possible. -If you are receiving the Moderna vaccine, your second shot will be scheduled as close to 28 days after the first shot as possible. Call [phone]      Links to [facility]) website and [facility] website -     [employer]-monitoring-coronavirus-covid-19/covid-19-vaccine/[place]-vaccine    [email or web address]  Patient Education        phentermine  Pronunciation:  KINA Booth  Brand: Adipex-P, Audrea Juliusds  What is the most important information I should know about phentermine? You should not use this medicine if you have glaucoma, overactive thyroid, severe heart problems, uncontrolled high blood pressure, advanced coronary artery disease, extreme agitation, or a history of drug abuse. Do not use this medicine if you have used an MAO inhibitor in the past 14 days, such as isocarboxazid, linezolid, methylene blue injection, phenelzine, rasagiline, selegiline, or tranylcypromine. What is phentermine? Phentermine is similar to an amphetamine. Phentermine stimulates the central nervous system (nerves and brain), which increases your heart rate and blood pressure and decreases your appetite. Phentermine is used together with diet and exercise to treat obesity, especially in people with risk factors such as high blood pressure, high cholesterol, or diabetes. Phentermine may also be used for purposes not listed in this medication guide.   What should I discuss with my healthcare provider before taking phentermine? You should not use phentermine if you are allergic to it, or if you have:  · a history of heart disease (coronary artery disease, heart rhythm problems, congestive heart failure, stroke);  · severe or uncontrolled high blood pressure;  · overactive thyroid;  · glaucoma;  · extreme agitation or nervousness;  · a history of drug abuse; or  · if you take other diet pills. Do not use phentermine if you have used an MAO inhibitor in the past 14 days. A dangerous drug interaction could occur. MAO inhibitors include isocarboxazid, linezolid, methylene blue injection, phenelzine, rasagiline, selegiline, tranylcypromine, and others. Weight loss during pregnancy can harm an unborn baby, even if you are overweight. Do not use phentermine if you are pregnant. Tell your doctor right away if you become pregnant during treatment. You should not breast-feed while using phentermine. Tell your doctor if you have ever had:  · heart disease or coronary artery disease;  · a heart valve disorder;  · high blood pressure;  · diabetes (your diabetes medication dose may need to be adjusted); or  · kidney disease. Phentermine is not approved for use by anyone younger than 12years old. How should I take phentermine? Follow all directions on your prescription label and read all medication guides or instruction sheets. Your doctor may occasionally change your dose. Use the medicine exactly as directed. Phentermine is usually taken before breakfast, or 1 to 2 hours after breakfast. Follow your doctor's dosing instructions very carefully. Never use phentermine in larger amounts, or for longer than prescribed. Taking more of this medication will not make it more effective and can cause serious, life-threatening side effects. Phentermine is for short-term use only. The effects of appetite suppression may wear off after a few weeks. Phentermine may be habit-forming.  Misuse can cause addiction, overdose, or death. Selling or giving away this medicine is against the law. Call your doctor at once if you think this medicine is not working as well, or if you have not lost at least 4 pounds within 4 weeks. Do not stop using phentermine suddenly, or you could have unpleasant withdrawal symptoms. Ask your doctor how to safely stop using this medicine. Store at room temperature away from moisture and heat. Keep the bottle tightly closed when not in use. What happens if I miss a dose? Take the medicine as soon as you can, but skip the missed dose if it is late in the day. Do not  take two doses at one time. What happens if I overdose? Seek emergency medical attention or call the Poison Help line at 1-545.203.3748. An overdose of phentermine can be fatal.  Overdose symptoms may include confusion, panic, hallucinations, extreme restlessness, nausea, vomiting, diarrhea, stomach cramps, feeling tired or depressed, irregular heartbeats, weak pulse, seizure, or slow breathing (breathing may stop). What should I avoid while taking phentermine? Avoid driving or hazardous activity until you know how this medicine will affect you. Your reactions could be impaired. Drinking alcohol with this medicine can cause side effects. What are the possible side effects of phentermine? Get emergency medical help if you have signs of an allergic reaction: hives; difficult breathing; swelling of your face, lips, tongue, or throat. Call your doctor at once if you have:  · feeling short of breath, even with mild exertion;  · chest pain, feeling like you might pass out;  · swelling in your ankles or feet;  · pounding heartbeats or fluttering in your chest;  · tremors, feeling restless, trouble sleeping;  · unusual changes in mood or behavior; or  · increased blood pressure --severe headache, blurred vision, pounding in your neck or ears, anxiety, nosebleed.   Common side effects may include:  · itching;  · dizziness, headache;  · dry mouth, unpleasant taste;  · diarrhea, constipation, stomach pain; or  · increased or decreased interest in sex. This is not a complete list of side effects and others may occur. Call your doctor for medical advice about side effects. You may report side effects to FDA at 7-533-FDA-9096. What other drugs will affect phentermine? Taking phentermine together with other diet medications such as fenfluramine (Phen-Fen) or dexfenfluramine (Redux) can cause a rare fatal lung disorder called pulmonary hypertension. Do not take phentermine with any other diet medications without your doctor's advice. Many drugs can affect phentermine. This includes prescription and over-the-counter medicines, vitamins, and herbal products. Not all possible interactions are listed here. Tell your doctor about all your current medicines and any medicine you start or stop using. Where can I get more information? Your pharmacist can provide more information about phentermine. Remember, keep this and all other medicines out of the reach of children, never share your medicines with others, and use this medication only for the indication prescribed. Every effort has been made to ensure that the information provided by [facility] [employer] is accurate, up-to-date, and complete, but no guarantee is made to that effect. Drug information contained herein may be time sensitive. [employer] information has been compiled for use by healthcare practitioners and consumers in the [place] and therefore [employer] does not warrant that uses outside of the [place] are appropriate, unless specifically indicated otherwise. [facility] drug information does not endorse drugs, diagnose patients or recommend therapy.  [employer] drug information is an informational resource designed to assist licensed healthcare practitioners in caring for their patients and/or to serve consumers viewing this service as a supplement to, and not a substitute for, the expertise, skill, knowledge and judgment of healthcare practitioners. The absence of a warning for a given drug or drug combination in no way should be construed to indicate that the drug or drug combination is safe, effective or appropriate for any given patient. [facility] does not assume any responsibility for any aspect of healthcare administered with the aid of information [facility] provides. The information contained herein is not intended to cover all possible uses, directions, precautions, warnings, drug interactions, allergic reactions, or adverse effects. If you have questions about the drugs you are taking, check with your doctor, nurse or pharmacist.  Copyright 7928-4841 [address] Version: 10.01. Revision date: 7/26/2018. Care instructions adapted under license by [facility] ([facility]). If you have questions about a medical condition or this instruction, always ask your healthcare professional. [place] [address] any warranty or liability for your use of this information. Patient Education        mirtazapine  Pronunciation:  justino TIMOTHY a peen  Brand:  Remeron, Remeron SolTab  What is the most important information I should know about mirtazapine? Do not use this medicine if you have used an MAO inhibitor in the past 14 days, such as isocarboxazid, linezolid, methylene blue injection, phenelzine, rasagiline, selegiline, or tranylcypromine. Some young people have thoughts about suicide when first taking an antidepressant. Stay alert to changes in your mood or symptoms. Report any new or worsening symptoms to your doctor. What is mirtazapine? Mirtazapine is an antidepressant that is used to treat major depressive disorder in adults. Mirtazapine may also be used for purposes not listed in this medication guide. What should I discuss with my healthcare provider before taking mirtazapine?   You should not take this medicine if you are tablet from the package only when you are ready to take the medicine. Place the tablet on your tongue and allow it to dissolve, without chewing. Swallow several times as the tablet dissolves. It may take a few weeks for your symptoms to improve. Keep using the medication as directed and tell your doctor if your symptoms do not improve after 4 weeks of treatment. Do not stop using mirtazapine suddenly, or you could have unpleasant symptoms (such as dizziness, vomiting, agitation, sweating, confusion, numbness, tingling, or electric shock feelings). Ask your doctor how to safely stop using this medicine. Store at room temperature away from moisture, heat, and light. What happens if I miss a dose? Take the medicine as soon as you can, but skip the missed dose if it is almost time for your next dose. Do not take two doses at one time. What happens if I overdose? Seek emergency medical attention or call the Poison Help line at 1-516.420.3669. Overdose symptoms may include confusion, memory problems, drowsiness, and fast heart rate. What should I avoid while taking mirtazapine? Drinking alcohol with this medicine can cause side effects. Avoid driving or hazardous activity until you know how this medicine will affect you. Your reactions could be impaired. What are the possible side effects of mirtazapine? Get emergency medical help if you have signs of an allergic reaction (hives, joint pain, fever, difficult breathing, swelling in your face or throat) or a severe skin reaction (fever, sore throat, burning eyes, skin pain, red or purple skin rash with blistering and peeling). Report any new or worsening symptoms to your doctor, such as: mood or behavior changes, anxiety, panic attacks, trouble sleeping, or if you feel impulsive, irritable, agitated, hostile, aggressive, restless, hyperactive (mentally or physically), more depressed, or have thoughts about suicide or hurting yourself.   Call your doctor at once if you have:  · racing thoughts, decreased need for sleep, unusual risk-taking behavior, feelings of extreme happiness or sadness, being more talkative than usual;  · blurred vision, tunnel vision, eye pain or swelling, or seeing halos around lights;  · a light-headed feeling, like you might pass out;  · severe rash, blisters, or swelling on the palms of your hands or the soles of your feet;  · a seizure;  · low white blood cell counts --fever, chills, sore throat, cough, sores in your mouth or nose, flu-like symptoms, trouble breathing; or  · low sodium level  --headache, confusion, slurred speech, severe weakness, vomiting, loss of coordination, feeling unsteady. Seek medical attention right away if you have symptoms of serotonin syndrome, such as: agitation, hallucinations, fever, sweating, shivering, fast heart rate, muscle stiffness, twitching, loss of coordination, nausea, vomiting, or diarrhea. Common side effects include:  · drowsiness, dizziness;  · increased appetite; or  · weight gain. This is not a complete list of side effects and others may occur. Call your doctor for medical advice about side effects. You may report side effects to FDA at 9-348-FDA-1952. What other drugs will affect mirtazapine? Using mirtazapine with other drugs that make you drowsy can worsen this effect. Ask your doctor before using opioid medication, a sleeping pill, a muscle relaxer, or medicine for anxiety or seizures. Tell your doctor about all your current medicines.  Many drugs can affect mirtazapine, especially:  · cimetidine;  · diazepam;  · Eveline's wort;  · tramadol;  · tryptophan (sometimes called L-tryptophan);  · an antibiotic --clarithromycin, rifampin, rifampicin, telithromycin;  · antifungal medicine --itraconazole, ketoconazole;  · antiviral medicine to treat HIV/AIDS --indinavir, nelfinavir, ritonavir, saquinavir;  · medicine to treat mood disorders, thought disorders, or mental illness --such as lithium, other antidepressants, or antipsychotics;  · migraine headache medicine --sumatriptan, rizatriptan, Imitrex, Maxzalt, and others; or  · seizure medicine --carbamazepine, phenytoin. This list is not complete and many other drugs may affect mirtazapine. This includes prescription and over-the-counter medicines, vitamins, and herbal products. Not all possible drug interactions are listed here. Where can I get more information? Your pharmacist can provide more information about mirtazapine. Remember, keep this and all other medicines out of the reach of children, never share your medicines with others, and use this medication only for the indication prescribed. Every effort has been made to ensure that the information provided by [address] [place] [address]  is accurate, up-to-date, and complete, but no guarantee is made to that effect. Drug information contained herein may be time sensitive. [facility] information has been compiled for use by healthcare practitioners and consumers in the [place] and therefore [facility] does not warrant that uses outside of the [place] are appropriate, unless specifically indicated otherwise. [facility]'s drug information does not endorse drugs, diagnose patients or recommend therapy. [facility] drug information is an informational resource designed to assist licensed healthcare practitioners in caring for their patients and/or to serve consumers viewing this service as a supplement to, and not a substitute for, the expertise, skill, knowledge and judgment of healthcare practitioners. The absence of a warning for a given drug or drug combination in no way should be construed to indicate that the drug or drug combination is safe, effective or appropriate for any given patient. [facility] does not assume any responsibility for any aspect of healthcare administered with the aid of information [facility] provides.  The information contained herein is not intended to cover all possible uses,

## 2021-02-08 ENCOUNTER — OFFICE VISIT (OUTPATIENT)
Dept: FAMILY MEDICINE CLINIC | Age: 51
End: 2021-02-08
Payer: MEDICARE

## 2021-02-08 VITALS
WEIGHT: 210 LBS | TEMPERATURE: 97.2 F | HEIGHT: 63 IN | HEART RATE: 92 BPM | SYSTOLIC BLOOD PRESSURE: 130 MMHG | OXYGEN SATURATION: 98 % | DIASTOLIC BLOOD PRESSURE: 86 MMHG | BODY MASS INDEX: 37.21 KG/M2

## 2021-02-08 DIAGNOSIS — E66.01 CLASS 2 SEVERE OBESITY WITH SERIOUS COMORBIDITY AND BODY MASS INDEX (BMI) OF 37.0 TO 37.9 IN ADULT, UNSPECIFIED OBESITY TYPE (HCC): ICD-10-CM

## 2021-02-08 DIAGNOSIS — I10 ESSENTIAL HYPERTENSION: Primary | ICD-10-CM

## 2021-02-08 DIAGNOSIS — M54.16 RADICULOPATHY, LUMBAR REGION: ICD-10-CM

## 2021-02-08 DIAGNOSIS — G60.0 CHARCOT MARIE TOOTH MUSCULAR ATROPHY: Chronic | ICD-10-CM

## 2021-02-08 DIAGNOSIS — F33.1 MODERATE EPISODE OF RECURRENT MAJOR DEPRESSIVE DISORDER (HCC): ICD-10-CM

## 2021-02-08 DIAGNOSIS — J31.0 CHRONIC RHINITIS: ICD-10-CM

## 2021-02-08 DIAGNOSIS — Z11.59 SCREENING FOR VIRAL DISEASE: ICD-10-CM

## 2021-02-08 DIAGNOSIS — M17.0 BILATERAL PRIMARY OSTEOARTHRITIS OF KNEE: ICD-10-CM

## 2021-02-08 DIAGNOSIS — F51.04 PSYCHOPHYSIOLOGICAL INSOMNIA: ICD-10-CM

## 2021-02-08 DIAGNOSIS — Z13.31 POSITIVE DEPRESSION SCREENING: ICD-10-CM

## 2021-02-08 PROCEDURE — G8427 DOCREV CUR MEDS BY ELIG CLIN: HCPCS | Performed by: FAMILY MEDICINE

## 2021-02-08 PROCEDURE — G8431 POS CLIN DEPRES SCRN F/U DOC: HCPCS | Performed by: FAMILY MEDICINE

## 2021-02-08 PROCEDURE — 99214 OFFICE O/P EST MOD 30 MIN: CPT | Performed by: FAMILY MEDICINE

## 2021-02-08 PROCEDURE — G8417 CALC BMI ABV UP PARAM F/U: HCPCS | Performed by: FAMILY MEDICINE

## 2021-02-08 PROCEDURE — G8482 FLU IMMUNIZE ORDER/ADMIN: HCPCS | Performed by: FAMILY MEDICINE

## 2021-02-08 PROCEDURE — 1036F TOBACCO NON-USER: CPT | Performed by: FAMILY MEDICINE

## 2021-02-08 RX ORDER — LORATADINE 10 MG/1
10 CAPSULE, LIQUID FILLED ORAL DAILY
Qty: 90 CAPSULE | Refills: 1 | Status: SHIPPED | OUTPATIENT
Start: 2021-02-08

## 2021-02-08 RX ORDER — MIRTAZAPINE 15 MG/1
15 TABLET, FILM COATED ORAL NIGHTLY
Qty: 30 TABLET | Refills: 3 | Status: SHIPPED | OUTPATIENT
Start: 2021-02-08

## 2021-02-08 RX ORDER — FLUTICASONE PROPIONATE 50 MCG
SPRAY, SUSPENSION (ML) NASAL
Qty: 1 BOTTLE | Refills: 2 | Status: SHIPPED | OUTPATIENT
Start: 2021-02-08

## 2021-02-08 ASSESSMENT — PATIENT HEALTH QUESTIONNAIRE - PHQ9
3. TROUBLE FALLING OR STAYING ASLEEP: 3
4. FEELING TIRED OR HAVING LITTLE ENERGY: 3
SUM OF ALL RESPONSES TO PHQ9 QUESTIONS 1 & 2: 4
9. THOUGHTS THAT YOU WOULD BE BETTER OFF DEAD, OR OF HURTING YOURSELF: 0
1. LITTLE INTEREST OR PLEASURE IN DOING THINGS: 3
10. IF YOU CHECKED OFF ANY PROBLEMS, HOW DIFFICULT HAVE THESE PROBLEMS MADE IT FOR YOU TO DO YOUR WORK, TAKE CARE OF THINGS AT HOME, OR GET ALONG WITH OTHER PEOPLE: 2

## 2021-02-08 ASSESSMENT — ENCOUNTER SYMPTOMS
CONSTIPATION: 0
COUGH: 0
DIARRHEA: 0
APNEA: 0
NAUSEA: 0
WHEEZING: 0
SHORTNESS OF BREATH: 0
ABDOMINAL DISTENTION: 0
ABDOMINAL PAIN: 0
VOMITING: 0
CHEST TIGHTNESS: 0
BACK PAIN: 1

## 2021-02-08 ASSESSMENT — COLUMBIA-SUICIDE SEVERITY RATING SCALE - C-SSRS
6. HAVE YOU EVER DONE ANYTHING, STARTED TO DO ANYTHING, OR PREPARED TO DO ANYTHING TO END YOUR LIFE?: NO
1. WITHIN THE PAST MONTH, HAVE YOU WISHED YOU WERE DEAD OR WISHED YOU COULD GO TO SLEEP AND NOT WAKE UP?: NO

## 2021-02-08 ASSESSMENT — ANXIETY QUESTIONNAIRES
2. NOT BEING ABLE TO STOP OR CONTROL WORRYING: 3-NEARLY EVERY DAY
5. BEING SO RESTLESS THAT IT IS HARD TO SIT STILL: 3-NEARLY EVERY DAY
1. FEELING NERVOUS, ANXIOUS, OR ON EDGE: 3-NEARLY EVERY DAY
GAD7 TOTAL SCORE: 18

## 2021-02-08 NOTE — PROGRESS NOTES
Visit Information    Have you changed or started any medications since your last visit including any over-the-counter medicines, vitamins, or herbal medicines? no   Are you having any side effects from any of your medications? -  no  Have you stopped taking any of your medications? Is so, why? -  no    Have you seen any other physician or provider since your last visit? Yes - Records Obtained  Have you had any other diagnostic tests since your last visit? No  Have you been seen in the emergency room and/or had an admission to a hospital since we last saw you? No  Have you had your routine dental cleaning in the past 6 months? no    Have you activated your [employer] account? If not, what are your barriers?  Yes     Patient Care Team:  Jackie Martinez MD as PCP - General (Family Medicine)  Jackie Martinez MD as PCP - [facility]  Nancy Mckenna MD as Surgeon (Orthopedic Surgery)  Weinertjennifer Graham DO as Consulting Physician (Obstetrics & Gynecology)  Sri Bernstein as Consulting Physician (Allergy)  Rajwinder Yu MD as Consulting Physician (Pain Management)  YARI Garsia - CNP as Nurse Practitioner (Neurology)  Jannet Beaulieu MD as Consulting Physician (Physical Medicine and Rehab)    Medical History Review  Past Medical, Family, and Social History reviewed and does contribute to the patient presenting condition    Health Maintenance   Topic Date Due    Hepatitis C screen  1970    Cervical cancer screen  06/21/2017    Annual Wellness Visit (AWV)  05/15/2020    Potassium monitoring  10/28/2021    Creatinine monitoring  10/28/2021    Lipid screen  03/04/2025    DTaP/Tdap/Td vaccine (2 - Td) 12/05/2029    Flu vaccine  Completed    HIV screen  Completed    Hepatitis A vaccine  Aged Out    Hepatitis B vaccine  Aged Out    Hib vaccine  Aged Out    Meningococcal (ACWY) vaccine  Aged Out    Pneumococcal 0-64 years Vaccine  Aged Out

## 2021-02-22 ENCOUNTER — TELEPHONE (OUTPATIENT)
Dept: FAMILY MEDICINE CLINIC | Age: 51
End: 2021-02-22

## 2021-02-23 NOTE — TELEPHONE ENCOUNTER
----- Message from Shweta Maxwell MD sent at 11/2/2020  8:22 AM EST -----  Regarding: medicare report from insurance, had it done this year, I requested 10/28/2020  Patient had Medicare visit at home per her report, done 2020, I requested the report 2028 2020

## 2021-03-04 ASSESSMENT — PATIENT HEALTH QUESTIONNAIRE - PHQ9
SUM OF ALL RESPONSES TO PHQ QUESTIONS 1-9: 21
4. FEELING TIRED OR HAVING LITTLE ENERGY: 3
9. THOUGHTS THAT YOU WOULD BE BETTER OFF DEAD, OR OF HURTING YOURSELF: 0
SUM OF ALL RESPONSES TO PHQ9 QUESTIONS 1 & 2: 6
SUM OF ALL RESPONSES TO PHQ QUESTIONS 1-9: 21
5. POOR APPETITE OR OVEREATING: 0
6. FEELING BAD ABOUT YOURSELF - OR THAT YOU ARE A FAILURE OR HAVE LET YOURSELF OR YOUR FAMILY DOWN: 3
7. TROUBLE CONCENTRATING ON THINGS, SUCH AS READING THE NEWSPAPER OR WATCHING TELEVISION: 3
2. FEELING DOWN, DEPRESSED OR HOPELESS: 3

## 2021-03-04 NOTE — PROGRESS NOTES
Visit Information    Have you changed or started any medications since your last visit including any over-the-counter medicines, vitamins, or herbal medicines? no   Have you stopped taking any of your medications? Is so, why? -  no  Are you having any side effects from any of your medications? - no    Have you seen any other physician or provider since your last visit? yes - PHYSICAL THERAPY   Have you had any other diagnostic tests since your last visit? yes - MRI   Have you been seen in the emergency room and/or had an admission in a hospital since we last saw you?  no   Have you had your routine dental cleaning in the past 6 months?  no     Do you have an active MyChart account? If no, what is the barrier?   Yes    Patient Care Team:  Romie Raza MD as PCP - General (Family Medicine)  [facility]  Jane Felix MD as Surgeon (Orthopedic Surgery)  Douglas Florez DO as Consulting Physician (Obstetrics & Gynecology)  Susan Forrest as Consulting Physician (Allergy)  Chelsea Skelton MD as Consulting Physician (Pain Management)  YARI Arnold CNP as Nurse Practitioner (Neurology)  Ramesh Yang MD as Consulting Physician (Physical Medicine and Rehab)    Medical History Review  Past Medical, Family, and Social History reviewed and does contribute to the patient presenting condition    Health Maintenance   Topic Date Due    Hepatitis C screen  Never done    Cervical cancer screen  06/21/2017    Annual Wellness Visit (AWV)  Never done    Potassium monitoring  10/28/2021    Creatinine monitoring  10/28/2021    Lipid screen  03/04/2025    DTaP/Tdap/Td vaccine (2 - Td) 12/05/2029    Flu vaccine  Completed    HIV screen  Completed    Hepatitis A vaccine  Aged Out    Hepatitis B vaccine  Aged Out    Hib vaccine  Aged Out    Meningococcal (ACWY) vaccine  Aged Out    Pneumococcal 0-64 years Vaccine  Aged Out

## 2021-03-08 ENCOUNTER — TELEMEDICINE (OUTPATIENT)
Dept: FAMILY MEDICINE CLINIC | Age: 51
End: 2021-03-08
Payer: MEDICARE

## 2021-03-08 VITALS — HEIGHT: 63 IN | WEIGHT: 210 LBS | BODY MASS INDEX: 37.21 KG/M2

## 2021-03-08 DIAGNOSIS — Z71.3 ENCOUNTER FOR WEIGHT LOSS COUNSELING: ICD-10-CM

## 2021-03-08 DIAGNOSIS — E66.01 CLASS 2 SEVERE OBESITY DUE TO EXCESS CALORIES WITH SERIOUS COMORBIDITY AND BODY MASS INDEX (BMI) OF 37.0 TO 37.9 IN ADULT (HCC): ICD-10-CM

## 2021-03-08 DIAGNOSIS — M17.0 BILATERAL PRIMARY OSTEOARTHRITIS OF KNEE: Primary | ICD-10-CM

## 2021-03-08 DIAGNOSIS — R63.8 UNABLE TO LOSE WEIGHT: ICD-10-CM

## 2021-03-08 PROCEDURE — 99213 OFFICE O/P EST LOW 20 MIN: CPT | Performed by: FAMILY MEDICINE

## 2021-03-08 PROCEDURE — G8427 DOCREV CUR MEDS BY ELIG CLIN: HCPCS | Performed by: FAMILY MEDICINE

## 2021-03-08 RX ORDER — IBUPROFEN 400 MG/1
400 TABLET ORAL EVERY 6 HOURS PRN
Qty: 120 TABLET | Refills: 0 | Status: SHIPPED | OUTPATIENT
Start: 2021-03-08

## 2021-03-08 RX ORDER — PHENTERMINE HYDROCHLORIDE 15 MG/1
15 CAPSULE ORAL EVERY MORNING
Qty: 30 CAPSULE | Refills: 0 | Status: SHIPPED | OUTPATIENT
Start: 2021-03-08 | End: 2021-04-07

## 2021-03-08 NOTE — PATIENT INSTRUCTIONS
seed oils like canola, soybean, and flaxseed. · Limit unhealthy (saturated) fats, such as butter, palm oil, and coconut oil. And limit fats found in animal products, such as meat and dairy products made with whole milk. Try to eat red meat only a few times a month in very small amounts. · Limit sweets and desserts to only a few times a week. This includes sugar-sweetened drinks like soda. The Mediterranean diet may also include red wine with your meal--1 glass each day for women and up to 2 glasses a day for men. Tips for eating at home  · Use herbs, spices, garlic, lemon zest, and citrus juice instead of salt to add flavor to foods. · Add avocado slices to your sandwich instead of heath. · Have fish for lunch or dinner instead of red meat. Brush the fish with olive oil, and broil or grill it. · Sprinkle your salad with seeds or nuts instead of cheese. · Cook with olive or canola oil instead of butter or oils that are high in saturated fat. · Switch from 2% milk or whole milk to 1% or fat-free milk. · Dip raw vegetables in a vinaigrette dressing or hummus instead of dips made from mayonnaise or sour cream.  · Have a piece of fruit for dessert instead of a piece of cake. Try baked apples, or have some dried fruit. Tips for eating out  · Try broiled, grilled, baked, or poached fish instead of having it fried or breaded. · Ask your  to have your meals prepared with olive oil instead of butter. · Order dishes made with marinara sauce or sauces made from olive oil. Avoid sauces made from cream or mayonnaise. · Choose whole-grain breads, whole wheat pasta and pizza crust, brown rice, beans, and lentils. · Cut back on butter or margarine on bread. Instead, you can dip your bread in a small amount of olive oil. · Ask for a side salad or grilled vegetables instead of french fries or chips. Where can you learn more? Go to [email or web address].[email or web address] org and sign in to your [employer] account.

## 2021-03-08 NOTE — PROGRESS NOTES
Monisha Rodriguez is a 53 y.o. female evaluated via telephone on 3/8/2021. CONSENT:  She and/or health care decision maker is aware that that she may receive a bill for this telephone service, depending on her insurance coverage, and has provided verbal consent to proceed: Yes    DOCUMENTATION:    I communicated with the patient and/or health care decision maker about Weight Gain   . Details of this discussion including any medical advice provided:     BILATERAL KNEE ARTHRITIS  Patient has a known history of bilateral knee arthritis. Patient takes ibuprofen with fair success. However, patient takes ibuprofen only as needed. Patient is aware that patient has to take some food before taking the ibuprofen. Patient describes the pain under both of her knees as mild aches but gets exacerbated with strenuous activities. Which sometimes ends up becoming throbbing especially at the end of the day. Pain is tolerable. Patient denies any recent falls or weakness. Patient has a known history of neuropathy. WANTS TO LOSE WEIGHT/INABILITY TO LOSE WEIGHT    Sushma 's BMI is Body mass index is 37.2 kg/m². kg/m2. Patient's BMI is elevated. Patient's main causes of weight gain are health problems, inactivity, stress and yo-yo dieting. Patient have been trying to lose weight on her own without any success. So far, she  tried low carb. 2- Strong desire present on more than half the days due to reducing carbohydrate consumption, increasing protein, eating regular meals, eliminating pop, reducing caffeine intake, reducing fast food consumption, increasing exercise, reducing sugar and using protein bars or shakes. Discussed low carbohydrate diet and medication prescribed: ADIPEX. This condition increases the patient's risk for chronic conditions. Tianna Siddiqui wants to use an appetite suppressant. We discussed Adipex as a good option. Current weight 210 . Weight goal less than 200lbs.     Patient advised to practice Bilateral primary osteoarthritis of knee  Stable  Continue current therapy. DISCUSSED and ADVISED TO:  Stay at a healthy weight. Continue exercises/PT  Stretch to help prevent stiffness and to prevent injury before exercise. Gentle forms of yoga help keep joints and muscles flexible. Walk instead of jog, ride a bike, swim, and water exercise. Lift weights as tolerated. strong muscles help reduce stress on joints. Take pain medicines exactly as directed and only as needed. - ibuprofen (IBU) 400 MG tablet; Take 1 tablet by mouth every 6 hours as needed for Pain  Dispense: 120 tablet; Refill: 0    2. Unable to lose weight  Worsening  Will start adipex  - phentermine 15 MG capsule; Take 1 capsule by mouth every morning for 30 days. Dispense: 30 capsule; Refill: 0    3. Encounter for weight loss counseling  Worsening  Discussed diet and exercise in detail  Weighed in advantages vs disadvantages on adipex  Unable to exercise well due to pain issues    - phentermine 15 MG capsule; Take 1 capsule by mouth every morning for 30 days. Dispense: 30 capsule; Refill: 0    4. Class 2 severe obesity due to excess calories with serious comorbidity and body mass index (BMI) of 37.0 to 37.9 in adult ([facility] [address]  Worsening  BMI increasing  DISCUSSED AND ADVISED TO:  Eat a low-fat and low carbohydrates diet. Avoid fried foods especially fast food. Choose healthier options for snacks. Have 5-6 servings of fruits and vegetables per day. Cut down on eating processed food. Add 30 minutes to 1 hour aerobic exercise for 3-4 days a week. - phentermine 15 MG capsule; Take 1 capsule by mouth every morning for 30 days. Dispense: 30 capsule; Refill: 0      I affirm this is a Patient Initiated Episode with a Patient who has not had a related appointment within my department in the past 7 days or scheduled within the next 24 hours.     Patient identification was verified at the start of the visit: Yes    Total Time: minutes: 11-20 minutes    Note: not billable if this call serves to triage the patient into an appointment for the relevant concern  Patient-Reported Vitals 3/4/2021   Patient-Reported Weight 210 LB   Patient-Reported Height 5 3     Patient Active Problem List   Diagnosis    Iron deficiency anemia    Charcot Shanel Tooth muscular atrophy    Menstrual abnormality    Endometrial thickening on ultrasound    Cyst of right ovary 4.1 cm    S/P endometrial ablation    Severe episode of recurrent major depressive disorder, with psychotic features ([address]    Gait abnormality    Severe obesity (BMI 35.0-39. 9) with comorbidity (HCC)    Chronic back pain greater than 3 months duration    JOANN (generalized anxiety disorder)    Bilateral foot-drop    Falls frequently    Thoracic disc herniation    Spinal stenosis, thoracic    Numbness and tingling things b/l L/E    Essential hypertension    Intramural leiomyoma of uterus    Radiculopathy, lumbar region    Osteoarthritis of knee    Tension headache    Snoring    Enlarged tonsils    Bilateral primary osteoarthritis of knee    Severe episode of recurrent major depressive disorder, without psychotic features ([address]    Sleep apnea    Moderate episode of recurrent major depressive disorder ([address]     Vladimir Prescott is a 53 y.o. female patient  being evaluated by a Virtual Visit (video visit) encounter to address concerns as mentioned above. A caregiver was present when appropriate. Due to this being a TeleHealth encounter (During [facility] public [facility] emergency), evaluation of the following organ systems was limited:Vitals/Constitutional/EENT/Resp/CV/GI//MS/Neuro/Skin/Heme-Lymph-Imm. Services were provided through a video synchronous discussion virtually to substitute for in-person clinic visit. This is a telehealth visit that was performed with the originating site at Patient Location: home and provider Location of [place], [place].    Pursuant to the emergency declaration under the [address] [place] [address] authority and the [employer] and [employer] Act, this Virtual Visit was conducted with patient's (and/or legal guardian's) consent, to reduce the patient's risk of exposure to COVID-19 and provide necessary medical care. The patient (and/or legal guardian) has also been advised to contact this office for worsening conditions or problems, and seek emergency medical treatment and/or call 911 if deemed necessary. This note was completed by using the assistance of a speech-recognition program. However, inadvertent computerized transcription errors may be present. Although every effort was made to ensure accuracy, no guarantees can be provided that every mistake has been identified and corrected by editing.   Electronically signed by YARI Phillips CNP on 3/8/21 at 7:31 AM EST

## 2021-03-08 NOTE — PROGRESS NOTES
Manish Bowser is a 53 y.o. female evaluated via telephone on 3/8/2021. Consent:  She and/or health care decision maker is aware that that she may receive a bill for this telephone service, depending on her insurance coverage, and has provided verbal consent to proceed: Yes    Documentation:    I communicated with the patient and/or health care decision maker about Weight Gain   . Details of this discussion including any medical advice provided: ***    Review of Systems    [INSTRUCTIONS:  \"[x]\" Indicates a positive item  \"[]\" Indicates a negative item  -- DELETE ALL ITEMS NOT EXAMINED]    Constitutional: [x] Appears well-developed and well-nourished [x] No apparent distress      [] Abnormal -     Mental status: [x] Alert and awake  [x] Oriented to person/place/time [x] Able to follow commands    [] Abnormal -     Eyes:   EOM    [x]  Normal    [] Abnormal -   Sclera  [x]  Normal    [] Abnormal -          Discharge [x]  None visible   [] Abnormal -     HENT: [x] Normocephalic, atraumatic  [] Abnormal -     External Ears [x] Normal  [] Abnormal -    Neck: [x] No visualized mass [] Abnormal -     Pulmonary/Chest: [x] Respiratory effort normal   [x] No visualized signs of difficulty breathing or respiratory distress        [] Abnormal -      Musculoskeletal:  [x] Normal range of motion of neck        [] Abnormal -     Neurological:        [x] No Facial Asymmetry (Cranial nerve 7 motor function) (limited exam due to video visit)          [x] No gaze palsy        [] Abnormal -             Psychiatric:       [x] Normal Affect [] Abnormal -        [x] No Hallucinations    Other pertinent observable physical exam findings:-***    ASSESSMENT  There are no diagnoses linked to this encounter. I affirm this is a Patient Initiated Episode with a Patient who has not had a related appointment within my department in the past 7 days or scheduled within the next 24 hours.     Patient identification was verified at the start of the visit: Yes    Total Time: {minutes:26446::\"5-10 minutes\"}    Note: not billable if this call serves to triage the patient into an appointment for the relevant concern  Patient-Reported Vitals 3/4/2021   Patient-Reported Weight 210 LB   Patient-Reported Height 5 3     Patient Active Problem List   Diagnosis    Iron deficiency anemia    Charcot Shanel Tooth muscular atrophy    Menstrual abnormality    Endometrial thickening on ultrasound    Cyst of right ovary 4.1 cm    S/P endometrial ablation    Severe episode of recurrent major depressive disorder, with psychotic features ([address]    Gait abnormality    Severe obesity (BMI 35.0-39. 9) with comorbidity (HCC)    Chronic back pain greater than 3 months duration    JOANN (generalized anxiety disorder)    Bilateral foot-drop    Falls frequently    Thoracic disc herniation    Spinal stenosis, thoracic    Numbness and tingling things b/l L/E    Essential hypertension    Intramural leiomyoma of uterus    Radiculopathy, lumbar region    Osteoarthritis of knee    Tension headache    Snoring    Enlarged tonsils    Bilateral primary osteoarthritis of knee    Severe episode of recurrent major depressive disorder, without psychotic features ([address]    Sleep apnea    Moderate episode of recurrent major depressive disorder ([address]     Valencia Joya is a 53 y.o. female patient  being evaluated by a Virtual Visit (video visit) encounter to address concerns as mentioned above. ***A caregiver was present when appropriate. Due to this being a TeleHealth encounter (During [facility] public health emergency), evaluation of the following organ systems was limited:Vitals/Constitutional/EENT/Resp/CV/GI//MS/Neuro/Skin/Heme-Lymph-Imm. Services were provided through a video synchronous discussion virtually to substitute for in-person clinic visit.  This is a telehealth visit that was performed with the originating site at Patient Location: home and provider Location of [place], [place]. Pursuant to the emergency declaration under the [facility], [address] [place] [address] authority and the [employer] and [employer] Act, this Virtual Visit was conducted with patient's (and/or legal guardian's) consent, to reduce the patient's risk of exposure to COVID-19 and provide necessary medical care. The patient (and/or legal guardian) has also been advised to contact this office for worsening conditions or problems, and seek emergency medical treatment and/or call 911 if deemed necessary. This note was completed by using the assistance of a speech-recognition program. However, inadvertent computerized transcription errors may be present. Although every effort was made to ensure accuracy, no guarantees can be provided that every mistake has been identified and corrected by editing.   Electronically signed by YARI Padilla CNP on 3/7/21 at 7:21 PM EST

## 2021-03-17 DIAGNOSIS — G60.0 CHARCOT MARIE TOOTH MUSCULAR ATROPHY: Chronic | ICD-10-CM

## 2021-03-17 RX ORDER — GABAPENTIN 800 MG/1
800 TABLET ORAL 3 TIMES DAILY
Qty: 90 TABLET | Refills: 0 | Status: SHIPPED | OUTPATIENT
Start: 2021-03-17 | End: 2021-04-14

## 2021-04-14 DIAGNOSIS — G60.0 CHARCOT MARIE TOOTH MUSCULAR ATROPHY: Chronic | ICD-10-CM

## 2021-04-14 RX ORDER — GABAPENTIN 800 MG/1
800 TABLET ORAL 3 TIMES DAILY
Qty: 90 TABLET | Refills: 0 | Status: SHIPPED | OUTPATIENT
Start: 2021-04-14 | End: 2021-05-17

## 2021-05-15 DIAGNOSIS — G60.0 CHARCOT MARIE TOOTH MUSCULAR ATROPHY: Chronic | ICD-10-CM

## 2021-05-17 RX ORDER — GABAPENTIN 800 MG/1
800 TABLET ORAL 3 TIMES DAILY
Qty: 90 TABLET | Refills: 0 | Status: SHIPPED | OUTPATIENT
Start: 2021-05-17 | End: 2022-10-19

## 2021-08-13 ENCOUNTER — NURSE TRIAGE (OUTPATIENT)
Dept: OTHER | Facility: CLINIC | Age: 51
End: 2021-08-13

## 2021-08-13 NOTE — TELEPHONE ENCOUNTER
Reason for Disposition   MODERATE weakness (i.e., interferes with work, school, normal activities) and cause unknown (Exceptions: weakness with acute minor illness, or weakness from poor fluid intake)    Answer Assessment - Initial Assessment Questions  1. DESCRIPTION: \"Describe how you are feeling. \"      Pt reports feeling exhausted all the time    2. SEVERITY: \"How bad is it? \"  \"Can you stand and walk? \"    - MILD - Feels weak or tired, but does not interfere with work, school or normal activities    - [facility] to stand and walk; weakness interferes with work, school, or normal activities    - SEVERE - Unable to stand or walk      Mild to moderate symptoms - interferes with all activities    3. ONSET:  \"When did the weakness begin? \"      Monica Fanning going on for about a week    4. CAUSE: \"What do you think is causing the weakness? \"      Unknown    5. MEDICINES: [employer] you recently started a new medicine or had a change in the amount of a medicine? \"     No new meds    6. OTHER SYMPTOMS: \"Do you have any other symptoms? \" (e.g., chest pain, fever, cough, SOB, vomiting, diarrhea, bleeding, other areas of pain)     None    7. PREGNANCY: \"Is there any chance you are pregnant? \" \"When was your last menstrual period? \"      Tubes tied    Protocols used: WEAKNESS (GENERALIZED) AND FATIGUE-ADULT-[place]    Received call from Francoise Swan at [facility] with [employer]. Brief description of triage: pt reports significant fatigue for about one week. Also noted a headache that has been occurring. Triage indicates for patient to be seen by Provider or go to [facility]. Care advice provided, patient verbalizes understanding; denies any other questions or concerns; instructed to call back for any new or worsening symptoms. Writer provided warm transfer to Collette Astorga at [facility] for appointment scheduling. Attention Provider: Thank you for allowing me to participate in the care of your patient.   The patient was connected to triage in response to information provided to the E[facility]. Please do not respond through this encounter as the response is not directed to a shared pool.

## 2021-08-14 NOTE — TELEPHONE ENCOUNTER
Needs appointment     Future Appointments   Date Time Provider Dom Trejo   11/9/2021  8:00 AM [facility]

## 2021-09-15 ENCOUNTER — HOSPITAL ENCOUNTER (EMERGENCY)
Age: 51
Discharge: HOME | End: 2021-09-15
Attending: EMERGENCY MEDICINE
Payer: MEDICARE

## 2021-09-15 ENCOUNTER — NURSE TRIAGE (OUTPATIENT)
Dept: OTHER | Facility: CLINIC | Age: 51
End: 2021-09-15

## 2021-09-15 ENCOUNTER — APPOINTMENT (OUTPATIENT)
Dept: CT IMAGING | Age: 51
End: 2021-09-15
Payer: MEDICARE

## 2021-09-15 VITALS
WEIGHT: 200 LBS | BODY MASS INDEX: 35.44 KG/M2 | HEART RATE: 82 BPM | DIASTOLIC BLOOD PRESSURE: 84 MMHG | TEMPERATURE: 98.2 F | OXYGEN SATURATION: 96 % | SYSTOLIC BLOOD PRESSURE: 136 MMHG | HEIGHT: 63 IN | RESPIRATION RATE: 14 BRPM

## 2021-09-15 DIAGNOSIS — R51.9 HEADACHE, UNSPECIFIED HEADACHE TYPE: Primary | ICD-10-CM

## 2021-09-15 LAB
ABSOLUTE EOS #: 0.4 K/UL (ref 0–0.4)
ABSOLUTE IMMATURE GRANULOCYTE: ABNORMAL K/UL (ref 0–0.3)
ABSOLUTE LYMPH #: 1.7 K/UL (ref 1–4.8)
ABSOLUTE MONO #: 0.7 K/UL (ref 0.1–1.3)
ANION GAP SERPL CALCULATED.3IONS-SCNC: 11 MMOL/L (ref 9–17)
BASOPHILS # BLD: 1 % (ref 0–2)
BASOPHILS ABSOLUTE: 0.1 K/UL (ref 0–0.2)
BUN BLDV-MCNC: 12 MG/DL (ref 6–20)
BUN/CREAT BLD: NORMAL (ref 9–20)
CALCIUM SERPL-MCNC: 9.5 MG/DL (ref 8.6–10.4)
CHLORIDE BLD-SCNC: 105 MMOL/L (ref 98–107)
CO2: 26 MMOL/L (ref 20–31)
CREAT SERPL-MCNC: 0.72 MG/DL (ref 0.5–0.9)
DIFFERENTIAL TYPE: ABNORMAL
EOSINOPHILS RELATIVE PERCENT: 5 % (ref 0–4)
GFR AFRICAN AMERICAN: >60 ML/MIN
GFR NON-AFRICAN AMERICAN: >60 ML/MIN
GFR SERPL CREATININE-BSD FRML MDRD: NORMAL ML/MIN/{1.73_M2}
GFR SERPL CREATININE-BSD FRML MDRD: NORMAL ML/MIN/{1.73_M2}
GLUCOSE BLD-MCNC: 96 MG/DL (ref 70–99)
HCG QUALITATIVE: NEGATIVE
HCT VFR BLD CALC: 42.6 % (ref 36–46)
HEMOGLOBIN: 14.1 G/DL (ref 12–16)
IMMATURE GRANULOCYTES: ABNORMAL %
LYMPHOCYTES # BLD: 23 % (ref 24–44)
MAGNESIUM: 2.1 MG/DL (ref 1.6–2.6)
MCH RBC QN AUTO: 28.2 PG (ref 26–34)
MCHC RBC AUTO-ENTMCNC: 33.1 G/DL (ref 31–37)
MCV RBC AUTO: 85.1 FL (ref 80–100)
MONOCYTES # BLD: 9 % (ref 1–7)
NRBC AUTOMATED: ABNORMAL PER 100 WBC
PDW BLD-RTO: 14.2 % (ref 11.5–14.9)
PLATELET # BLD: 237 K/UL (ref 150–450)
PLATELET ESTIMATE: ABNORMAL
PMV BLD AUTO: 7.5 FL (ref 6–12)
POTASSIUM SERPL-SCNC: 4.2 MMOL/L (ref 3.7–5.3)
RBC # BLD: 5.01 M/UL (ref 4–5.2)
RBC # BLD: ABNORMAL 10*6/UL
SARS-COV-2, RAPID: NOT DETECTED
SEG NEUTROPHILS: 62 % (ref 36–66)
SEGMENTED NEUTROPHILS ABSOLUTE COUNT: 4.8 K/UL (ref 1.3–9.1)
SODIUM BLD-SCNC: 142 MMOL/L (ref 135–144)
SPECIMEN DESCRIPTION: NORMAL
WBC # BLD: 7.6 K/UL (ref 3.5–11)
WBC # BLD: ABNORMAL 10*3/UL

## 2021-09-15 PROCEDURE — 83735 ASSAY OF MAGNESIUM: CPT

## 2021-09-15 PROCEDURE — 36415 COLL VENOUS BLD VENIPUNCTURE: CPT

## 2021-09-15 PROCEDURE — 70450 CT HEAD/BRAIN W/O DYE: CPT

## 2021-09-15 PROCEDURE — 6360000002 HC RX W HCPCS: Performed by: EMERGENCY MEDICINE

## 2021-09-15 PROCEDURE — 87635 SARS-COV-2 COVID-19 AMP PRB: CPT

## 2021-09-15 PROCEDURE — 96366 THER/PROPH/DIAG IV INF ADDON: CPT

## 2021-09-15 PROCEDURE — 96365 THER/PROPH/DIAG IV INF INIT: CPT

## 2021-09-15 PROCEDURE — 84703 CHORIONIC GONADOTROPIN ASSAY: CPT

## 2021-09-15 PROCEDURE — 85025 COMPLETE CBC W/AUTO DIFF WBC: CPT

## 2021-09-15 PROCEDURE — 2580000003 HC RX 258: Performed by: EMERGENCY MEDICINE

## 2021-09-15 PROCEDURE — 80048 BASIC METABOLIC PNL TOTAL CA: CPT

## 2021-09-15 PROCEDURE — 96375 TX/PRO/DX INJ NEW DRUG ADDON: CPT

## 2021-09-15 PROCEDURE — 99285 EMERGENCY DEPT VISIT HI MDM: CPT

## 2021-09-15 RX ORDER — PROCHLORPERAZINE EDISYLATE 5 MG/ML
10 INJECTION INTRAMUSCULAR; INTRAVENOUS ONCE
Status: COMPLETED | OUTPATIENT
Start: 2021-09-15 | End: 2021-09-15

## 2021-09-15 RX ORDER — DIPHENHYDRAMINE HYDROCHLORIDE 50 MG/ML
25 INJECTION INTRAMUSCULAR; INTRAVENOUS ONCE
Status: COMPLETED | OUTPATIENT
Start: 2021-09-15 | End: 2021-09-15

## 2021-09-15 RX ORDER — 0.9 % SODIUM CHLORIDE 0.9 %
1000 INTRAVENOUS SOLUTION INTRAVENOUS ONCE
Status: COMPLETED | OUTPATIENT
Start: 2021-09-15 | End: 2021-09-15

## 2021-09-15 RX ORDER — DEXAMETHASONE SODIUM PHOSPHATE 10 MG/ML
10 INJECTION, SOLUTION INTRAMUSCULAR; INTRAVENOUS ONCE
Status: COMPLETED | OUTPATIENT
Start: 2021-09-15 | End: 2021-09-15

## 2021-09-15 RX ADMIN — PROCHLORPERAZINE EDISYLATE 10 MG: 5 INJECTION INTRAMUSCULAR; INTRAVENOUS at 18:10

## 2021-09-15 RX ADMIN — SODIUM CHLORIDE 1000 ML: 9 INJECTION, SOLUTION INTRAVENOUS at 18:09

## 2021-09-15 RX ADMIN — MAGNESIUM SULFATE HEPTAHYDRATE 2000 MG: 500 INJECTION, SOLUTION INTRAMUSCULAR; INTRAVENOUS at 18:25

## 2021-09-15 RX ADMIN — DEXAMETHASONE SODIUM PHOSPHATE 10 MG: 10 INJECTION, SOLUTION INTRAMUSCULAR; INTRAVENOUS at 18:10

## 2021-09-15 RX ADMIN — DIPHENHYDRAMINE HYDROCHLORIDE 25 MG: 50 INJECTION INTRAMUSCULAR; INTRAVENOUS at 18:10

## 2021-09-15 ASSESSMENT — PAIN SCALES - GENERAL
PAINLEVEL_OUTOF10: 3
PAINLEVEL_OUTOF10: 7

## 2021-09-15 ASSESSMENT — PAIN DESCRIPTION - FREQUENCY: FREQUENCY: CONTINUOUS

## 2021-09-15 ASSESSMENT — PAIN DESCRIPTION - PAIN TYPE: TYPE: ACUTE PAIN

## 2021-09-15 ASSESSMENT — ENCOUNTER SYMPTOMS
ABDOMINAL PAIN: 0
BACK PAIN: 0
COLOR CHANGE: 0
CONSTIPATION: 0
VOMITING: 0
DIARRHEA: 0
NAUSEA: 0
COUGH: 0
SHORTNESS OF BREATH: 0
BLOOD IN STOOL: 0
TROUBLE SWALLOWING: 0
SORE THROAT: 0

## 2021-09-15 ASSESSMENT — PAIN DESCRIPTION - DESCRIPTORS: DESCRIPTORS: DISCOMFORT

## 2021-09-15 ASSESSMENT — PAIN DESCRIPTION - LOCATION: LOCATION: HEAD

## 2021-09-15 NOTE — TELEPHONE ENCOUNTER
Patient was sent to emergency room, I was notified about this encounter and I said to go to emergency room for evaluation for possible stroke or complicated migraine headaches

## 2021-09-15 NOTE — ED NOTES
Report given to CATY RN from Sudheer Fried . Report method in person   The following was reviewed with receiving RN:   Current vital signs:  BP (!) 151/95   Pulse 94   Temp 98.2 °F (36.8 °C) (Oral)   Resp 18   Ht 5' 3\" (1.6 m)   Wt 200 lb (90.7 kg)   SpO2 98%   BMI 35.43 kg/m²                MEWS Score: 1     Any medication or safety alerts were reviewed. Any pending diagnostics and notifications were also reviewed, as well as any safety concerns or issues, abnormal labs, abnormal imaging, and abnormal assessment findings. Questions were answered.           Galileo Medina RN  09/15/21 [id number]

## 2021-09-15 NOTE — ED PROVIDER NOTES
[address]  EMERGENCY DEPARTMENT ENCOUNTER    Pt Name: Boris Goode  MRN: [id number]  YOB: 1970  Date of evaluation:9/15/21  PCP: Lake Goel MD    [address] [place] [address]       Chief Complaint   Patient presents with    Headache       HISTORY OF PRESENT ILLNESS    Boris Goode is a 49 y.o. female who presents with chief complaint of a headache. Patient states she had a headache off and on for the past 3 weeks. Initial onset was not thunderclap in nature. She states its been always present however waxing and waning over the past several weeks. Nothing has been making it better or worse. Right now she rates it a 7 out of 10 severity. She states it starts in the back of her head and wraps around to the front. States it feels like a squeezing. No current blurred or double vision however she has had blurry vision over the past several weeks. No numbness, tingling, weakness anywhere. No neck pain or stiffness. Really her only other complaint is feeling very fatigued and tired. She states that she has been homeless and actually living in her car right now. Has not been sleeping with the car running at all. No nausea or vomiting. No diarrhea. No other infectious symptoms. She states she went to urgent care for this and was told to take Motrin and had some blood work done and also tested for Covid which was negative. Symptoms are acute. Symptoms are moderate. Nothing else make symptoms better or worse. Patient has no other complaints at this time. REVIEW OF SYSTEMS       Review of Systems   Constitutional: Positive for fatigue. Negative for chills and fever. HENT: Negative for congestion, ear pain, sore throat and trouble swallowing. Eyes: Positive for visual disturbance. Respiratory: Negative for cough and shortness of breath. Cardiovascular: Negative for chest pain, palpitations and leg swelling.    Gastrointestinal: Negative for abdominal pain, blood in stool, constipation, diarrhea, nausea and vomiting. Genitourinary: Negative for dysuria and flank pain. Musculoskeletal: Negative for arthralgias, back pain, myalgias and neck pain. Skin: Negative for color change, rash and wound. Neurological: Positive for headaches. Negative for dizziness, weakness, light-headedness and numbness. Psychiatric/Behavioral: Negative for confusion. All other systems reviewed and are negative. Negative in 10 essential Systems except as mentioned above and in the HPI. 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 CURRENT MEDICATIONS       Current Discharge Medication List      CONTINUE these medications which have NOT CHANGED    Details   gabapentin (NEURONTIN) 800 MG tablet Take 1 tablet by mouth 3 times daily for 30 days.   Qty: 90 tablet, Refills: 0    Associated Diagnoses: Charcot Shanel Tooth muscular atrophy      ibuprofen (IBU) 400 MG tablet Take 1 tablet by mouth every 6 hours as needed for Pain  Qty: 120 tablet, Refills: 0    Associated Diagnoses: Bilateral primary osteoarthritis of knee      loratadine (CLARITIN) 10 MG capsule Take 1 capsule by mouth daily  Qty: 90 capsule, Refills: 1 Associated Diagnoses: Chronic rhinitis      fluticasone (FLONASE) 50 MCG/ACT nasal spray fluticasone propionate 50 mcg/actuation nasal spray,suspension  Qty: 1 Bottle, Refills: 2    Associated Diagnoses: Chronic rhinitis      mirtazapine (REMERON) 15 MG tablet Take 1 tablet by mouth nightly  Qty: 30 tablet, Refills: 3    Associated Diagnoses: Moderate episode of recurrent major depressive disorder ([facility] [address] Positive depression screening      lisinopril (PRINIVIL;ZESTRIL) 10 MG tablet Take 1 tablet by mouth daily  Qty: 90 tablet, Refills: 3    Associated Diagnoses: Essential hypertension      carvedilol (COREG) 6.25 MG tablet Take 1 tablet by mouth 2 times daily (with meals)  Qty: 180 tablet, Refills: 3    Associated Diagnoses: Essential hypertension      amLODIPine (NORVASC) 10 MG tablet Take 1 tablet by mouth every evening  Qty: 90 tablet, Refills: 3    Associated Diagnoses: Essential hypertension      baclofen (LIORESAL) 10 MG tablet Take 1 tablet by mouth 3 times daily as needed (MUSCLE SPASMS) Causes sedation, do not drive while taking this medication  Qty: 90 tablet, Refills: 0    Associated Diagnoses: Charcot Shanel Tooth muscular atrophy      DULoxetine (CYMBALTA) 60 MG extended release capsule Take 1 capsule by mouth daily Dose increased 5/15/2020  Qty: 90 capsule, Refills: 3    Associated Diagnoses: Moderate episode of recurrent major depressive disorder (HCC)      busPIRone (BUSPAR) 30 MG tablet Take 30 mg by mouth 2 times daily  Qty: 180 tablet, Refills: 3    Associated Diagnoses: Moderate episode of recurrent major depressive disorder (HCC)      acetaminophen (TYLENOL) 325 MG tablet Take 1 tablet by mouth every 6 hours as needed for Pain  Qty: 120 tablet, Refills: 0      Blood Pressure KIT Dx: HTN. Needs automatic blood pressure machine to monitor her blood pressure. Qty: 1 kit, Refills: 0    Associated Diagnoses: Essential hypertension             ALLERGIES     is allergic to penicillins.     FAMILY HISTORY     She indicated that her mother is alive. She indicated that her father is . She indicated that both of her brothers are alive. She indicated that her maternal grandmother is . She indicated that her maternal grandfather is . She indicated that her paternal grandmother is . She indicated that her paternal grandfather is . family history includes Alcohol Abuse in her father and maternal grandfather; Alzheimer's Disease in her paternal grandmother; COPD in her father; Cirrhosis in her father and maternal grandfather; Diabetes in her brother; High Blood Pressure in her mother; Hypertension in her father; Other in her mother and paternal grandfather; Seizures in her brother. SOCIAL HISTORY      reports that she has never smoked. She has never used smokeless tobacco. She reports current alcohol use. She reports that she does not use drugs. PHYSICAL EXAM     INITIAL VITALS:  height is 5' 3\" (1.6 m) and weight is 200 lb (90.7 kg). Her oral temperature is 98.2 °F (36.8 °C). Her blood pressure is 151/95 (abnormal) and her pulse is 94. Her respiration is 18 and oxygen saturation is 98%. Physical Exam  Vitals and nursing note reviewed. Constitutional:       General: She is not in acute distress. HENT:      Head: Normocephalic and atraumatic. Eyes:      Conjunctiva/sclera: Conjunctivae normal.      Pupils: Pupils are equal, round, and reactive to light. Cardiovascular:      Rate and Rhythm: Normal rate and regular rhythm. Heart sounds: Normal heart sounds. No murmur heard. Pulmonary:      Effort: Pulmonary effort is normal. No respiratory distress. Breath sounds: Normal breath sounds. Abdominal:      General: Bowel sounds are normal. There is no distension. Palpations: Abdomen is soft. Tenderness: There is no abdominal tenderness. Musculoskeletal:         General: No tenderness. Cervical back: Neck supple.    Lymphadenopathy: Cervical: No cervical adenopathy. Skin:     General: Skin is warm and dry. Findings: No rash. Neurological:      General: No focal deficit present. Mental Status: She is alert and oriented to person, place, and time. Sensory: No sensory deficit. Motor: No weakness. Coordination: Coordination normal.   Psychiatric:         Judgment: Judgment normal.           DIFFERENTIAL DIAGNOSIS/MDM:   81-year-old female presents with 3 weeks of intermittent waxing and waning headaches. She is afebrile now, nontoxic, normal vital signs other than mild hypertension. No acute distress. She is alert and orient x4 with a GCS 15. Has a grossly normal neurologic exam.    She has no deficits on my exam.    Differential diagnosis is broad. I have a lower suspicion for subarachnoid hemorrhage, venous sinus thrombosis, meningitis, CVA. Her symptoms have been waxing and waning and she does not describe her headache as worst of her life. She is afebrile with relatively normal vital signs otherwise. Other differential with includes complex migraine, tension headache, cluster headache, sinusitis, viral syndrome, metabolic abnormality. We will get head CT here. We will check blood work, give migraine cocktail, reassess. DIAGNOSTIC RESULTS     EKG: All EKG's are interpreted by the Emergency Department Physician who either signs or Co-signs this chart in the absence of a cardiologist.        RADIOLOGY:   I directly visualized the following  images and reviewed the radiologist interpretations:  CT HEAD WO CONTRAST   Final Result   No acute intracranial abnormality.                  ED BEDSIDE ULTRASOUND:      LABS:  Labs Reviewed   CBC WITH AUTO DIFFERENTIAL - Abnormal; Notable for the following components:       Result Value    Lymphocytes 23 (*)     Monocytes 9 (*)     Eosinophils % 5 (*)     All other components within normal limits   COVID-19, RAPID   BASIC METABOLIC PANEL   HCG, SERUM, QUALITATIVE   MAGNESIUM         EMERGENCY DEPARTMENT COURSE:   Vitals:    Vitals:    09/15/21 1618   BP: (!) 151/95   Pulse: 94   Resp: 18   Temp: 98.2 °F (36.8 °C)   TempSrc: Oral   SpO2: 98%   Weight: 200 lb (90.7 kg)   Height: 5' 3\" (1.6 m)     Work-up was unremarkable here. She feels mildly improved after medications. Her head CT is normal.  There is no evidence of subarachnoid hemorrhage, mass, infectious process. Clinically have a low suspicion for subarachnoid hemorrhage even though she is still having headache. I think her symptoms are more likely related to tension, cluster or an atypical migraine headache. She feels very comfortable, was actually sleeping on reexamination. I do not think she needs to be admitted to the hospital.  Advise close follow-up with her PCP, return if any symptoms worsen. She is agreeable plan will be discharged home today. CRITICALCARE:      CONSULTS:  None      PROCEDURES:      FINAL IMPRESSION      1.  Headache, unspecified headache type            DISPOSITION/PLAN   DISPOSITION Decision To Discharge 09/15/2021 09:02:42 PM          PATIENT REFERRED TO:  Fabi Sloan MD  [address] [place] [address]  [facility]  [address] [address]  [phone]    Schedule an appointment as soon as possible for a visit       [facility] ED  [facility] [address]  [address] [place] [id number] [phone]    As needed, If symptoms worsen      DISCHARGE MEDICATIONS:  Current Discharge Medication List          (Please note that portions ofthis note were completed with a voice recognition program.  Efforts were made to edit the dictations but occasionally words are mis-transcribed.)    Emelina Khalil DO  Attending Emergency Physician          Emelina Khalil DO  09/15/21 [id number]

## 2021-09-15 NOTE — ED TRIAGE NOTES
Pt states HA x 3 weeks. Pt states recent work up that came back normal. Pt states still has headache and vision becoming blurry.
negative...

## 2021-09-15 NOTE — TELEPHONE ENCOUNTER
Reason for Disposition   SEVERE headache, states 'worst headache' of life    Answer Assessment - Initial Assessment Questions  1. LOCATION: \"Where does it hurt? \"       Base of neck to eyes, worse with noise/light    2. ONSET: \"When did the headache start? \" (Minutes, hours or days)       One week    3. PATTERN: \"Does the pain come and go, or has it been constant since it started? \"      Fluctuates but constant  Much worse worse in AM    4. SEVERITY: \"How bad is the pain? \" and \"What does it keep you from doing? \"  (e.g., Scale 1-10; mild, moderate, or severe)    - MILD (1-3): doesn't interfere with normal activities     - MODERATE (4-7): interferes with normal activities or awakens from sleep     - SEVERE (8-10): excruciating pain, unable to do any normal activities         10/10 at worst    5. RECURRENT SYMPTOM: \"Have you ever had headaches before? \" If so, ask: \"When was the last time? \" and \"What happened that time? \"       Yes-- a long time ago    6. CAUSE: \"What do you think is causing the headache? \"     Unknown    7. MIGRAINE: \"Have you been diagnosed with migraine headaches? \" If so, ask: \"Is this headache similar? \"       Unsure    8. HEAD INJURY: \"Has there been any recent injury to the head? \"       No    9. OTHER SYMPTOMS: \"Do you have any other symptoms? \" (fever, stiff neck, eye pain, sore throat, cold symptoms)      Blurry vision intermittently, balance completely off when HA is at its worst    10. PREGNANCY: \"Is there any chance you are pregnant? \" \"When was your last menstrual period? \"        no    Protocols used: HEADACHE-ADULT-[place]    Received call from [address] at [facility] with [employer]. Brief description of triage: severe HA 10/10 --base of neck to eyes, assoc with blurred vision and increased loss of balance (has MS and balance issues normally) x 1 week. Triage indicates for patient to go to office with PCP approval or ED/UCC.  Call to [id number] [facility] relayed to Dr. Crow Willard via staff-- pt is to go to ED. Pt agrees. Care advice provided, patient verbalizes understanding; denies any other questions or concerns; instructed to call back for any new or worsening symptoms. Attention Provider: Thank you for allowing me to participate in the care of your patient. The patient was connected to triage in response to information provided to the ECC. Please do not respond through this encounter as the response is not directed to a shared pool.

## 2021-10-18 DIAGNOSIS — F33.1 MODERATE EPISODE OF RECURRENT MAJOR DEPRESSIVE DISORDER (HCC): ICD-10-CM

## 2021-10-18 DIAGNOSIS — I10 ESSENTIAL HYPERTENSION: ICD-10-CM

## 2021-10-18 RX ORDER — DULOXETIN HYDROCHLORIDE 60 MG/1
CAPSULE, DELAYED RELEASE ORAL
Qty: 90 CAPSULE | Refills: 3 | Status: SHIPPED | OUTPATIENT
Start: 2021-10-18

## 2021-10-18 RX ORDER — LISINOPRIL 10 MG/1
TABLET ORAL
Qty: 90 TABLET | Refills: 3 | Status: SHIPPED | OUTPATIENT
Start: 2021-10-18

## 2021-10-18 RX ORDER — BUSPIRONE HYDROCHLORIDE 30 MG/1
TABLET ORAL
Qty: 180 TABLET | Refills: 3 | Status: SHIPPED | OUTPATIENT
Start: 2021-10-18

## 2021-10-18 RX ORDER — CARVEDILOL 6.25 MG/1
TABLET ORAL
Qty: 180 TABLET | Refills: 3 | Status: SHIPPED | OUTPATIENT
Start: 2021-10-18

## 2022-08-04 ENCOUNTER — APPOINTMENT (OUTPATIENT)
Dept: GENERAL RADIOLOGY | Age: 52
End: 2022-08-04
Payer: COMMERCIAL

## 2022-08-04 ENCOUNTER — HOSPITAL ENCOUNTER (EMERGENCY)
Age: 52
Discharge: HOME | End: 2022-08-04
Attending: STUDENT IN AN ORGANIZED HEALTH CARE EDUCATION/TRAINING PROGRAM
Payer: COMMERCIAL

## 2022-08-04 VITALS
HEART RATE: 98 BPM | WEIGHT: 200 LBS | HEIGHT: 63 IN | TEMPERATURE: 98.2 F | BODY MASS INDEX: 35.44 KG/M2 | SYSTOLIC BLOOD PRESSURE: 145 MMHG | RESPIRATION RATE: 16 BRPM | DIASTOLIC BLOOD PRESSURE: 93 MMHG | OXYGEN SATURATION: 99 %

## 2022-08-04 DIAGNOSIS — S69.91XA INJURY OF RIGHT WRIST, INITIAL ENCOUNTER: ICD-10-CM
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
## 2022-08-04 PROCEDURE — 73562 X-RAY EXAM OF KNEE 3: CPT

## 2022-08-04 PROCEDURE — 73590 X-RAY EXAM OF LOWER LEG: CPT

## 2022-08-04 PROCEDURE — 73610 X-RAY EXAM OF ANKLE: CPT

## 2022-08-04 PROCEDURE — 99283 EMERGENCY DEPT VISIT LOW MDM: CPT

## 2022-08-04 PROCEDURE — 6370000000 HC RX 637 (ALT 250 FOR IP): Performed by: STUDENT IN AN ORGANIZED HEALTH CARE EDUCATION/TRAINING PROGRAM

## 2022-08-04 PROCEDURE — 73110 X-RAY EXAM OF WRIST: CPT

## 2022-08-04 RX ORDER — HYDROCODONE BITARTRATE AND ACETAMINOPHEN 5; 325 MG/1; MG/1
1 TABLET ORAL EVERY 4 HOURS PRN
Qty: 10 TABLET | Refills: 0 | Status: SHIPPED | OUTPATIENT
Start: 2022-08-04 | End: 2022-08-07

## 2022-08-04 RX ORDER — HYDROCODONE BITARTRATE AND ACETAMINOPHEN 5; 325 MG/1; MG/1
1 TABLET ORAL ONCE
Status: COMPLETED | OUTPATIENT
Start: 2022-08-04 | End: 2022-08-04

## 2022-08-04 RX ADMIN — HYDROCODONE BITARTRATE AND ACETAMINOPHEN 1 TABLET: 5; 325 TABLET ORAL at 22:29

## 2022-08-04 ASSESSMENT — PAIN SCALES - GENERAL: PAINLEVEL_OUTOF10: 9

## 2022-08-05 ASSESSMENT — ENCOUNTER SYMPTOMS
EYE DISCHARGE: 0
SHORTNESS OF BREATH: 0
ABDOMINAL PAIN: 0
COLOR CHANGE: 0
EYE REDNESS: 0
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
## 2022-10-19 DIAGNOSIS — G60.0 CHARCOT MARIE TOOTH MUSCULAR ATROPHY: Chronic | ICD-10-CM

## 2022-10-19 RX ORDER — GABAPENTIN 800 MG/1
800 TABLET ORAL 3 TIMES DAILY
Qty: 90 TABLET | Refills: 0 | Status: SHIPPED | OUTPATIENT
Start: 2022-10-19 | End: 2022-11-18

## 2023-02-22 DIAGNOSIS — G60.0 CHARCOT MARIE TOOTH MUSCULAR ATROPHY: Chronic | ICD-10-CM

## 2023-02-22 RX ORDER — GABAPENTIN 800 MG/1
800 TABLET ORAL 3 TIMES DAILY
Qty: 90 TABLET | Refills: 0 | OUTPATIENT
Start: 2023-02-22 | End: 2023-03-24

## 2023-05-27 ENCOUNTER — HOSPITAL ENCOUNTER (EMERGENCY)
Facility: CLINIC | Age: 53
Discharge: HOME | End: 2023-05-27
Attending: EMERGENCY MEDICINE
Payer: MEDICARE

## 2023-05-27 VITALS
SYSTOLIC BLOOD PRESSURE: 136 MMHG | DIASTOLIC BLOOD PRESSURE: 88 MMHG | HEIGHT: 63 IN | WEIGHT: 195 LBS | RESPIRATION RATE: 17 BRPM | TEMPERATURE: 98.1 F | HEART RATE: 86 BPM | BODY MASS INDEX: 34.55 KG/M2 | OXYGEN SATURATION: 97 %

## 2023-05-27 DIAGNOSIS — S91.332A PUNCTURE WOUND OF LEFT FOOT, INITIAL ENCOUNTER: Primary | ICD-10-CM

## 2023-05-27 PROCEDURE — 90715 TDAP VACCINE 7 YRS/> IM: CPT | Performed by: EMERGENCY MEDICINE

## 2023-05-27 PROCEDURE — 6360000002 HC RX W HCPCS: Performed by: EMERGENCY MEDICINE

## 2023-05-27 PROCEDURE — 90471 IMMUNIZATION ADMIN: CPT | Performed by: EMERGENCY MEDICINE

## 2023-05-27 PROCEDURE — 99284 EMERGENCY DEPT VISIT MOD MDM: CPT

## 2023-05-27 RX ADMIN — TETANUS TOXOID, REDUCED DIPHTHERIA TOXOID AND ACELLULAR PERTUSSIS VACCINE, ADSORBED 0.5 ML: 5; 2.5; 8; 8; 2.5 SUSPENSION INTRAMUSCULAR at 20:27

## 2023-05-27 ASSESSMENT — LIFESTYLE VARIABLES
HOW MANY STANDARD DRINKS CONTAINING ALCOHOL DO YOU HAVE ON A TYPICAL DAY: PATIENT DOES NOT DRINK
HOW OFTEN DO YOU HAVE A DRINK CONTAINING ALCOHOL: NEVER